# Patient Record
Sex: MALE | Race: WHITE | NOT HISPANIC OR LATINO | Employment: OTHER | URBAN - METROPOLITAN AREA
[De-identification: names, ages, dates, MRNs, and addresses within clinical notes are randomized per-mention and may not be internally consistent; named-entity substitution may affect disease eponyms.]

---

## 2017-01-17 ENCOUNTER — TELEPHONE (OUTPATIENT)
Dept: CARDIOLOGY | Facility: CLINIC | Age: 72
End: 2017-01-17

## 2017-01-17 DIAGNOSIS — E78.2 MIXED HYPERLIPIDEMIA: Primary | ICD-10-CM

## 2017-01-17 DIAGNOSIS — I10 ESSENTIAL HYPERTENSION, BENIGN: Primary | ICD-10-CM

## 2017-01-17 RX ORDER — HYDROCHLOROTHIAZIDE 25 MG/1
25 TABLET ORAL DAILY
Qty: 90 TABLET | Refills: 1 | Status: SHIPPED | OUTPATIENT
Start: 2017-01-17 | End: 2017-01-31

## 2017-01-17 NOTE — TELEPHONE ENCOUNTER
Patient called in to request a change in statins-from atorvastatin 80 MG to Crestor 20 MG due to change insurance which will now be [employer]. He was switched from Crestor to Atorvastatin last May by Dr. Mata due to costs. Patient states that he has been experiencing more myalgias since atorvastatin was increased to 80 MG last September. I told him that I will have Dr. Mata review and then get back to him. [facility]      9/12/16  8:56 AM [id number]        Component Results      Component Value Ref Range & Units Status     Cholesterol 148 <200 mg/dL Final     Triglycerides 146 <150 mg/dL Final     HDL Cholesterol 42 >39 mg/dL Final     LDL Cholesterol Calculated 77 <100 mg/dL Final     Comment:      Desirable:       <100 mg/dl     Non HDL Cholesterol 106 <130 mg/dL Final                 IMPRESSION AND PLAN:  A very delightful 71-year-old gentleman with chronic stable angina with moderate nonobstructive disease involving the RCA on coronary angiogram with other comorbidities of hypertension, with history of intolerance to beta blocker.  Cardiovascular status wise, he is doing well.  His chronic stable angina is CCS class 1-2, stable in nature.  At his request, I am changing Crestor to Lipitor.  We will check a lipid panel in 3 months' time.  He should continue aspirin.  He did not tolerate beta blocker in the past.  If his symptoms get worse, we can try an alternative medication like a calcium channel blocker or Ranexa.  To be noted, he also did not tolerate long-acting nitrates.  At this time, I am planning to see him back in 1 year's time.  We will also do an echocardiogram at that time to follow up on the aortic stenosis.        1.  CAD with chronic stable angina, CCS class 1-2.  Moderate nonobstructive coronary artery disease seen in the right coronary artery system on coronary angiogram.    2.  Hypertension, well controlled.    3.  Mild aortic stenosis.    4.  Suspected restless leg syndrome.        RECOMMENDATIONS:     1.  Change Crestor to Lipitor at patient's request due to cost issues.    2.  Continue aspirin.    3.  Follow up with primary care physician regarding possibility of restless leg syndrome.    4.  Follow up in Cardiology Clinic in 1 year's time with an echocardiogram.

## 2017-01-18 RX ORDER — ROSUVASTATIN CALCIUM 20 MG/1
20 TABLET, COATED ORAL DAILY
Qty: 90 TABLET | Refills: 1 | Status: SHIPPED | OUTPATIENT
Start: 2017-01-18 | End: 2017-03-03

## 2017-01-18 NOTE — TELEPHONE ENCOUNTER
Received refill request for:  Crestor, per phone encounter on 2017  Last OV was: 2016 with Dr. Mata  Labs/EK2016 lipids  F/U scheduled: orders in Epic for 2017, entered order for lipids in 2 months  New script sent to: [facility]

## 2017-01-31 DIAGNOSIS — I10 ESSENTIAL HYPERTENSION, BENIGN: Primary | ICD-10-CM

## 2017-01-31 RX ORDER — HYDROCHLOROTHIAZIDE 25 MG/1
25 TABLET ORAL DAILY
Qty: 90 TABLET | Refills: 0 | Status: SHIPPED | OUTPATIENT
Start: 2017-01-31 | End: 2017-07-31

## 2017-03-02 DIAGNOSIS — E78.2 MIXED HYPERLIPIDEMIA: ICD-10-CM

## 2017-03-02 LAB
CHOLEST SERPL-MCNC: 153 MG/DL
HDLC SERPL-MCNC: 50 MG/DL
LDLC SERPL CALC-MCNC: 76 MG/DL
NONHDLC SERPL-MCNC: 103 MG/DL
TRIGL SERPL-MCNC: 134 MG/DL

## 2017-03-02 PROCEDURE — 36415 COLL VENOUS BLD VENIPUNCTURE: CPT | Performed by: INTERNAL MEDICINE

## 2017-03-02 PROCEDURE — 80061 LIPID PANEL: CPT | Performed by: INTERNAL MEDICINE

## 2017-03-03 ENCOUNTER — DOCUMENTATION ONLY (OUTPATIENT)
Dept: CARDIOLOGY | Facility: CLINIC | Age: 72
End: 2017-03-03

## 2017-03-03 DIAGNOSIS — E78.00 HYPERCHOLESTEREMIA: Primary | ICD-10-CM

## 2017-03-03 DIAGNOSIS — E78.2 MIXED HYPERLIPIDEMIA: ICD-10-CM

## 2017-03-03 RX ORDER — ROSUVASTATIN CALCIUM 20 MG/1
40 TABLET, COATED ORAL DAILY
Qty: 90 TABLET | Refills: 3 | Status: SHIPPED | OUTPATIENT
Start: 2017-03-03 | End: 2017-03-15

## 2017-03-03 NOTE — PROGRESS NOTES
RN called patient with Dr. Mata recommendations below to increase crestor to 40mg daily and have lipid panel with ALT repeated in 2-3 months. RN placed orders for labs and sent new rx to [facility] for 40mg crestor daily. Patient advised to call clinic with any further questions or concerns. Patient acknowledged understanding and agreed with plan.         Patient's last lipid panel was 9/12/16. Per your request, patient had lipid drawn and new results are below. Of note, patient switched from 80mg of atorvastatin to 20mg of Crestor per patient request on 1/17/17(insurance coverage change).         Will send to Dr. Mata to review advise

## 2017-03-03 NOTE — PROGRESS NOTES
Fairly controlled LDL, I recommend  increase crestor to 40 mg qd, this will likely bring LDL below 70. Can repeat lipid panel in 2-3 months with an ALT.    Thanks  Bharat

## 2017-03-15 DIAGNOSIS — E78.2 MIXED HYPERLIPIDEMIA: ICD-10-CM

## 2017-03-15 RX ORDER — ROSUVASTATIN CALCIUM 40 MG/1
40 TABLET, COATED ORAL DAILY
Qty: 90 TABLET | Refills: 3 | Status: SHIPPED | OUTPATIENT
Start: 2017-03-15 | End: 2018-02-06

## 2017-04-11 ENCOUNTER — OFFICE VISIT (OUTPATIENT)
Dept: FAMILY MEDICINE | Facility: CLINIC | Age: 72
End: 2017-04-11

## 2017-04-11 VITALS
HEART RATE: 68 BPM | DIASTOLIC BLOOD PRESSURE: 68 MMHG | RESPIRATION RATE: 20 BRPM | HEIGHT: 71 IN | SYSTOLIC BLOOD PRESSURE: 136 MMHG | BODY MASS INDEX: 27.47 KG/M2 | OXYGEN SATURATION: 98 % | WEIGHT: 196.2 LBS | TEMPERATURE: 97.6 F

## 2017-04-11 DIAGNOSIS — J32.0 CHRONIC MAXILLARY SINUSITIS: ICD-10-CM

## 2017-04-11 DIAGNOSIS — R05.3 CHRONIC COUGH: Primary | ICD-10-CM

## 2017-04-11 PROCEDURE — 99213 OFFICE O/P EST LOW 20 MIN: CPT | Performed by: PHYSICIAN ASSISTANT

## 2017-04-11 RX ORDER — DOXYCYCLINE 100 MG/1
100 CAPSULE ORAL 2 TIMES DAILY
Qty: 20 CAPSULE | Refills: 0 | Status: SHIPPED | OUTPATIENT
Start: 2017-04-11 | End: 2017-05-02

## 2017-04-11 NOTE — MR AVS SNAPSHOT
After Visit Summary   4/11/2017    Nilesh Dos Santos    MRN: [id number]           Patient Information     Date Of Birth          1945        Visit Information        Provider Department      4/11/2017 1:15 PM Ashley Haider PA [facility]        Today's Diagnoses     Chronic cough    -  1    Chronic maxillary sinusitis           Follow-ups after your visit        Your next 10 appointments already scheduled     Apr 28, 2017  9:30 AM CDT   Ech Complete with [id number] ([facility])    [address]  [facility] [id number] [phone]           1.  Please bring or wear a comfortable two-piece outfit. 2.  You may eat, drink and take your normal medicines. 3.  For any questions that cannot be answered, please contact the ordering physician ***Please check-in at the [facility] located in [address] in the [facility] building. When you are finished registering, please go to [address] and have a seat. The technician will call your name for the test.            May 02, 2017  8:45 AM CDT   Return Visit with Bharat Mata MD   [facility] [place] ([facility])    [address]  [facility] [id number] [phone]            May 09, 2017  9:30 AM CDT   Physical-Complete with Nilesh Guzman MD   [facility] ([facility])    [address]  [address]  [facility] [address] [phone]           Instruct patient that they should have nothing(except water) after midnight the evening prior to the appointment.              Who to contact     If you have questions or need follow up information about today's clinic visit or your schedule please contact [facility] directly at [phone].  Normal or non-critical lab and imaging results  "will be communicated to you by iFithart, letter or phone within 4 business days after the clinic has received the results. If you do not hear from us within 7 days, please contact the clinic through Dnevnik or phone. If you have a critical or abnormal lab result, we will notify you by phone as soon as possible.  Submit refill requests through Dnevnik or call your pharmacy and they will forward the refill request to us. Please allow 3 business days for your refill to be completed.          Additional Information About Your Visit        Dnevnik Information     Dnevnik gives you secure access to your electronic health record. If you see a primary care provider, you can also send messages to your care team and make appointments. If you have questions, please call your primary care clinic.  If you do not have a primary care provider, please call [phone] and they will assist you.        Care EveryWhere ID     This is your Care EveryWhere ID. This could be used by other organizations to access your [facility] medical records  [id number]        Your Vitals Were     Pulse Temperature Respirations Height Pulse Oximetry BMI (Body Mass Index)    68 97.6  F (36.4  C) (Oral) 20 1.791 m (5' 10.5\") 98% 27.75 kg/m2       Blood Pressure from Last 3 Encounters:   04/11/17 136/68   05/27/16 124/76   11/24/15 118/64    Weight from Last 3 Encounters:   04/11/17 89 kg (196 lb 3.2 oz)   05/27/16 90.4 kg (199 lb 3.2 oz)   11/24/15 87.1 kg (192 lb)              Today, you had the following     No orders found for display         Today's Medication Changes          These changes are accurate as of: 4/11/17 11:59 PM.  If you have any questions, ask your nurse or doctor.               Start taking these medicines.        Dose/Directions    doxycycline 100 MG capsule   Commonly known as:  VIBRAMYCIN   Used for:  Chronic cough, Chronic maxillary sinusitis   Started by:  Ashley Haider PA        Dose:  100 mg   Take 1 capsule (100 " mg) by mouth 2 times daily   Quantity:  20 capsule   Refills:  0            Where to get your medicines      These medications were sent to [facility] - [place] [id number]  [address] [address]     Phone:  [phone]     doxycycline 100 MG capsule                Primary Care Provider Office Phone # Fax #    Nilesh Guzman [phone]       [facility] [address] [facility] [address]  [facility] [address]        Thank you!     Thank you for choosing [facility]  for your care. Our goal is always to provide you with excellent care. Hearing back from our patients is one way we can continue to improve our services. Please take a few minutes to complete the written survey that you may receive in the mail after your visit with us. Thank you!             Your Updated Medication List - Protect others around you: Learn how to safely use, store and throw away your medicines at www.disposemymeds.org.          This list is accurate as of: 4/11/17 11:59 PM.  Always use your most recent med list.                   Brand Name Dispense Instructions for use    ALEVE 220 MG tablet   Generic drug:  naproxen sodium      Take 220 mg by mouth as needed for moderate pain       aspirin 81 MG tablet      Take by mouth daily       coenzyme Q-10 capsule     90 capsule    Take 1 capsule by mouth daily       COMPRESSION STOCKINGS     2 each    1 each daily       doxycycline 100 MG capsule    VIBRAMYCIN    20 capsule    Take 1 capsule (100 mg) by mouth 2 times daily       hydrochlorothiazide 25 MG tablet    HYDRODIURIL    90 tablet    Take 1 tablet (25 mg) by mouth daily       NITROSTAT 0.4 MG sublingual tablet   Generic drug:  nitroglycerin      Place under the tongue every 5 minutes as needed       rosuvastatin 40 MG tablet    CRESTOR    90 tablet    Take 1 tablet (40 mg) by mouth daily

## 2017-04-11 NOTE — NURSING NOTE
Nilesh Dos Santos is here for a cough for about 2 months. Sates that he get this every year, but this it the longest this has lasted.  Questioned patient about current smoking habits.  Pt. quit smoking some time ago.  Body mass index is 25.89 kg/(m^2).  PULSE regular  My Chart: active  CLASSIFICATION OF OVERWEIGHT AND OBESITY BY BMI                        Obesity Class           BMI(kg/m2)  Underweight                                    < 18.5  Normal                                         18.5-24.9  Overweight                                     25.0-29.9  OBESITY                     I                  30.0-34.9                             II                 35.0-39.9  EXTREME OBESITY             III                >40                            Patient's  BMI Body mass index is 27.75 kg/(m^2).  http://hin.nhlbi.nih.gov/menuplanner/menu.cgi    Pre-visit planning  Immunizations - up to date  Colonoscopy - is up to date  Mammogram -   Asthma -   PHQ9 -    BARTOLO-7 -

## 2017-04-11 NOTE — PROGRESS NOTES
SUBJECTIVE:                                                    Nilesh Dos Santos is a 72 year old male who presents to clinic today for the following health issues:    Nilesh  is here today because of: Cough    Patient states he has a chronic (since college) night-time plugging of maxillary sinuses - When he rolls to one side - fluid shift to the other side. He had a nasal surgery at [age] due to fracture. Sx started after that.    He states about once a year for the past few years he has had a cough in the winter that lasts about a month.  Sx usually in Jan/Feb.    This time his cough has continued, 2 months, occ. Productive.  Has started Benadryl 25 mg tid which helps.  Has occ, SOB, In evening SOB is worse, no fevers.  No hx of asthma.     Patient is not a smoker    Other x:   Pt keeps biting right cheek - seeing dentist  Ears feel plugged - 2 weeks.  Runny nose in the am  No facial pain        BP Readings from Last 3 Encounters:   04/11/17 136/68   05/27/16 124/76   11/24/15 118/64    Wt Readings from Last 3 Encounters:   04/11/17 89 kg (196 lb 3.2 oz)   05/27/16 90.4 kg (199 lb 3.2 oz)   11/24/15 87.1 kg (192 lb)                  Patient Active Problem List   Diagnosis     Calculus of kidney     Allergic rhinitis     Mixed hyperlipidemia     Family history of other cardiovascular diseases     History of colonic polyps     Contact dermatitis and other eczema, due to unspecified cause     Essential hypertension, benign     Health Care Home     Advance care planning     DJD (degenerative joint disease) of knee     Abnormal glucose     Sprain of thoracic region     Cardiovascular disease     Chest pain     SOB (shortness of breath)     HTN (hypertension)     Varicose veins     CAD (coronary artery disease)     Past Surgical History:   Procedure Laterality Date     ANGIOGRAM  11/17/2015    Med Tx, no flow limiting lesions     C URETER ENDOSCOPY THRU URETEROSTOMY REMV FB OR CALCULUS  2001    dr de la garza     EYE EXAM  "ESTABLISHED PT  2009     HC COLONOSCOPY THRU STOMA, DIAGNOSTIC       HC EXCISE VARICOCELE      \"cautery\" through intra venous approach     HC KNEE SCOPE, DIAGNOSTIC      Arthroscopy, Knee/left knee      HC REPAIR ING HERNIA,5+Y/O,REDUCIBL      Inguinal Hernia Repair  Right     HC VASECTOMY UNILAT/BILAT W POSTOP SEMEN      Vasectomy     TEST NOT FOUND      Per cut removal of L kidney stone       Social History   Substance Use Topics     Smoking status: Former Smoker     Quit date: 1979     Smokeless tobacco: Never Used      Comment: quit a while ago     Alcohol use 2.0 oz/week     4 Glasses of wine per week      Comment: 1-2 glass of wine daily     Family History   Problem Relation Age of Onset     Alzheimer Disease No family hx of      CANCER No family hx of      HEART DISEASE Mother      91     HEART DISEASE Father       70s     DIABETES Maternal Aunt      Cancer - colorectal Mother      Prostate Cancer No family hx of          Current Outpatient Prescriptions   Medication Sig Dispense Refill     rosuvastatin (CRESTOR) 40 MG tablet Take 1 tablet (40 mg) by mouth daily 90 tablet 3     hydrochlorothiazide (HYDRODIURIL) 25 MG tablet Take 1 tablet (25 mg) by mouth daily 90 tablet 0     COMPRESSION STOCKINGS 1 each daily 2 each 4     naproxen sodium (ALEVE) 220 MG tablet Take 220 mg by mouth as needed for moderate pain       coenzyme Q-10 capsule Take 1 capsule by mouth daily 90 capsule 3     aspirin 81 MG tablet Take by mouth daily       nitroglycerin (NITROSTAT) 0.4 MG SL tablet Place under the tongue every 5 minutes as needed       Allergies   Allergen Reactions     Amoxicillin      severe rash     Contrast Dye Hives     Patient states this was years ago and he believes he has had dye since that time without problems.       OBJECTIVE:                                                    /68 (BP Location: Left arm, Patient Position: Chair, Cuff Size: Adult Large)  Pulse 68  Temp 97.6  F " "(36.4  C) (Oral)  Resp 20  Ht 1.791 m (5' 10.5\")  Wt 89 kg (196 lb 3.2 oz)  SpO2 98%  BMI 27.75 kg/m2   Body mass index is 27.75 kg/(m^2).     GENERAL: healthy, alert and no distress  EYES:Lids and Conjunctival normal, no discharge present bilaterally  EARS:   Right: CANAL and TM is normal: no effusions, no erythema, and normal landmarks  Left: CANAL and TM is normal: no effusions, no erythema, and normal landmarks  NOSE: normal  OROPHARYNX: normal, no tonsillar hypertrophy and no exudates present  NECK: normal, supple and no adenopathy  HEART: regular rate and rhythm; no murmurs, clicks, or gallops  LUNGS: CTA bilaterally  SKIN:Warm, Dry and No Rash           ASSESSMENT/PLAN:                                                          P:       ICD-10-CM    1. Chronic cough R05 doxycycline (VIBRAMYCIN) 100 MG capsule   2. Chronic maxillary sinusitis J32.0 doxycycline (VIBRAMYCIN) 100 MG capsule       Trial of doxycycline for suspected chronic Sinusitis.  Pt to call next week with update.  Advised CT of sinuses if sinus sx not improving.    Pt to start OTC Allegra D in a few days.    If sx not improving next week also add Singulair      If an antibiotic was prescribed, AE were discussed including GI upset. Advised either yogurt or probiotic at lunchtime daily.     Pt will call next week with update.  Advised Chest xray in 2-3 weeks if not improving, sooner if worsening.       Ashley Haider PA-C  4/11/2017          "

## 2017-04-28 ENCOUNTER — HOSPITAL ENCOUNTER (OUTPATIENT)
Dept: CARDIOLOGY | Facility: CLINIC | Age: 72
Discharge: HOME | End: 2017-04-28
Attending: INTERNAL MEDICINE | Admitting: INTERNAL MEDICINE
Payer: MEDICARE

## 2017-04-28 DIAGNOSIS — I35.9 AORTIC VALVE DISORDER: ICD-10-CM

## 2017-04-28 DIAGNOSIS — I25.10 CORONARY ARTERY DISEASE INVOLVING NATIVE HEART, ANGINA PRESENCE UNSPECIFIED, UNSPECIFIED VESSEL OR LESION TYPE: ICD-10-CM

## 2017-04-28 PROCEDURE — 93306 TTE W/DOPPLER COMPLETE: CPT

## 2017-04-28 PROCEDURE — 93306 TTE W/DOPPLER COMPLETE: CPT | Mod: 26 | Performed by: INTERNAL MEDICINE

## 2017-05-02 ENCOUNTER — OFFICE VISIT (OUTPATIENT)
Dept: CARDIOLOGY | Facility: CLINIC | Age: 72
End: 2017-05-02
Attending: INTERNAL MEDICINE
Payer: COMMERCIAL

## 2017-05-02 VITALS
SYSTOLIC BLOOD PRESSURE: 118 MMHG | HEIGHT: 71 IN | OXYGEN SATURATION: 99 % | WEIGHT: 196 LBS | HEART RATE: 69 BPM | BODY MASS INDEX: 27.44 KG/M2 | DIASTOLIC BLOOD PRESSURE: 78 MMHG

## 2017-05-02 DIAGNOSIS — I25.10 CORONARY ARTERY DISEASE INVOLVING NATIVE HEART, ANGINA PRESENCE UNSPECIFIED, UNSPECIFIED VESSEL OR LESION TYPE: ICD-10-CM

## 2017-05-02 DIAGNOSIS — I35.9 AORTIC VALVE DISORDER: ICD-10-CM

## 2017-05-02 PROCEDURE — 99213 OFFICE O/P EST LOW 20 MIN: CPT | Performed by: INTERNAL MEDICINE

## 2017-05-02 NOTE — PROGRESS NOTES
HPI and Plan:   See dictation(#[id number])    Orders Placed This Encounter   Procedures     Lipid Profile     ALT     Follow-Up with Cardiologist       No orders of the defined types were placed in this encounter.      Medications Discontinued During This Encounter   Medication Reason     doxycycline (VIBRAMYCIN) 100 MG capsule Therapy completed         Encounter Diagnoses   Name Primary?     Coronary artery disease involving native heart, angina presence unspecified, unspecified vessel or lesion type      Aortic valve disorder        CURRENT MEDICATIONS:  Current Outpatient Prescriptions   Medication Sig Dispense Refill     rosuvastatin (CRESTOR) 40 MG tablet Take 1 tablet (40 mg) by mouth daily 90 tablet 3     hydrochlorothiazide (HYDRODIURIL) 25 MG tablet Take 1 tablet (25 mg) by mouth daily 90 tablet 0     COMPRESSION STOCKINGS 1 each daily 2 each 4     naproxen sodium (ALEVE) 220 MG tablet Take 220 mg by mouth as needed for moderate pain       coenzyme Q-10 capsule Take 1 capsule by mouth daily 90 capsule 3     aspirin 81 MG tablet Take by mouth daily       nitroglycerin (NITROSTAT) 0.4 MG SL tablet Place under the tongue every 5 minutes as needed         ALLERGIES     Allergies   Allergen Reactions     Amoxicillin      severe rash     Contrast Dye Hives     Patient states this was years ago and he believes he has had dye since that time without problems.       PAST MEDICAL HISTORY:  Past Medical History:   Diagnosis Date     CAD (coronary artery disease)     diffuse CAD      Chest pain     chronic stable     Chronic stable angina (H)      Contact dermatitis and other eczema, due to unspecified cause      DEPRESSIVE DISORDER NEC      Dyslipidemia      Family history of other cardiovascular diseases     Mother and Father     HTN (hypertension)      IRRITABLE COLON      Mild aortic stenosis      Personal history of urinary calculi      Skin cancer, basal cell 2008     SOB (shortness of breath)      Varicose veins   "      PAST SURGICAL HISTORY:  Past Surgical History:   Procedure Laterality Date     ANGIOGRAM  2015    Med Tx, no flow limiting lesions     C URETER ENDOSCOPY THRU URETEROSTOMY REMV FB OR CALCULUS      dr de la garza     EYE EXAM ESTABLISHED PT       HC COLONOSCOPY THRU STOMA, DIAGNOSTIC       HC EXCISE VARICOCELE      \"cautery\" through intra venous approach     HC KNEE SCOPE, DIAGNOSTIC      Arthroscopy, Knee/left knee      HC REPAIR ING HERNIA,5+Y/O,REDUCIBL      Inguinal Hernia Repair  Right     HC VASECTOMY UNILAT/BILAT W POSTOP SEMEN      Vasectomy     TEST NOT FOUND      Per cut removal of L kidney stone       FAMILY HISTORY:  Family History   Problem Relation Age of Onset     HEART DISEASE Mother      91     Cancer - colorectal Mother      HEART DISEASE Father       70s     DIABETES Maternal Aunt      Alzheimer Disease No family hx of      CANCER No family hx of      Prostate Cancer No family hx of        SOCIAL HISTORY:  Social History     Social History     Marital status:      Spouse name: Velia     Number of children: 2     Years of education: 18     Occupational History     Consultant Self     Social History Main Topics     Smoking status: Former Smoker     Types: Cigars     Quit date: 1975     Smokeless tobacco: Never Used      Comment: 2-3 cigars     Alcohol use 2.0 oz/week     4 Glasses of wine per week      Comment: 1-2 glass of wine daily     Drug use: No     Sexual activity: Yes     Partners: Female     Birth control/ protection: Surgical      Comment: vas     Other Topics Concern      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, , [employer]      Blood Transfusions No     Caffeine Concern Yes     2 cups per day     Occupational Exposure No     Hobby Hazards No     Sleep Concern No     Stress Concern No     Weight Concern No     Special Diet No     Exercise No     Seat Belt Yes     Self-Exams No     Social History Narrative       Review of Systems:  Skin:  " "Negative       Eyes:  Positive for glasses dry & scratchy   ENT:  Positive for sinus trouble;postnasal drainage;nasal congestion    Respiratory:  Positive for cough;wheezing     Cardiovascular:    fatigue;Positive for    Gastroenterology: Negative      Genitourinary:  Negative      Musculoskeletal:  Positive for arthritis    Neurologic:  Positive for numbness or tingling of feet numbness in toes   Psychiatric:  Positive for depression    Heme/Lymph/Imm:  Positive for easy bruising    Endocrine:  Negative        Physical Exam:  Vitals: /78 (BP Location: Right arm, Patient Position: Chair, Cuff Size: Adult Regular)  Pulse 69  Ht 1.791 m (5' 10.5\")  Wt 88.9 kg (196 lb)  SpO2 99%  BMI 27.73 kg/m2    Constitutional:           Skin:           Head:           Eyes:           ENT:           Neck:           Chest:             Cardiac:                    Abdomen:           Vascular:                                          Extremities and Back:                 Neurological:                 ABUNDIO Mata MD   [facility]  [address]  [place] [address]                  "

## 2017-05-02 NOTE — MR AVS SNAPSHOT
After Visit Summary   5/2/2017    Nilesh Dos Santos    MRN: [id number]           Patient Information     Date Of Birth          1945        Visit Information        Provider Department      5/2/2017 8:45 AM Bharat Mata MD [facility]        Today's Diagnoses     Coronary artery disease involving native heart, angina presence unspecified, unspecified vessel or lesion type        Aortic valve disorder           Follow-ups after your visit        Additional Services     Follow-Up with Cardiologist                 Your next 10 appointments already scheduled     May 09, 2017  9:30 AM CDT   Physical-Complete with Nilesh Guzman MD   [facility] ([facility])    [address]  [address]  [facility] [address] [phone]           Instruct patient that they should have nothing(except water) after midnight the evening prior to the appointment.              Future tests that were ordered for you today     Open Future Orders        Priority Expected Expires Ordered    Lipid Profile Routine 5/2/2018 6/6/2018 5/2/2017    ALT Routine 5/2/2018 6/6/2018 5/2/2017    Follow-Up with Cardiologist Routine 5/2/2018 9/14/2018 5/2/2017            Who to contact     If you have questions or need follow up information about today's clinic visit or your schedule please contact [facility] directly at [phone].  Normal or non-critical lab and imaging results will be communicated to you by MyChart, letter or phone within 4 business days after the clinic has received the results. If you do not hear from us within 7 days, please contact the clinic through MyChart or phone. If you have a critical or abnormal lab result, we will notify you by phone as soon as possible.  Submit refill requests through [employer] or call your pharmacy and they will forward the refill request to us.  "Please allow 3 business days for your refill to be completed.          Additional Information About Your Visit        MyChart Information     [employer] gives you secure access to your electronic health record. If you see a primary care provider, you can also send messages to your care team and make appointments. If you have questions, please call your primary care clinic.  If you do not have a primary care provider, please call [phone] and they will assist you.        Care EveryWhere ID     This is your Care EveryWhere ID. This could be used by other organizations to access your [place] medical records  [id number]        Your Vitals Were     Pulse Height Pulse Oximetry BMI (Body Mass Index)          69 1.791 m (5' 10.5\") 99% 27.73 kg/m2         Blood Pressure from Last 3 Encounters:   05/02/17 118/78   04/11/17 136/68   05/27/16 124/76    Weight from Last 3 Encounters:   05/02/17 88.9 kg (196 lb)   04/11/17 89 kg (196 lb 3.2 oz)   05/27/16 90.4 kg (199 lb 3.2 oz)              We Performed the Following     Follow-Up with Cardiologist        Primary Care Provider Office Phone # Fax #    Nilesh Guzman [phone]       [facility] [address]  [facility] [address]        Thank you!     Thank you for choosing [facility] [place]  for your care. Our goal is always to provide you with excellent care. Hearing back from our patients is one way we can continue to improve our services. Please take a few minutes to complete the written survey that you may receive in the mail after your visit with us. Thank you!             Your Updated Medication List - Protect others around you: Learn how to safely use, store and throw away your medicines at www.disposemymeds.org.          This list is accurate as of: 5/2/17  9:21 AM.  Always use your most recent med list.                   Brand Name Dispense Instructions for use    ALEVE 220 " MG tablet   Generic drug:  naproxen sodium      Take 220 mg by mouth as needed for moderate pain       aspirin 81 MG tablet      Take by mouth daily       coenzyme Q-10 capsule     90 capsule    Take 1 capsule by mouth daily       COMPRESSION STOCKINGS     2 each    1 each daily       hydrochlorothiazide 25 MG tablet    HYDRODIURIL    90 tablet    Take 1 tablet (25 mg) by mouth daily       NITROSTAT 0.4 MG sublingual tablet   Generic drug:  nitroglycerin      Place under the tongue every 5 minutes as needed       rosuvastatin 40 MG tablet    CRESTOR    90 tablet    Take 1 tablet (40 mg) by mouth daily

## 2017-05-02 NOTE — PROGRESS NOTES
REASON FOR CLINIC VISIT:  Follow up CAD.      HISTORY OF PRESENT ILLNESS:  Mr. Dos Santos is a very pleasant 72-year-old gentleman with history of chronic stable angina, CAD with coronary CT angiogram in 2013 showing diffuse coronary artery disease, but no significant obstructive disease and due to exertional chest discomfort he eventually underwent coronary angiogram in 2015 where he was found to have moderate stenosis involving the right posterior descending and posterolateral branch with FFR of 0.9 and 0.94, respectively, indicating they were not flow-limiting lesions.  Today, the patient is coming for routine followup.  He has been on Imdur and beta blocker in the past, but they were discontinued due to side effects.  The patient tells me that cardiac health wise he feels quite well.  He did shovel snow this season and has no chest discomfort with physical activity.  He is struggling with fatigue which he blames on poor sleep.  Due to insurance issues, Crestor was changed to Lipitor, but Lipitor caused him side effects and he was switched back to Crestor.  Lipid panel done in March shows LDL of 76.  In the past, his LDL has been as low as 52.  This prompted increasing Crestor from 20 to 40 mg daily and he is due for repeat lipid panel check in a few weeks' time.  He also had an echocardiogram done that shows normal LVEF of 60-65%.  There was mild atherosclerotic plaque seen in the ascending aorta, borderline to mildly dilated ascending aorta measuring 3.8 cm.      PHYSICAL EXAMINATION:   VITAL SIGNS:  Blood pressure 118/78, heart rate 69 and regular.  Weight 196 pounds, BMI 27.78.   GENERAL:  The patient appears pleasant, comfortable.   NECK:  Normal JVP, no bruit.   CARDIOVASCULAR SYSTEM:  There is systolic ejection click heard over the right upper sternal border.  No murmur, rub or gallop.   RESPIRATORY SYSTEM:  Clear to auscultation bilaterally.   ABDOMEN:  Soft, nontender.   EXTREMITIES:  No pitting pedal edema.    NEUROLOGICAL:  Alert, oriented x3.   PSYCHIATRIC:  Normal affect.   SKIN:  No obvious rash.   HEENT:  No pallor or icterus.   VASCULAR:  2+ dorsalis pedis and posterior tibialis bilaterally, lower extremities.      IMPRESSION AND PLAN:  A very delightful 72-year-old gentleman with history of chronic stable angina with coronary angiogram in  showing moderate stenosis involving the RPDA and RPLA with normal LV function.  Overall, cardiovascular status-wise he is doing well.  He no longer is feeling chest discomfort with exertion.  Blood pressure is well controlled.  He is on hydrochlorothiazide.  He is additionally on baby aspirin and Crestor 40 mg daily.  He is due for repeat lipid panel.  Regarding borderline dilated aorta we can repeat echocardiogram in 2 years time or so.   If he continues to feel well cardiac wise, we can see him back in 1 year's time. In the interim, if he notices any exertional increase in symptoms of chest discomfort, he should call our clinic and we will see him sooner.         SHAUNA BLOCK MD             D: 2017 09:30   T: 2017 12:24   MT: SONDRA      Name:     MIRA MACHADO   MRN:      [id number]        Account:      [id number]   :      1945           Service Date: 2017      Document: [id number]

## 2017-05-02 NOTE — LETTER
5/2/2017    Nilesh Guzman MD  [facility]   [address] [facility] [address]  [facility] [address]    RE: Nilesh GABRIELE Dos Santos       Dear Colleague,    I had the pleasure of seeing Nilesh Dos Santos in the [facility].    REASON FOR CLINIC VISIT:  Follow up CAD.      Mr. Dos Santos is a very pleasant 72-year-old gentleman with history of chronic stable angina, CAD with coronary CT angiogram in 2013 showing diffuse coronary artery disease, but no significant obstructive disease and due to exertional chest discomfort he eventually underwent coronary angiogram in 2015 where he was found to have moderate stenosis involving the right posterior descending and posterolateral branch with FFR of 0.9 and 0.94, respectively, indicating they were not flow-limiting lesions.  Today, the patient is coming for routine followup.  He has been on Imdur and beta blocker in the past, but they were discontinued due to side effects.  The patient tells me that cardiac health wise he feels quite well.  He did shovel snow this season and has no chest discomfort with physical activity.  He is struggling with fatigue which he blames on poor sleep.  Due to insurance issues, Crestor was changed to Lipitor, but Lipitor caused him side effects and he was switched back to Crestor.  Lipid panel done in March shows LDL of 76.  In the past, his LDL has been as low as 52.  This prompted increasing Crestor from 20 to 40 mg daily and he is due for repeat lipid panel check in a few weeks' time.  He also had an echocardiogram done that shows normal LVEF of 60-65%.  There was mild atherosclerotic plaque seen in the ascending aorta, borderline to mildly dilated ascending aorta measuring 3.8 cm.      PHYSICAL EXAMINATION:   VITAL SIGNS:  Blood pressure 118/78, heart rate 69 and regular.  Weight 196 pounds, BMI 27.78.   GENERAL:  The patient appears pleasant, comfortable.   NECK:  Normal JVP, no bruit.   CARDIOVASCULAR  SYSTEM:  There is systolic ejection click heard over the right upper sternal border.  No murmur, rub or gallop.   RESPIRATORY SYSTEM:  Clear to auscultation bilaterally.   ABDOMEN:  Soft, nontender.   EXTREMITIES:  No pitting pedal edema.   NEUROLOGICAL:  Alert, oriented x3.   PSYCHIATRIC:  Normal affect.   SKIN:  No obvious rash.   HEENT:  No pallor or icterus.   VASCULAR:  2+ dorsalis pedis and posterior tibialis bilaterally, lower extremities.     Outpatient Encounter Prescriptions as of 5/2/2017   Medication Sig Dispense Refill     rosuvastatin (CRESTOR) 40 MG tablet Take 1 tablet (40 mg) by mouth daily 90 tablet 3     hydrochlorothiazide (HYDRODIURIL) 25 MG tablet Take 1 tablet (25 mg) by mouth daily 90 tablet 0     COMPRESSION STOCKINGS 1 each daily 2 each 4     naproxen sodium (ALEVE) 220 MG tablet Take 220 mg by mouth as needed for moderate pain       coenzyme Q-10 capsule Take 1 capsule by mouth daily 90 capsule 3     aspirin 81 MG tablet Take by mouth daily       nitroglycerin (NITROSTAT) 0.4 MG SL tablet Place under the tongue every 5 minutes as needed       [DISCONTINUED] doxycycline (VIBRAMYCIN) 100 MG capsule Take 1 capsule (100 mg) by mouth 2 times daily 20 capsule 0     No facility-administered encounter medications on file as of 5/2/2017.       IMPRESSION AND PLAN:  A very delightful 72-year-old gentleman with history of chronic stable angina with coronary angiogram in 2015 showing moderate stenosis involving the RPDA and RPLA with normal LV function.  Overall, cardiovascular status-wise he is doing well.  He no longer is feeling chest discomfort with exertion.  Blood pressure is well controlled.  He is on hydrochlorothiazide.  He is additionally on baby aspirin and Crestor 40 mg daily.  He is due for repeat lipid panel.  Regarding borderline dilated aorta we can repeat echocardiogram in 2 years time or so.   If he continues to feel well cardiac wise, we can see him back in 1 year's time. In the  interim, if he notices any exertional increase in symptoms of chest discomfort, he should call our clinic and we will see him sooner.     Again, thank you for allowing me to participate in the care of your patient.      Sincerely,    Bharat Mata MD     [facility]

## 2017-05-09 ENCOUNTER — OFFICE VISIT (OUTPATIENT)
Dept: FAMILY MEDICINE | Facility: CLINIC | Age: 72
End: 2017-05-09

## 2017-05-09 VITALS
SYSTOLIC BLOOD PRESSURE: 128 MMHG | WEIGHT: 193.4 LBS | OXYGEN SATURATION: 98 % | TEMPERATURE: 98.1 F | BODY MASS INDEX: 27.07 KG/M2 | HEART RATE: 65 BPM | HEIGHT: 71 IN | DIASTOLIC BLOOD PRESSURE: 74 MMHG

## 2017-05-09 DIAGNOSIS — R73.09 ABNORMAL GLUCOSE: ICD-10-CM

## 2017-05-09 DIAGNOSIS — I25.10 CORONARY ARTERY DISEASE INVOLVING NATIVE CORONARY ARTERY OF NATIVE HEART WITHOUT ANGINA PECTORIS: ICD-10-CM

## 2017-05-09 DIAGNOSIS — K40.90 LEFT INGUINAL HERNIA: ICD-10-CM

## 2017-05-09 DIAGNOSIS — E78.2 MIXED HYPERLIPIDEMIA: ICD-10-CM

## 2017-05-09 DIAGNOSIS — J31.0 CHRONIC RHINITIS: ICD-10-CM

## 2017-05-09 DIAGNOSIS — Z71.89 ACP (ADVANCE CARE PLANNING): ICD-10-CM

## 2017-05-09 DIAGNOSIS — I10 ESSENTIAL HYPERTENSION: ICD-10-CM

## 2017-05-09 DIAGNOSIS — Z00.00 ROUTINE GENERAL MEDICAL EXAMINATION AT A HEALTH CARE FACILITY: Primary | ICD-10-CM

## 2017-05-09 PROCEDURE — 86803 HEPATITIS C AB TEST: CPT | Mod: 90 | Performed by: FAMILY MEDICINE

## 2017-05-09 PROCEDURE — 80053 COMPREHEN METABOLIC PANEL: CPT | Mod: 90 | Performed by: FAMILY MEDICINE

## 2017-05-09 PROCEDURE — 80061 LIPID PANEL: CPT | Mod: 90 | Performed by: FAMILY MEDICINE

## 2017-05-09 PROCEDURE — G0103 PSA SCREENING: HCPCS | Mod: 90 | Performed by: FAMILY MEDICINE

## 2017-05-09 PROCEDURE — 99397 PER PM REEVAL EST PAT 65+ YR: CPT | Performed by: FAMILY MEDICINE

## 2017-05-09 PROCEDURE — 36415 COLL VENOUS BLD VENIPUNCTURE: CPT | Performed by: FAMILY MEDICINE

## 2017-05-09 NOTE — NURSING NOTE
"Nilesh Dos Santos is here for a CPX. Fasting: Yes.    Pre-visit Planning  Immunizations -up to date  Colonoscopy -is up to date  Mammogram -NA  Asthma test --NA  PHQ2 -is completed today  BARTOLO 7 -NA  Fall Risk Assessment: Completed Today    Vitals:  BP Cuff right  Arm with large Cuff  PULSE regular  193 lbs 6.4 oz and 5' 11\"  CLASSIFICATION OF OVERWEIGHT AND OBESITY BY BMI                        Obesity Class           BMI(kg/m2)  Underweight                                    < 18.5  Normal                                         18.5-24.9  Overweight                                     25.0-29.9  OBESITY                     I                  30.0-34.9                             II                 35.0-39.9  EXTREME OBESITY             III                >40      Patient's  BMI Body mass index is 26.97 kg/(m^2).  Http://hin.nhlbi.nih.gov/menuplanner/menu.cgi  Tobacco Use:  Questioned patient about current smoking habits.  Pt. quit smoking some time ago.  ETOH screening Questions:  1-How often do you have a drink containing alcohol?                             7 times per week(s)  2-How many drinks containing alcohol do you have on a typical day when you are         Drinking?                              1 to 2   3- How often do you have 5 or more drinks on one occasion?                              Never     Have you ever:  @None of the patient's responses to the CAGE screening were positive / Negative CAGE score@    Roomed by: Charisse Henderson CMA      "

## 2017-05-09 NOTE — PROGRESS NOTES
SUBJECTIVE:     CC: Nilesh Dos Santos is an 72 year old male who presents for preventative health visit.     Healthy Habits:    Do you get at least three servings of calcium containing foods daily (dairy, green leafy vegetables, etc.)? yes    Amount of exercise or daily activities, outside of work: 2 day(s) per week    Problems taking medications regularly No    Medication side effects: No    Have you had an eye exam in the past two years? yes    Do you see a dentist twice per year? yes    Do you have sleep apnea, excessive snoring or daytime drowsiness?no        Pt taking CoQ10 which helps leg aching-was switched from lipitor to crestor due to worsened stiffness    Chronic congestion-better after doxy but not gone-has not tried nasal steroid    Today's PHQ-2 Score:   PHQ-2 ( 1999 Pfizer) 5/9/2017   Q1: Little interest or pleasure in doing things 0   Q2: Feeling down, depressed or hopeless 1   PHQ-2 Score 1       Abuse: Current or Past(Physical, Sexual or Emotional)- No  Do you feel safe in your environment - Yes    Social History   Substance Use Topics     Smoking status: Former Smoker     Types: Cigars     Quit date: 1/1/1975     Smokeless tobacco: Never Used      Comment: 2-3 cigars     Alcohol use 2.0 oz/week     4 Glasses of wine per week      Comment: 1-2 glass of wine daily     The patient does not drink >3 drinks per day nor >7 drinks per week.    Last PSA:   [facility] PSA   Date Value Ref Range Status   07/13/2011 0.3 < OR = 4.0 ng/mL Final     Comment:        This test was performed using the Siemens  chemiluminescent method. Values obtained from  different assay methods cannot be used  interchangeably. PSA levels, regardless of  value, should not be interpreted as absolute  evidence of the presence or absence of disease.     NO COLLECTION DATE RECEIVED. WE HAVE USED  THE DATE THE SPECIMEN WAS RECEIVED BY THIS  LABORATORY AS THE COLLECTION DATE. IF THIS  IS INCORRECT, PLEASE CONTACT CLIENT SERVICES.  PHONE  "NUMBER: [phone]  Test Performed at:  [facility]  [address] [place]  [address]  ANGELINA DAVILA MD       Recent Labs   Lab Test  03/02/17   0941  09/12/16   0856  11/11/15   0853  12/01/14   0902   CHOL  153  148  127  146   HDL  50  42  48  48   LDL  76  77  52  66   TRIG  134  146  135  162*   CHOLHDLRATIO   --    --   2.6  3.0   NHDL  103  106   --    --        Reviewed orders with patient. Reviewed health maintenance and updated orders accordingly - Yes    Reviewed and updated as needed this visit by clinical staff  Tobacco  Allergies  Meds         Reviewed and updated as needed this visit by Provider        Past Medical History:   Diagnosis Date     CAD (coronary artery disease)     diffuse CAD      Chest pain     chronic stable     Chronic stable angina (H)      Contact dermatitis and other eczema, due to unspecified cause      DEPRESSIVE DISORDER NEC      Dyslipidemia      Family history of other cardiovascular diseases     Mother and Father     HTN (hypertension)      IRRITABLE COLON      Mild aortic stenosis      Personal history of urinary calculi      Skin cancer, basal cell 2008     SOB (shortness of breath)      Varicose veins       Past Surgical History:   Procedure Laterality Date     ANGIOGRAM  11/17/2015    Med Tx, no flow limiting lesions     C URETER ENDOSCOPY THRU URETEROSTOMY REMV FB OR CALCULUS  2001    dr de la garza     EYE EXAM ESTABLISHED PT  2009     HC COLONOSCOPY THRU STOMA, DIAGNOSTIC  2005     HC EXCISE VARICOCELE      \"cautery\" through intra venous approach     HC KNEE SCOPE, DIAGNOSTIC  2004    Arthroscopy, Knee/left knee      HC REPAIR ING HERNIA,5+Y/O,REDUCIBL  1990's    Inguinal Hernia Repair  Right     HC VASECTOMY UNILAT/BILAT W POSTOP SEMEN      Vasectomy     TEST NOT FOUND  2002    Per cut removal of L kidney stone       ROS:  C: NEGATIVE for fever, chills, change in weight  I: NEGATIVE for worrisome rashes, moles or lesions  E: " "NEGATIVE for vision changes or irritation  ENT: NEGATIVE for ear, mouth and throat problems  R: NEGATIVE for significant cough or SOB  CV: NEGATIVE for chest pain, palpitations or peripheral edema  GI: NEGATIVE for nausea, abdominal pain, heartburn, or change in bowel habits   male: negative for dysuria, hematuria, decreased urinary stream, erectile dysfunction, urethral discharge  M: NEGATIVE for significant arthralgias or myalgia  N: NEGATIVE for weakness, dizziness or paresthesias  E: NEGATIVE for temperature intolerance, skin/hair changes  P: NEGATIVE for changes in mood or affect    Problem list, Medication list, Allergies, and Medical/Social/Surgical histories reviewed in [facility] and updated as appropriate.  Labs reviewed in EPIC  BP Readings from Last 3 Encounters:   05/09/17 128/74   05/02/17 118/78   04/11/17 136/68    Wt Readings from Last 3 Encounters:   05/09/17 87.7 kg (193 lb 6.4 oz)   05/02/17 88.9 kg (196 lb)   04/11/17 89 kg (196 lb 3.2 oz)                  Patient Active Problem List   Diagnosis     Calculus of kidney     Allergic rhinitis     Mixed hyperlipidemia     History of colonic polyps     Essential hypertension, benign     Health Care Home     ACP (advance care planning)     DJD (degenerative joint disease) of knee     Abnormal glucose     Sprain of thoracic region     Chest pain     SOB (shortness of breath)     HTN (hypertension)     Varicose veins     Coronary artery disease involving native coronary artery of native heart without angina pectoris     Past Surgical History:   Procedure Laterality Date     ANGIOGRAM  11/17/2015    Med Tx, no flow limiting lesions     C URETER ENDOSCOPY THRU URETEROSTOMY REMV FB OR CALCULUS  2001    dr de la garza     EYE EXAM ESTABLISHED PT  2009     HC COLONOSCOPY THRU STOMA, DIAGNOSTIC  2005     HC EXCISE VARICOCELE      \"cautery\" through intra venous approach     HC KNEE SCOPE, DIAGNOSTIC  2004    Arthroscopy, Knee/left knee      HC REPAIR ING " "HERNIA,5+Y/O,REDUCIBL      Inguinal Hernia Repair  Right     HC VASECTOMY UNILAT/BILAT W POSTOP SEMEN      Vasectomy     TEST NOT FOUND      Per cut removal of L kidney stone       Social History   Substance Use Topics     Smoking status: Former Smoker     Types: Cigars     Quit date: 1975     Smokeless tobacco: Never Used      Comment: 2-3 cigars     Alcohol use 2.0 oz/week     4 Glasses of wine per week      Comment: 1-2 glass of wine daily     Family History   Problem Relation Age of Onset     HEART DISEASE Mother      91     Cancer - colorectal Mother      HEART DISEASE Father       70s     DIABETES Maternal Aunt      Alzheimer Disease No family hx of      CANCER No family hx of      Prostate Cancer No family hx of          Current Outpatient Prescriptions   Medication Sig Dispense Refill     rosuvastatin (CRESTOR) 40 MG tablet Take 1 tablet (40 mg) by mouth daily 90 tablet 3     hydrochlorothiazide (HYDRODIURIL) 25 MG tablet Take 1 tablet (25 mg) by mouth daily 90 tablet 0     naproxen sodium (ALEVE) 220 MG tablet Take 220 mg by mouth as needed for moderate pain       coenzyme Q-10 capsule Take 1 capsule by mouth daily 90 capsule 3     aspirin 81 MG tablet Take by mouth daily       COMPRESSION STOCKINGS 1 each daily 2 each 4     nitroglycerin (NITROSTAT) 0.4 MG SL tablet Place under the tongue every 5 minutes as needed       Allergies   Allergen Reactions     Amoxicillin      severe rash     Contrast Dye Hives     Patient states this was years ago and he believes he has had dye since that time without problems.     OBJECTIVE:     /74 (BP Location: Right arm, Patient Position: Chair, Cuff Size: Adult Large)  Pulse 65  Temp 98.1  F (36.7  C) (Oral)  Ht 1.803 m (5' 11\")  Wt 87.7 kg (193 lb 6.4 oz)  SpO2 98%  BMI 26.97 kg/m2  EXAM:  GENERAL: healthy, alert and no distress  EYES: Eyes grossly normal to inspection, PERRL and conjunctivae and sclerae normal  HENT: ear canals and TM's " normal, nose and mouth without ulcers or lesions  NECK: no adenopathy, no asymmetry, masses, or scars and thyroid normal to palpation  RESP: lungs clear to auscultation - no rales, rhonchi or wheezes  CV: regular rate and rhythm, normal S1 S2, no S3 or S4, no murmur, click or rub, no peripheral edema and peripheral pulses strong  ABDOMEN: soft, nontender, no hepatosplenomegaly, no masses and bowel sounds normal   (male): normal male genitalia without lesions or urethral discharge, likely left inguinal hernia  RECTAL (male): deferred  MS: no gross musculoskeletal defects noted, no edema  SKIN: no suspicious lesions or rashes  NEURO: Normal strength and tone, mentation intact and speech normal  PSYCH: mentation appears normal, affect normal/bright  LYMPH: no cervical, supraclavicular, axillary, or inguinal adenopathy    ASSESSMENT/PLAN:     (Z00.00) Routine general medical examination at a health care facility  (primary encounter diagnosis)  Comment: discussed preventitive healthcare   Plan: HCL PSA, SCREENING ([facility]), Hepatits C antibody        ([facility]), VENOUS COLLECTION        Continue to work on healthy diet and exercise, discussed healthy habits     (I25.10) Coronary artery disease involving native coronary artery of native heart without angina pectoris  Comment: stable symptomatically   Plan: COMPREHENSIVE METABOLIC PANEL ([facility]) XCMP,         VENOUS COLLECTION        continue current medications at current doses     (E78.2) Mixed hyperlipidemia  Comment: control uncertain-was switched from lipitor to crestor 2 mo ago due to side effects   Plan: Lipid Profile ([facility]), COMPREHENSIVE METABOLIC         PANEL ([facility]) XCMP, VENOUS COLLECTION        Recheck today as     (I10) Essential hypertension  Comment: well controlled  Plan: COMPREHENSIVE METABOLIC PANEL ([facility]) XCMP,         VENOUS COLLECTION        continue current medications at current doses     (R73.09) Abnormal glucose  Comment:   Plan: COMPREHENSIVE  "METABOLIC PANEL ([facility]) XCMP,         VENOUS COLLECTION        Continue to work on healthy diet and exercise, discussed healthy habits     (J31.0) Chronic rhinitis  Comment: Patient is having persistent symptoms despite previous therapies.   Plan: recommend trial flonase OTC    (Z71.89) ACP (advance care planning)  Comment:   Plan:     COUNSELING:  Reviewed preventive health counseling, as reflected in patient instructions       Regular exercise       Healthy diet/nutrition       Vision screening       Hearing screening       Consider Hep C screening for patients born between 1945 and 1965       Colon cancer screening       Prostate cancer screening         reports that he quit smoking about 42 years ago. His smoking use included Cigars. He has never used smokeless tobacco.    Estimated body mass index is 26.97 kg/(m^2) as calculated from the following:    Height as of this encounter: 1.803 m (5' 11\").    Weight as of this encounter: 87.7 kg (193 lb 6.4 oz).   Weight management plan: Discussed healthy diet and exercise guidelines and patient will follow up in 6 months in clinic to re-evaluate.    Counseling Resources:  ATP IV Guidelines  Pooled Cohorts Equation Calculator  FRAX Risk Assessment  ICSI Preventive Guidelines  Dietary Guidelines for Americans, 2010  USDA's MyPlate  ASA Prophylaxis  Lung CA Screening    Nilesh Guzman MD  [facility]  "

## 2017-05-09 NOTE — MR AVS SNAPSHOT
After Visit Summary   5/9/2017    Nilesh Dos Santos    MRN: [id number]           Patient Information     Date Of Birth          1945        Visit Information        Provider Department      5/9/2017 9:30 AM Nilesh Guzman MD [facility]        Today's Diagnoses     Routine general medical examination at a health care facility    -  1    Coronary artery disease involving native coronary artery of native heart without angina pectoris        Mixed hyperlipidemia        Essential hypertension        Abnormal glucose        Chronic rhinitis        ACP (advance care planning)        Left inguinal hernia           Follow-ups after your visit        Follow-up notes from your care team     Return in about 6 months (around 11/9/2017).      Who to contact     If you have questions or need follow up information about today's clinic visit or your schedule please contact [facility] directly at [phone].  Normal or non-critical lab and imaging results will be communicated to you by TapInfluencehart, letter or phone within 4 business days after the clinic has received the results. If you do not hear from us within 7 days, please contact the clinic through TapInfluencehart or phone. If you have a critical or abnormal lab result, we will notify you by phone as soon as possible.  Submit refill requests through [employer] or call your pharmacy and they will forward the refill request to us. Please allow 3 business days for your refill to be completed.          Additional Information About Your Visit        MyChart Information     [employer] gives you secure access to your electronic health record. If you see a primary care provider, you can also send messages to your care team and make appointments. If you have questions, please call your primary care clinic.  If you do not have a primary care provider, please call [phone] and they will assist you.        Care EveryWhere ID      "This is your Care EveryWhere ID. This could be used by other organizations to access your [place] medical records  [id number]        Your Vitals Were     Pulse Temperature Height Pulse Oximetry BMI (Body Mass Index)       65 98.1  F (36.7  C) (Oral) 1.803 m (5' 11\") 98% 26.97 kg/m2        Blood Pressure from Last 3 Encounters:   05/09/17 128/74   05/02/17 118/78   04/11/17 136/68    Weight from Last 3 Encounters:   05/09/17 87.7 kg (193 lb 6.4 oz)   05/02/17 88.9 kg (196 lb)   04/11/17 89 kg (196 lb 3.2 oz)              We Performed the Following     COMPREHENSIVE METABOLIC PANEL ([facility]) XCMP     HCL PSA, SCREENING ([facility])     Hepatits C antibody ([facility])     Lipid Profile ([facility])     VENOUS COLLECTION        Primary Care Provider Office Phone # Fax #    Nilesh Justin Guzman [phone]       [facility] [address]  [facility] [address]        Thank you!     Thank you for choosing [facility]  for your care. Our goal is always to provide you with excellent care. Hearing back from our patients is one way we can continue to improve our services. Please take a few minutes to complete the written survey that you may receive in the mail after your visit with us. Thank you!             Your Updated Medication List - Protect others around you: Learn how to safely use, store and throw away your medicines at www.disposemymeds.org.          This list is accurate as of: 5/9/17 10:02 AM.  Always use your most recent med list.                   Brand Name Dispense Instructions for use    ALEVE 220 MG tablet   Generic drug:  naproxen sodium      Take 220 mg by mouth as needed for moderate pain       aspirin 81 MG tablet      Take by mouth daily       coenzyme Q-10 capsule     90 capsule    Take 1 capsule by mouth daily       COMPRESSION STOCKINGS     2 each    1 each daily       hydrochlorothiazide 25 MG tablet    HYDRODIURIL    90 tablet    Take 1 " tablet (25 mg) by mouth daily       NITROSTAT 0.4 MG sublingual tablet   Generic drug:  nitroglycerin      Place under the tongue every 5 minutes as needed       rosuvastatin 40 MG tablet    CRESTOR    90 tablet    Take 1 tablet (40 mg) by mouth daily

## 2017-05-10 ENCOUNTER — TELEPHONE (OUTPATIENT)
Dept: FAMILY MEDICINE | Facility: CLINIC | Age: 72
End: 2017-05-10

## 2017-05-10 DIAGNOSIS — E87.6 HYPOKALEMIA: Primary | ICD-10-CM

## 2017-05-10 LAB
ABBOTT PSA - QUEST: 0.3 NG/ML
ALBUMIN SERPL-MCNC: 4.3 G/DL (ref 3.6–5.1)
ALBUMIN/GLOB SERPL: 1.5 (CALC) (ref 1–2.5)
ALP SERPL-CCNC: 47 U/L (ref 40–115)
ALT SERPL-CCNC: 17 U/L (ref 9–46)
AST SERPL-CCNC: 20 U/L (ref 10–35)
BILIRUB SERPL-MCNC: 0.9 MG/DL (ref 0.2–1.2)
BUN SERPL-MCNC: 14 MG/DL (ref 7–25)
BUN/CREATININE RATIO: ABNORMAL (CALC) (ref 6–22)
CALCIUM SERPL-MCNC: 9.7 MG/DL (ref 8.6–10.3)
CHLORIDE SERPLBLD-SCNC: 99 MMOL/L (ref 98–110)
CHOLEST SERPL-MCNC: 144 MG/DL (ref 125–200)
CHOLEST/HDLC SERPL: 2.9 (CALC)
CO2 SERPL-SCNC: 29 MMOL/L (ref 20–31)
CREAT SERPL-MCNC: 1.15 MG/DL (ref 0.7–1.18)
EGFR AFRICAN AMERICAN - QUEST: 73 ML/MIN/1.73M2
GFR SERPL CREATININE-BSD FRML MDRD: 63 ML/MIN/1.73M2
GLOBULIN, CALCULATED - QUEST: 2.9 G/DL (CALC) (ref 1.9–3.7)
GLUCOSE - QUEST: 122 MG/DL (ref 65–99)
HCV AB - QUEST: NORMAL
HDLC SERPL-MCNC: 49 MG/DL
LDLC SERPL CALC-MCNC: 67 MG/DL (CALC)
NONHDLC SERPL-MCNC: 95 MG/DL (CALC)
POTASSIUM SERPL-SCNC: 2.9 MMOL/L (ref 3.5–5.3)
PROT SERPL-MCNC: 7.2 G/DL (ref 6.1–8.1)
SIGNAL TO CUT OFF - QUEST: 0.03
SODIUM SERPL-SCNC: 139 MMOL/L (ref 135–146)
TRIGL SERPL-MCNC: 138 MG/DL

## 2017-05-10 RX ORDER — POTASSIUM CHLORIDE 750 MG/1
20 TABLET, EXTENDED RELEASE ORAL DAILY
Qty: 90 TABLET | Refills: 0 | Status: SHIPPED | OUTPATIENT
Start: 2017-05-10 | End: 2018-02-14

## 2017-05-10 ASSESSMENT — PATIENT HEALTH QUESTIONNAIRE - PHQ9: SUM OF ALL RESPONSES TO PHQ QUESTIONS 1-9: 7

## 2017-07-31 DIAGNOSIS — I10 ESSENTIAL HYPERTENSION, BENIGN: ICD-10-CM

## 2017-07-31 RX ORDER — HYDROCHLOROTHIAZIDE 25 MG/1
25 TABLET ORAL DAILY
Qty: 90 TABLET | Refills: 3 | Status: SHIPPED | OUTPATIENT
Start: 2017-07-31 | End: 2018-06-07

## 2018-02-06 DIAGNOSIS — E78.2 MIXED HYPERLIPIDEMIA: ICD-10-CM

## 2018-02-06 RX ORDER — ROSUVASTATIN CALCIUM 40 MG/1
40 TABLET, COATED ORAL DAILY
Qty: 90 TABLET | Refills: 0 | Status: SHIPPED | OUTPATIENT
Start: 2018-02-06 | End: 2018-06-19

## 2018-02-14 ENCOUNTER — OFFICE VISIT (OUTPATIENT)
Dept: FAMILY MEDICINE | Facility: CLINIC | Age: 73
End: 2018-02-14

## 2018-02-14 VITALS
RESPIRATION RATE: 20 BRPM | DIASTOLIC BLOOD PRESSURE: 70 MMHG | HEART RATE: 84 BPM | BODY MASS INDEX: 27.44 KG/M2 | TEMPERATURE: 98.7 F | SYSTOLIC BLOOD PRESSURE: 142 MMHG | WEIGHT: 196 LBS | HEIGHT: 71 IN

## 2018-02-14 DIAGNOSIS — J30.0 CHRONIC VASOMOTOR RHINITIS: Primary | ICD-10-CM

## 2018-02-14 DIAGNOSIS — R05.9 COUGH: ICD-10-CM

## 2018-02-14 PROCEDURE — 99213 OFFICE O/P EST LOW 20 MIN: CPT | Performed by: FAMILY MEDICINE

## 2018-02-14 RX ORDER — MONTELUKAST SODIUM 10 MG/1
10 TABLET ORAL AT BEDTIME
Qty: 30 TABLET | Refills: 0 | Status: SHIPPED | OUTPATIENT
Start: 2018-02-14 | End: 2018-04-11

## 2018-02-14 RX ORDER — BENZONATATE 200 MG/1
200 CAPSULE ORAL 3 TIMES DAILY PRN
Qty: 21 CAPSULE | Refills: 0 | Status: SHIPPED | OUTPATIENT
Start: 2018-02-14 | End: 2018-04-11

## 2018-02-14 RX ORDER — FLUTICASONE PROPIONATE 50 MCG
1-2 SPRAY, SUSPENSION (ML) NASAL DAILY
Qty: 3 BOTTLE | Refills: 1 | Status: SHIPPED | OUTPATIENT
Start: 2018-02-14 | End: 2018-06-29

## 2018-02-14 NOTE — MR AVS SNAPSHOT
After Visit Summary   2/14/2018    Nilesh Dos Santos    MRN: [id number]           Patient Information     Date Of Birth          1945        Visit Information        Provider Department      2/14/2018 12:30 PM Mis Rothman MD [facility]        Today's Diagnoses     Chronic vasomotor rhinitis    -  1    Cough          Care Instructions     Chronic vasomotor rhinitis  (primary encounter diagnosis)  Comment: recurrent winter cough  Plan: fluticasone (FLONASE) 50 MCG/ACT spray        Potential medication side effects were discussed with the patient; let me know if any occur.  Monitor    Consider ENT consultation    (R05) Cough  Plan: montelukast (SINGULAIR) 10 MG tablet,         benzonatate (TESSALON) 200 MG capsule        How medications work reviewed/ Symptomatic care with decongestants, fluids, tylenol/advil prn. Use GUAIFENESIN  MG OR TBCR, 1 tab po BID (Twice per day), D: 20, R: 0 for congestion and cough.    In addition, I have suggested that the patient   monitor for symptoms of bacterial infection expecting slow gradual resolution of viral URI as the natural course.              Follow-ups after your visit        Follow-up notes from your care team     Return in about 1 week (around 2/21/2018).      Who to contact     If you have questions or need follow up information about today's clinic visit or your schedule please contact [facility] directly at [phone].  Normal or non-critical lab and imaging results will be communicated to you by MyChart, letter or phone within 4 business days after the clinic has received the results. If you do not hear from us within 7 days, please contact the clinic through MyChart or phone. If you have a critical or abnormal lab result, we will notify you by phone as soon as possible.  Submit refill requests through [employer] or call your pharmacy and they will forward the refill request to us. Please allow 3  "business days for your refill to be completed.          Additional Information About Your Visit        Tableau Softwarehart Information     [employer] gives you secure access to your electronic health record. If you see a primary care provider, you can also send messages to your care team and make appointments. If you have questions, please call your primary care clinic.  If you do not have a primary care provider, please call [phone] and they will assist you.        Care EveryWhere ID     This is your Care EveryWhere ID. This could be used by other organizations to access your [place] medical records  [id number]        Your Vitals Were     Pulse Temperature Respirations Height BMI (Body Mass Index)       84 98.7  F (37.1  C) (Oral) 20 1.803 m (5' 11\") 27.34 kg/m2        Blood Pressure from Last 3 Encounters:   02/14/18 142/70   05/09/17 128/74   05/02/17 118/78    Weight from Last 3 Encounters:   02/14/18 88.9 kg (196 lb)   05/09/17 87.7 kg (193 lb 6.4 oz)   05/02/17 88.9 kg (196 lb)              Today, you had the following     No orders found for display         Today's Medication Changes          These changes are accurate as of 2/14/18 11:59 PM.  If you have any questions, ask your nurse or doctor.               Start taking these medicines.        Dose/Directions    benzonatate 200 MG capsule   Commonly known as:  TESSALON   Used for:  Cough   Started by:  Mis Rothman MD        Dose:  200 mg   Take 1 capsule (200 mg) by mouth 3 times daily as needed for cough   Quantity:  21 capsule   Refills:  0       fluticasone 50 MCG/ACT spray   Commonly known as:  FLONASE   Used for:  Chronic vasomotor rhinitis   Started by:  Mis Rothman MD        Dose:  1-2 spray   Spray 1-2 sprays into both nostrils daily   Quantity:  3 Bottle   Refills:  1       montelukast 10 MG tablet   Commonly known as:  SINGULAIR   Used for:  Cough   Started by:  Mis Rothman MD        Dose:  10 mg   Take 1 tablet (10 mg) by mouth At Bedtime "   Quantity:  30 tablet   Refills:  0            Where to get your medicines      These medications were sent to [id number] [address]  [address] [facility] [address]     Phone:  [phone]     benzonatate 200 MG capsule    fluticasone 50 MCG/ACT spray    montelukast 10 MG tablet                Primary Care Provider Office Phone # Fax #    Nilesh Guzman [phone] [address] [facility] [address]  [facility] [address]        Equal Access to Services     [facility]: Hadii aad ku hadasho Soomaali, waaxda luqadaha, qaybta kaalmada adeegyada, waxay koreyin hayaan adedavid ross . So [facility] [phone].    ATENCIÓN: Si habla español, tiene a abbasi disposición servicios gratuitos de asistencia lingüística. [facility] [phone].    We comply with applicable federal civil rights laws and [place] laws. We do not discriminate on the basis of race, color, national origin, age, disability, sex, sexual orientation, or gender identity.            Thank you!     Thank you for choosing [facility]  for your care. Our goal is always to provide you with excellent care. Hearing back from our patients is one way we can continue to improve our services. Please take a few minutes to complete the written survey that you may receive in the mail after your visit with us. Thank you!             Your Updated Medication List - Protect others around you: Learn how to safely use, store and throw away your medicines at www.disposemymeds.org.          This list is accurate as of 2/14/18 11:59 PM.  Always use your most recent med list.                   Brand Name Dispense Instructions for use Diagnosis    ALEVE 220 MG tablet   Generic drug:  naproxen sodium      Take 220 mg by mouth as needed for moderate pain        aspirin 81 MG tablet      Take by mouth daily        benzonatate 200 MG capsule    TESSALON    21 capsule    Take 1  capsule (200 mg) by mouth 3 times daily as needed for cough    Cough       coenzyme Q-10 capsule     90 capsule    Take 1 capsule by mouth daily    Mixed hyperlipidemia       COMPRESSION STOCKINGS     2 each    1 each daily    Varicose veins of lower extremities with other complications       fluticasone 50 MCG/ACT spray    FLONASE    3 Bottle    Spray 1-2 sprays into both nostrils daily    Chronic vasomotor rhinitis       hydrochlorothiazide 25 MG tablet    HYDRODIURIL    90 tablet    Take 1 tablet (25 mg) by mouth daily    Essential hypertension, benign       montelukast 10 MG tablet    SINGULAIR    30 tablet    Take 1 tablet (10 mg) by mouth At Bedtime    Cough       NITROSTAT 0.4 MG sublingual tablet   Generic drug:  nitroGLYcerin      Place under the tongue every 5 minutes as needed        rosuvastatin 40 MG tablet    CRESTOR    90 tablet    Take 1 tablet (40 mg) by mouth daily    Mixed hyperlipidemia

## 2018-02-14 NOTE — PATIENT INSTRUCTIONS
Chronic vasomotor rhinitis  (primary encounter diagnosis)  Comment: recurrent winter cough  Plan: fluticasone (FLONASE) 50 MCG/ACT spray        Potential medication side effects were discussed with the patient; let me know if any occur.  Monitor    Consider ENT consultation    (R05) Cough  Plan: montelukast (SINGULAIR) 10 MG tablet,         benzonatate (TESSALON) 200 MG capsule        How medications work reviewed/ Symptomatic care with decongestants, fluids, tylenol/advil prn. Use GUAIFENESIN  MG OR TBCR, 1 tab po BID (Twice per day), D: 20, R: 0 for congestion and cough.    In addition, I have suggested that the patient   monitor for symptoms of bacterial infection expecting slow gradual resolution of viral URI as the natural course.

## 2018-02-14 NOTE — PROGRESS NOTES
SUBJECTIVE: 72 year old male complaining of every winter getting a dry cough sometimes with nasal clear drainage for 10 year(s). Has been treated with antibiotics, Singulair and tessalon perls with not much relief but symptoms finally darius  The patient describes his cough the last 2 weeks has been in spasms and is difficult to control without a cough drop all the time.   The patient denies a history of SOB between coughing, fever, GI symptoms or fatigue.   Smoking history: No.   Relevant past medical history: positive for elevated BP and cholesterol well controlled. Had a nasal surgery when post nasal drainage became an issue     ROS: 10 point ROS neg other than the symptoms noted above in the HPI.      OBJECTIVE: The patient appears healthy, alert, no distress, cooperative and smiling.   EARS: negative  NOSE/SINUS: positive findings: mucosa erythematous and swollen, clear rhinorrhea   THROAT: normal, no tonsillar hypertrophy, no exudates present and post nasal drainage   NECK:Neck supple. No adenopathy. Thyroid symmetric, normal size,, Carotids without bruits.   CHEST: Clear with dry barking cough. No rales, rhonchi or wheezing    ASSESSMENT: (J30.0) Chronic vasomotor rhinitis  (primary encounter diagnosis)  Comment: recurrent winter cough  Plan: fluticasone (FLONASE) 50 MCG/ACT spray        Potential medication side effects were discussed with the patient; let me know if any occur.  Monitor/ recheck 1 week    Consider ENT consultation    (R05) Cough  Plan: montelukast (SINGULAIR) 10 MG tablet,         benzonatate (TESSALON) 200 MG capsule        How medications work reviewed/ Symptomatic care with decongestants, fluids, tylenol/advil prn. Use GUAIFENESIN  MG OR TBCR, 1 tab po BID (Twice per day), D: 20, R: 0 for congestion and cough.    In addition, I have suggested that the patient   monitor for symptoms of bacterial infection expecting slow gradual resolution of viral URI as the natural course.    Called  on 2/21/2018 for update. Markedly improved. Happy with results.

## 2018-02-14 NOTE — NURSING NOTE
Nilesh Dos Santos is here for a cough for the past few months. Has been getting worse the past few days.    Questioned patient about current smoking habits.  Pt. quit smoking some time ago.  PULSE regular  My Chart: active  CLASSIFICATION OF OVERWEIGHT AND OBESITY BY BMI                        Obesity Class           BMI(kg/m2)  Underweight                                    < 18.5  Normal                                         18.5-24.9  Overweight                                     25.0-29.9  OBESITY                     I                  30.0-34.9                             II                 35.0-39.9  EXTREME OBESITY             III                >40                            Patient's  BMI Body mass index is 27.34 kg/(m^2).  http://hin.nhlbi.nih.gov/menuplanner/menu.cgi  Pre-visit planning  Immunizations - up to date  Colonoscopy - is up to date  Mammogram -   Asthma -   PHQ9 -    BARTOLO-7 -

## 2018-04-11 ENCOUNTER — OFFICE VISIT (OUTPATIENT)
Dept: FAMILY MEDICINE | Facility: CLINIC | Age: 73
End: 2018-04-11

## 2018-04-11 VITALS
WEIGHT: 197.6 LBS | BODY MASS INDEX: 27.66 KG/M2 | HEIGHT: 71 IN | RESPIRATION RATE: 16 BRPM | DIASTOLIC BLOOD PRESSURE: 72 MMHG | HEART RATE: 74 BPM | OXYGEN SATURATION: 98 % | SYSTOLIC BLOOD PRESSURE: 112 MMHG | TEMPERATURE: 98.2 F

## 2018-04-11 DIAGNOSIS — J06.9 VIRAL UPPER RESPIRATORY TRACT INFECTION: ICD-10-CM

## 2018-04-11 DIAGNOSIS — R05.9 COUGH: ICD-10-CM

## 2018-04-11 DIAGNOSIS — R07.0 THROAT PAIN: Primary | ICD-10-CM

## 2018-04-11 LAB — S PYO AG THROAT QL IA.RAPID: NORMAL

## 2018-04-11 PROCEDURE — 87880 STREP A ASSAY W/OPTIC: CPT | Performed by: FAMILY MEDICINE

## 2018-04-11 PROCEDURE — 99213 OFFICE O/P EST LOW 20 MIN: CPT | Performed by: FAMILY MEDICINE

## 2018-04-11 PROCEDURE — 87070 CULTURE OTHR SPECIMN AEROBIC: CPT | Performed by: FAMILY MEDICINE

## 2018-04-11 RX ORDER — BENZONATATE 200 MG/1
200 CAPSULE ORAL 3 TIMES DAILY PRN
Qty: 21 CAPSULE | Refills: 0 | Status: SHIPPED | OUTPATIENT
Start: 2018-04-11 | End: 2018-07-31

## 2018-04-11 RX ORDER — MONTELUKAST SODIUM 10 MG/1
10 TABLET ORAL AT BEDTIME
Qty: 30 TABLET | Refills: 0 | Status: SHIPPED | OUTPATIENT
Start: 2018-04-11 | End: 2018-06-29

## 2018-04-11 RX ORDER — GUAIFENESIN 600 MG/1
1200 TABLET, EXTENDED RELEASE ORAL 2 TIMES DAILY PRN
Qty: 40 TABLET | Refills: 0 | Status: SHIPPED | OUTPATIENT
Start: 2018-04-11 | End: 2018-05-04

## 2018-04-11 NOTE — MR AVS SNAPSHOT
After Visit Summary   4/11/2018    Nilesh Dos Santos    MRN: [id number]           Patient Information     Date Of Birth          1945        Visit Information        Provider Department      4/11/2018 12:15 PM Mis Rothman MD [facility]        Today's Diagnoses     Throat pain    -  1    Cough        Viral upper respiratory tract infection          Care Instructions    Throat pain  (primary encounter diagnosis)  Plan: RAPID STREP (BFP), THROAT CULTURE (BFP)        Await culture    (R05) Cough  Plan: montelukast (SINGULAIR) 10 MG tablet,         benzonatate (TESSALON) 200 MG capsule        Continue. Potential medication side effects were discussed with the patient; let me know if any occur.      (J06.9,  B97.89) Viral upper respiratory tract infection  Plan: guaiFENesin (MUCINEX) 600 MG 12 hr tablet        Symptomatic care with decongestants, fluids, tylenol/advil prn. Use GUAIFENESIN  MG OR TBCR, 1 tab po BID (Twice per day), D: 20, R: 0 for congestion and cough.    In addition, I have suggested that the patient   monitor for symptoms of bacterial infection expecting slow gradual resolution of viral URI as the natural course.            Follow-ups after your visit        Follow-up notes from your care team     Return if symptoms worsen or fail to improve.      Your next 10 appointments already scheduled     Apr 19, 2018  9:10 AM CDT   LAB with [facility]   [facility] ([facility])    [address] [place] [id number] [phone]           Please do not eat 10-12 hours before your appointment if you are coming in fasting for labs on lipids, cholesterol, or glucose (sugar). This does not apply to pregnant women. Water, hot tea and black coffee (with nothing added) are okay. Do not drink other fluids, diet soda or chew gum.            Apr 19, 2018 10:15 AM CDT   Return Visit with Bharat Mata MD  "  [facility] ([facility])    [address]  [facility] [address] [phone] OPT 2              Who to contact     If you have questions or need follow up information about today's clinic visit or your schedule please contact [facility] directly at [phone].  Normal or non-critical lab and imaging results will be communicated to you by MyChart, letter or phone within 4 business days after the clinic has received the results. If you do not hear from us within 7 days, please contact the clinic through [employer] or phone. If you have a critical or abnormal lab result, we will notify you by phone as soon as possible.  Submit refill requests through [employer] or call your pharmacy and they will forward the refill request to us. Please allow 3 business days for your refill to be completed.          Additional Information About Your Visit        [employer] Information     [employer] gives you secure access to your electronic health record. If you see a primary care provider, you can also send messages to your care team and make appointments. If you have questions, please call your primary care clinic.  If you do not have a primary care provider, please call [phone] and they will assist you.        Care EveryWhere ID     This is your Care EveryWhere ID. This could be used by other organizations to access your [place] medical records  [id number]        Your Vitals Were     Pulse Temperature Respirations Height Pulse Oximetry BMI (Body Mass Index)    74 98.2  F (36.8  C) (Oral) 16 1.803 m (5' 11\") 98% 27.56 kg/m2       Blood Pressure from Last 3 Encounters:   04/11/18 112/72   02/14/18 142/70   05/09/17 128/74    Weight from Last 3 Encounters:   04/11/18 89.6 kg (197 lb 9.6 oz)   02/14/18 88.9 kg (196 lb)   05/09/17 87.7 kg (193 lb 6.4 oz)              We Performed the Following     RAPID STREP (BFP)     THROAT CULTURE (BFP)        "   Today's Medication Changes          These changes are accurate as of 4/11/18  1:30 PM.  If you have any questions, ask your nurse or doctor.               Start taking these medicines.        Dose/Directions    guaiFENesin 600 MG 12 hr tablet   Commonly known as:  MUCINEX   Used for:  Viral upper respiratory tract infection   Started by:  Mis Rothman MD        Dose:  1200 mg   Take 2 tablets (1,200 mg) by mouth 2 times daily as needed for congestion   Quantity:  40 tablet   Refills:  0            Where to get your medicines      These medications were sent to [facility] [place] [id number] [place] [address]  [address] [place] [address]     Phone:  [phone]     benzonatate 200 MG capsule    guaiFENesin 600 MG 12 hr tablet    montelukast 10 MG tablet                Primary Care Provider Office Phone # Fax #    Nilesh Guzman [phone] [address] [facility] [address]  [facility] [address]        Equal Access to Services     [facility]: Hadii aad ku hadasho Soomaali, waaxda luqadaha, qaybta kaalmada adeegyada, waxay idiin hayaan adeeg kharash lapineda . So [facility] [phone].    ATENCIÓN: Si habla español, tiene a abbasi disposición servicios gratuitos de asistencia lingüística. [facility] [phone].    We comply with applicable federal civil rights laws and [place] laws. We do not discriminate on the basis of race, color, national origin, age, disability, sex, sexual orientation, or gender identity.            Thank you!     Thank you for choosing [facility]  for your care. Our goal is always to provide you with excellent care. Hearing back from our patients is one way we can continue to improve our services. Please take a few minutes to complete the written survey that you may receive in the mail after your visit with us. Thank you!             Your Updated Medication List - Protect others around you: Learn how to safely use,  store and throw away your medicines at www.disposemymeds.org.          This list is accurate as of 4/11/18  1:30 PM.  Always use your most recent med list.                   Brand Name Dispense Instructions for use Diagnosis    ALEVE 220 MG tablet   Generic drug:  naproxen sodium      Take 220 mg by mouth as needed for moderate pain        aspirin 81 MG tablet      Take by mouth daily        benzonatate 200 MG capsule    TESSALON    21 capsule    Take 1 capsule (200 mg) by mouth 3 times daily as needed for cough    Cough       coenzyme Q-10 capsule     90 capsule    Take 1 capsule by mouth daily    Mixed hyperlipidemia       COMPRESSION STOCKINGS     2 each    1 each daily    Varicose veins of lower extremities with other complications       fluticasone 50 MCG/ACT spray    FLONASE    3 Bottle    Spray 1-2 sprays into both nostrils daily    Chronic vasomotor rhinitis       guaiFENesin 600 MG 12 hr tablet    MUCINEX    40 tablet    Take 2 tablets (1,200 mg) by mouth 2 times daily as needed for congestion    Viral upper respiratory tract infection       hydrochlorothiazide 25 MG tablet    HYDRODIURIL    90 tablet    Take 1 tablet (25 mg) by mouth daily    Essential hypertension, benign       montelukast 10 MG tablet    SINGULAIR    30 tablet    Take 1 tablet (10 mg) by mouth At Bedtime    Cough       NITROSTAT 0.4 MG sublingual tablet   Generic drug:  nitroGLYcerin      Place under the tongue every 5 minutes as needed        rosuvastatin 40 MG tablet    CRESTOR    90 tablet    Take 1 tablet (40 mg) by mouth daily    Mixed hyperlipidemia

## 2018-04-11 NOTE — NURSING NOTE
Willie is here for cough, sore throat, congestion, fatigue, has felt feverish    Pre-Visit Screening :  Immunizations : up to date  Colon Screening : is up to date  Asthma Action Test/Plan : ramon  PHQ9/GAD7 :  Na    Pulse - regular  My Chart - accepts    CLASSIFICATION OF OVERWEIGHT AND OBESITY BY BMI                         Obesity Class           BMI(kg/m2)  Underweight                                    < 18.5  Normal                                         18.5-24.9  Overweight                                     25.0-29.9  OBESITY                     I                  30.0-34.9                              II                 35.0-39.9  EXTREME OBESITY             III                >40                             Patient's  BMI Body mass index is 22.15 kg/(m^2).  http://hin.nhlbi.nih.gov/menuplanner/menu.cgi  Questioned patient about current smoking habits.  Pt. has never smoked.  The patient has verbalized that it is ok to leave a detailed voice message on the patient's home voicemail with results/recommendations from this visit.       Verified [phone]  phone number:

## 2018-04-11 NOTE — PROGRESS NOTES
SUBJECTIVE: 73 year old male complaining of cough followed by a sore throat for 2 day(s).   The patient describes using Flonase since our last visit and doing well with his chronic winter cough and possible vasomotor rhinitis diagnosis/ see last visit 2/2018. He tried tessalon Perles last night and restarted the Singulair last night. Better this morning but throat is still sore  The patient denies a history of GI complaints, SOB or rash.   Smoking history: No.   Relevant past medical history: positive for allergic rhinitis, hypertension, elevated cholesterol, ASCVD, and kidney stones    OBJECTIVE: The patient appears healthy, alert, no distress, cooperative, smiling and over weight.   EARS: negative  NOSE/SINUS: positive findings: mucosa erythematous and swollen, clear rhinorrhea   THROAT: moderate erythema, no tonsillar hypertrophy, no exudates present, throat culture taken, rapid strep done and post nasal drainage   NECK:Neck supple. No adenopathy. Thyroid symmetric, normal size,, Carotids without bruits.   CHEST: Clear with dry cough    Skin: seborrheic keratosis on the thorax and left nipple      ASSESSMENT: (R07.0) Throat pain  (primary encounter diagnosis)  Plan: RAPID STREP (BFP), THROAT CULTURE (BFP)        Await culture    (R05) Cough  Plan: montelukast (SINGULAIR) 10 MG tablet,         benzonatate (TESSALON) 200 MG capsule        Continue. Potential medication side effects were discussed with the patient; let me know if any occur.      (J06.9,  B97.89) Viral upper respiratory tract infection  Plan: guaiFENesin (MUCINEX) 600 MG 12 hr tablet        Symptomatic care with decongestants, fluids, tylenol/advil prn. Use GUAIFENESIN  MG OR TBCR, 1 tab po BID (Twice per day), D: 20, R: 0 for congestion and cough.    In addition, I have suggested that the patient   monitor for symptoms of bacterial infection expecting slow gradual resolution of viral URI as the natural course.

## 2018-04-11 NOTE — PATIENT INSTRUCTIONS
Throat pain  (primary encounter diagnosis)  Plan: RAPID STREP (BFP), THROAT CULTURE (BFP)        Await culture    (R05) Cough  Plan: montelukast (SINGULAIR) 10 MG tablet,         benzonatate (TESSALON) 200 MG capsule        Continue. Potential medication side effects were discussed with the patient; let me know if any occur.      (J06.9,  B97.89) Viral upper respiratory tract infection  Plan: guaiFENesin (MUCINEX) 600 MG 12 hr tablet        Symptomatic care with decongestants, fluids, tylenol/advil prn. Use GUAIFENESIN  MG OR TBCR, 1 tab po BID (Twice per day), D: 20, R: 0 for congestion and cough.    In addition, I have suggested that the patient   monitor for symptoms of bacterial infection expecting slow gradual resolution of viral URI as the natural course.

## 2018-04-13 LAB — THROAT CULTURE: NORMAL

## 2018-04-19 ENCOUNTER — TELEPHONE (OUTPATIENT)
Dept: CARDIOLOGY | Facility: CLINIC | Age: 73
End: 2018-04-19

## 2018-04-19 ENCOUNTER — OFFICE VISIT (OUTPATIENT)
Dept: CARDIOLOGY | Facility: CLINIC | Age: 73
End: 2018-04-19
Attending: INTERNAL MEDICINE
Payer: COMMERCIAL

## 2018-04-19 VITALS
HEART RATE: 65 BPM | WEIGHT: 195 LBS | DIASTOLIC BLOOD PRESSURE: 82 MMHG | SYSTOLIC BLOOD PRESSURE: 138 MMHG | HEIGHT: 71 IN | BODY MASS INDEX: 27.3 KG/M2

## 2018-04-19 DIAGNOSIS — I25.10 CORONARY ARTERY DISEASE INVOLVING NATIVE HEART, ANGINA PRESENCE UNSPECIFIED, UNSPECIFIED VESSEL OR LESION TYPE: ICD-10-CM

## 2018-04-19 DIAGNOSIS — I25.10 CAD (CORONARY ARTERY DISEASE): Primary | ICD-10-CM

## 2018-04-19 DIAGNOSIS — I25.10 CAD (CORONARY ARTERY DISEASE): ICD-10-CM

## 2018-04-19 LAB
ALT SERPL W P-5'-P-CCNC: 7 U/L (ref 5–30)
ANION GAP SERPL CALCULATED.3IONS-SCNC: ABNORMAL MMOL/L (ref 6–17)
BUN SERPL-MCNC: 18 MG/DL (ref 7–30)
CALCIUM SERPL-MCNC: 9.4 MG/DL (ref 8.5–10.5)
CHLORIDE SERPL-SCNC: 99 MMOL/L (ref 98–107)
CHOLEST SERPL-MCNC: 113 MG/DL
CO2 SERPL-SCNC: 33 MMOL/L (ref 23–29)
CREAT SERPL-MCNC: 1.28 MG/DL (ref 0.7–1.3)
GFR SERPL CREATININE-BSD FRML MDRD: 55 ML/MIN/1.7M2
GLUCOSE SERPL-MCNC: 116 MG/DL (ref 70–105)
HDLC SERPL-MCNC: 29 MG/DL
LDLC SERPL CALC-MCNC: 58 MG/DL
MAGNESIUM SERPL-MCNC: 2.4 MG/DL (ref 1.6–2.3)
NONHDLC SERPL-MCNC: 84 MG/DL
POTASSIUM SERPL-SCNC: 2.9 MMOL/L (ref 3.5–5.1)
SODIUM SERPL-SCNC: 140 MMOL/L (ref 136–145)
TRIGL SERPL-MCNC: 128 MG/DL

## 2018-04-19 PROCEDURE — 99213 OFFICE O/P EST LOW 20 MIN: CPT | Performed by: INTERNAL MEDICINE

## 2018-04-19 PROCEDURE — 83735 ASSAY OF MAGNESIUM: CPT | Performed by: INTERNAL MEDICINE

## 2018-04-19 PROCEDURE — 36415 COLL VENOUS BLD VENIPUNCTURE: CPT | Performed by: INTERNAL MEDICINE

## 2018-04-19 PROCEDURE — 84460 ALANINE AMINO (ALT) (SGPT): CPT | Performed by: INTERNAL MEDICINE

## 2018-04-19 PROCEDURE — 80061 LIPID PANEL: CPT | Performed by: INTERNAL MEDICINE

## 2018-04-19 PROCEDURE — 80048 BASIC METABOLIC PNL TOTAL CA: CPT | Performed by: INTERNAL MEDICINE

## 2018-04-19 RX ORDER — POTASSIUM CHLORIDE 1500 MG/1
20 TABLET, EXTENDED RELEASE ORAL DAILY
Qty: 30 TABLET | Refills: 11 | Status: SHIPPED | OUTPATIENT
Start: 2018-04-19 | End: 2018-07-31

## 2018-04-19 NOTE — LETTER
4/19/2018      [facility]  [address]  [facility] [address]      RE: Nilesh Hulle       Dear Colleague,    I had the pleasure of seeing Nilesh Dos Santos in the [facility].    Service Date: 04/19/2018      REASON FOR CLINIC VISIT:  Followup CAD.      HISTORY OF PRESENT ILLNESS:  Mr. Dos Santos is a very pleasant 73-year-old gentleman with history of chronic stable angina in past, CAD with coronary CT angiogram in 2013 showing diffuse coronary artery calcification/disease but no significant obstructive disease.  However, due to exertional chest discomfort, the patient underwent coronary angiogram in 2015 when he was found to have moderate stenosis involving the right posterior descending and posterolateral branch with FFR of 0.9 and 0.94 respectively, indicating they were not flow limiting.  Today, patient is coming for routine followup accompanied by his wife.  Overall, cardiac status-wise, the patient is doing quite well.  He is no longer experiencing any chest discomfort or shortness of breath with physical activity.  He did shovel snow intermittently this season.  He is on Crestor, baby aspirin, hydrochlorothiazide.  This season, he has struggled with colds and is currently recovering from a cold for the last 2 weeks and is taking antitussive medications.  Lipid panel today shows LDL well controlled at 58, triglycerides 128.  Blood pressure is reasonably controlled.  Remarkably, the patient had BMP checked through his primary care physician's office last summer that showed potassium of 2.9.  I do not see followup labs.  He is not on any potassium supplementation at present.  I have asked the lab to add on BMP today from lipid panel labs drawn earlier today.  Echocardiogram last year showed normal LV function with borderline ascending aorta dilatation.  He does not use any tobacco.      PHYSICAL EXAMINATION:   VITAL SIGNS:  Blood pressure 138/82,  heart rate 65 and regular, weight 195 pounds, BMI 27.25.   GENERAL:  The patient appears pleasant, comfortable.   NECK:  Normal JVP, no bruit.   CARDIOVASCULAR SYSTEM:  S1, S2 normal, no murmur, rub or gallop.   RESPIRATORY SYSTEM:  Clear to auscultation bilaterally.    GASTROINTESTINAL SYSTEM:  Abdomen soft, nontender.   EXTREMITIES:  No pitting pedal edema.    NEUROLOGICAL:  Alert, oriented x3.   PSYCHIATRIC:  Normal affect.   SKIN:  No obvious rash.   HEENT:  No pallor or icterus.      IMPRESSION AND PLAN:  A very pleasant 73-year-old gentleman with history of CAD.  In the past, he had some exertional chest discomfort consistent with stable angina.  Fortunately, he is no longer having any exertional chest discomfort.  As noted above, coronary CT angiogram and subsequently coronary angiogram have shown moderate coronary artery disease.  LDL is well controlled.  Blood pressure is reasonably controlled.  In the past, he was on beta blocker but that had to be discontinued because of fatigue and poor sleep.  He was also on Imdur in the past but that had to be also discontinued because of side effect.  At present, he appears stable from cardiac standpoint of view and if he continues to feel well, we can see him back in 1 year, sooner if he notices any exertional symptoms.  As noted above, I recommended adding BMP to the labs drawn today and my office is going to update him with the results of the BMP.         SHAUNA BLOCK MD             D: 2018   T: 2018   MT: RANDEE      Name:     MIRA MACHADO   MRN:      [id number]        Account:      [id number]   :      1945           Service Date: 2018      Document: [id number]           Outpatient Encounter Prescriptions as of 2018   Medication Sig Dispense Refill     aspirin 81 MG tablet Take by mouth daily       benzonatate (TESSALON) 200 MG capsule Take 1 capsule (200 mg) by mouth 3 times daily as needed for cough 21 capsule 0     coenzyme Q-10  capsule Take 1 capsule by mouth daily 90 capsule 3     COMPRESSION STOCKINGS 1 each daily 2 each 4     fluticasone (FLONASE) 50 MCG/ACT spray Spray 1-2 sprays into both nostrils daily 3 Bottle 1     guaiFENesin (MUCINEX) 600 MG 12 hr tablet Take 2 tablets (1,200 mg) by mouth 2 times daily as needed for congestion 40 tablet 0     hydrochlorothiazide (HYDRODIURIL) 25 MG tablet Take 1 tablet (25 mg) by mouth daily 90 tablet 3     montelukast (SINGULAIR) 10 MG tablet Take 1 tablet (10 mg) by mouth At Bedtime 30 tablet 0     naproxen sodium (ALEVE) 220 MG tablet Take 220 mg by mouth as needed for moderate pain       nitroglycerin (NITROSTAT) 0.4 MG SL tablet Place under the tongue every 5 minutes as needed       rosuvastatin (CRESTOR) 40 MG tablet Take 1 tablet (40 mg) by mouth daily 90 tablet 0     [DISCONTINUED] benzonatate (TESSALON) 200 MG capsule Take 1 capsule (200 mg) by mouth 3 times daily as needed for cough 21 capsule 0     [DISCONTINUED] montelukast (SINGULAIR) 10 MG tablet Take 1 tablet (10 mg) by mouth At Bedtime 30 tablet 0     No facility-administered encounter medications on file as of 4/19/2018.        Again, thank you for allowing me to participate in the care of your patient.      Sincerely,    Bharat Mata MD     [facility]

## 2018-04-19 NOTE — LETTER
4/19/2018    [facility]  [address]  [facility] [address]    RE: Nilesh Dos Santos       Dear Colleague,    I had the pleasure of seeing Nilesh Dos Santos in the [facility].    HPI and Plan:   See dictation(#[id number])    Orders Placed This Encounter   Procedures     Basic metabolic panel     Lipid Profile     ALT     Basic metabolic panel     Follow-Up with Cardiologist       No orders of the defined types were placed in this encounter.      There are no discontinued medications.      Encounter Diagnosis   Name Primary?     Coronary artery disease involving native heart, angina presence unspecified, unspecified vessel or lesion type        CURRENT MEDICATIONS:  Current Outpatient Prescriptions   Medication Sig Dispense Refill     aspirin 81 MG tablet Take by mouth daily       benzonatate (TESSALON) 200 MG capsule Take 1 capsule (200 mg) by mouth 3 times daily as needed for cough 21 capsule 0     coenzyme Q-10 capsule Take 1 capsule by mouth daily 90 capsule 3     COMPRESSION STOCKINGS 1 each daily 2 each 4     fluticasone (FLONASE) 50 MCG/ACT spray Spray 1-2 sprays into both nostrils daily 3 Bottle 1     guaiFENesin (MUCINEX) 600 MG 12 hr tablet Take 2 tablets (1,200 mg) by mouth 2 times daily as needed for congestion 40 tablet 0     hydrochlorothiazide (HYDRODIURIL) 25 MG tablet Take 1 tablet (25 mg) by mouth daily 90 tablet 3     montelukast (SINGULAIR) 10 MG tablet Take 1 tablet (10 mg) by mouth At Bedtime 30 tablet 0     naproxen sodium (ALEVE) 220 MG tablet Take 220 mg by mouth as needed for moderate pain       nitroglycerin (NITROSTAT) 0.4 MG SL tablet Place under the tongue every 5 minutes as needed       rosuvastatin (CRESTOR) 40 MG tablet Take 1 tablet (40 mg) by mouth daily 90 tablet 0       ALLERGIES     Allergies   Allergen Reactions     Amoxicillin      severe rash     Contrast Dye Hives     Patient states this was years ago and he believes  "he has had dye since that time without problems.       PAST MEDICAL HISTORY:  Past Medical History:   Diagnosis Date     CAD (coronary artery disease)     diffuse CAD      Chest pain     chronic stable     Chronic stable angina (H)      Contact dermatitis and other eczema, due to unspecified cause      DEPRESSIVE DISORDER NEC      Dyslipidemia      Fam hx-cardiovas dis NEC     Mother and Father     HTN (hypertension)      IRRITABLE COLON      Mild aortic stenosis      Personal history of urinary calculi      Skin cancer, basal cell 2008     SOB (shortness of breath)      Varicose veins        PAST SURGICAL HISTORY:  Past Surgical History:   Procedure Laterality Date     ANGIOGRAM  2015    Med Tx, no flow limiting lesions     C URETER ENDOSCOPY THRU URETEROSTOMY REMV FB OR CALCULUS      dr de la garza     EYE EXAM ESTABLISHED PT  2009     HC COLONOSCOPY THRU STOMA, DIAGNOSTIC       HC EXCISE VARICOCELE      \"cautery\" through intra venous approach     HC KNEE SCOPE, DIAGNOSTIC      Arthroscopy, Knee/left knee      HC REPAIR ING HERNIA,5+Y/O,REDUCIBL      Inguinal Hernia Repair  Right     HC VASECTOMY UNILAT/BILAT W POSTOP SEMEN      Vasectomy     TEST NOT FOUND      Per cut removal of L kidney stone       FAMILY HISTORY:  Family History   Problem Relation Age of Onset     HEART DISEASE Mother      91     Cancer - colorectal Mother      HEART DISEASE Father       70s     DIABETES Maternal Aunt      Alzheimer Disease No family hx of      CANCER No family hx of      Prostate Cancer No family hx of        SOCIAL HISTORY:  Social History     Social History     Marital status:      Spouse name: Velia     Number of children: 2     Years of education: 18     Occupational History     Consultant Self     Social History Main Topics     Smoking status: Former Smoker     Types: Cigars     Quit date: 1975     Smokeless tobacco: Never Used      Comment: 2-3 cigars     Alcohol use 2.0 oz/week     " "4 Glasses of wine per week      Comment: 1-2 glass of wine daily     Drug use: No     Sexual activity: Yes     Partners: Female     Birth control/ protection: Surgical      Comment: vas     Other Topics Concern      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, , [employer]      Blood Transfusions No     Caffeine Concern Yes     2 cups per day     Occupational Exposure No     Hobby Hazards No     Sleep Concern No     Stress Concern No     Weight Concern No     Special Diet No     Exercise No     Seat Belt Yes     Self-Exams No     Social History Narrative       Review of Systems:  Skin:  Negative       Eyes:  Positive for glasses dry & scratchy   ENT:  Positive for sinus trouble;postnasal drainage;nasal congestion    Respiratory:  Positive for cough;wheezing     Cardiovascular:    fatigue;Positive for    Gastroenterology: Negative      Genitourinary:  Negative      Musculoskeletal:  Positive for arthritis    Neurologic:  Positive for numbness or tingling of feet numbness in toes   Psychiatric:  Positive for depression    Heme/Lymph/Imm:  Positive for easy bruising    Endocrine:  Negative        Physical Exam:  Vitals: /82  Pulse 65  Ht 1.803 m (5' 11\")  Wt 88.5 kg (195 lb)  BMI 27.2 kg/m2    Constitutional:           Skin:             Head:           Eyes:           Lymph:      ENT:           Neck:           Respiratory:            Cardiac:                                                           GI:           Extremities and Muscular Skeletal:                 Neurological:           Psych:             CC  Bharat Mata MD  [address]  [place] [address]                    Thank you for allowing me to participate in the care of your patient.      Sincerely,     Bharat Mata MD     [facility]    cc:   Bharat Mata MD  [address]  [place] [address]        "

## 2018-04-19 NOTE — LETTER
4/19/2018      [facility]  [address]  [facility] [address]      RE: Nilesh Hulle       Dear Colleague,    I had the pleasure of seeing Nilesh Dos Santos in the [facility].    Service Date: 04/19/2018      REASON FOR CLINIC VISIT:  Followup CAD.      HISTORY OF PRESENT ILLNESS:  Mr. Dos Santos is a very pleasant 73-year-old gentleman with history of chronic stable angina in past, CAD with coronary CT angiogram in 2013 showing diffuse coronary artery calcification/disease but no significant obstructive disease.  However, due to exertional chest discomfort, the patient underwent coronary angiogram in 2015 when he was found to have moderate stenosis involving the right posterior descending and posterolateral branch with FFR of 0.9 and 0.94 respectively, indicating they were not flow limiting.  Today, patient is coming for routine followup accompanied by his wife.  Overall, cardiac status-wise, the patient is doing quite well.  He is no longer experiencing any chest discomfort or shortness of breath with physical activity.  He did shovel snow intermittently this season.  He is on Crestor, baby aspirin, hydrochlorothiazide.  This season, he has struggled with colds and is currently recovering from a cold for the last 2 weeks and is taking antitussive medications.  Lipid panel today shows LDL well controlled at 58, triglycerides 128.  Blood pressure is reasonably controlled.  Remarkably, the patient had BMP checked through his primary care physician's office last summer that showed potassium of 2.9.  I do not see followup labs.  He is not on any potassium supplementation at present.  I have asked the lab to add on BMP today from lipid panel labs drawn earlier today.  Echocardiogram last year showed normal LV function with borderline ascending aorta dilatation.  He does not use any tobacco.      PHYSICAL EXAMINATION:   VITAL SIGNS:  Blood pressure 138/82,  heart rate 65 and regular, weight 195 pounds, BMI 27.25.   GENERAL:  The patient appears pleasant, comfortable.   NECK:  Normal JVP, no bruit.   CARDIOVASCULAR SYSTEM:  S1, S2 normal, no murmur, rub or gallop.   RESPIRATORY SYSTEM:  Clear to auscultation bilaterally.    GASTROINTESTINAL SYSTEM:  Abdomen soft, nontender.   EXTREMITIES:  No pitting pedal edema.    NEUROLOGICAL:  Alert, oriented x3.   PSYCHIATRIC:  Normal affect.   SKIN:  No obvious rash.   HEENT:  No pallor or icterus.      IMPRESSION AND PLAN:  A very pleasant 73-year-old gentleman with history of CAD.  In the past, he had some exertional chest discomfort consistent with stable angina.  Fortunately, he is no longer having any exertional chest discomfort.  As noted above, coronary CT and *** subsequently have shown moderate coronary artery disease.  LDL is well controlled.  Blood pressure is reasonably controlled.  In the past, he was on beta blocker but that had to be discontinued because of fatigue and poor sleep.  He was also on Imdur in the past but that had to be also discontinued because of side effect.  At present, he appears stable from cardiac standpoint of view and if he continues to feel well, we can see him back in 1 year, sooner if he notices any exertional symptoms.  As noted above, I recommended adding BMP to the labs drawn today and my office is going to update him with the results of the BMP.         SHAUNA BLOCK MD             D: 2018   T: 2018   MT: RANDEE      Name:     MIRA MACHADO   MRN:      [id number]        Account:      [id number]   :      1945           Service Date: 2018      Document: [id number]         Outpatient Encounter Prescriptions as of 2018   Medication Sig Dispense Refill     aspirin 81 MG tablet Take by mouth daily       benzonatate (TESSALON) 200 MG capsule Take 1 capsule (200 mg) by mouth 3 times daily as needed for cough 21 capsule 0     coenzyme Q-10 capsule Take 1 capsule by  mouth daily 90 capsule 3     COMPRESSION STOCKINGS 1 each daily 2 each 4     fluticasone (FLONASE) 50 MCG/ACT spray Spray 1-2 sprays into both nostrils daily 3 Bottle 1     guaiFENesin (MUCINEX) 600 MG 12 hr tablet Take 2 tablets (1,200 mg) by mouth 2 times daily as needed for congestion 40 tablet 0     hydrochlorothiazide (HYDRODIURIL) 25 MG tablet Take 1 tablet (25 mg) by mouth daily 90 tablet 3     montelukast (SINGULAIR) 10 MG tablet Take 1 tablet (10 mg) by mouth At Bedtime 30 tablet 0     naproxen sodium (ALEVE) 220 MG tablet Take 220 mg by mouth as needed for moderate pain       nitroglycerin (NITROSTAT) 0.4 MG SL tablet Place under the tongue every 5 minutes as needed       rosuvastatin (CRESTOR) 40 MG tablet Take 1 tablet (40 mg) by mouth daily 90 tablet 0     [DISCONTINUED] benzonatate (TESSALON) 200 MG capsule Take 1 capsule (200 mg) by mouth 3 times daily as needed for cough 21 capsule 0     [DISCONTINUED] montelukast (SINGULAIR) 10 MG tablet Take 1 tablet (10 mg) by mouth At Bedtime 30 tablet 0     No facility-administered encounter medications on file as of 4/19/2018.        Again, thank you for allowing me to participate in the care of your patient.      Sincerely,    Bharat Mata MD     [facility]

## 2018-04-19 NOTE — LETTER
4/19/2018      [facility]  [address]  [facility] [address]      RE: Nilesh Hulle       Dear Colleague,    I had the pleasure of seeing Nilesh Dos Santos in the [facility].    Service Date: 04/19/2018      REASON FOR CLINIC VISIT:  Followup CAD.      HISTORY OF PRESENT ILLNESS:  Mr. Dos Santos is a very pleasant 73-year-old gentleman with history of chronic stable angina in past, CAD with coronary CT angiogram in 2013 showing diffuse coronary artery calcification/disease but no significant obstructive disease.  However, due to exertional chest discomfort, the patient underwent coronary angiogram in 2015 when he was found to have moderate stenosis involving the right posterior descending and posterolateral branch with FFR of 0.9 and 0.94 respectively, indicating they were not flow limiting.  Today, patient is coming for routine followup accompanied by his wife.  Overall, cardiac status-wise, the patient is doing quite well.  He is no longer experiencing any chest discomfort or shortness of breath with physical activity.  He did shovel snow intermittently this season.  He is on Crestor, baby aspirin, hydrochlorothiazide.  This season, he has struggled with colds and is currently recovering from a cold for the last 2 weeks and is taking antitussive medications.  Lipid panel today shows LDL well controlled at 58, triglycerides 128.  Blood pressure is reasonably controlled.  Remarkably, the patient had BMP checked through his primary care physician's office last summer that showed potassium of 2.9.  I do not see followup labs.  He is not on any potassium supplementation at present.  I have asked the lab to add on BMP today from lipid panel labs drawn earlier today.  Echocardiogram last year showed normal LV function with borderline ascending aorta dilatation.  He does not use any tobacco.      PHYSICAL EXAMINATION:   VITAL SIGNS:  Blood pressure 138/82,  heart rate 65 and regular, weight 195 pounds, BMI 27.25.   GENERAL:  The patient appears pleasant, comfortable.   NECK:  Normal JVP, no bruit.   CARDIOVASCULAR SYSTEM:  S1, S2 normal, no murmur, rub or gallop.   RESPIRATORY SYSTEM:  Clear to auscultation bilaterally.    GASTROINTESTINAL SYSTEM:  Abdomen soft, nontender.   EXTREMITIES:  No pitting pedal edema.    NEUROLOGICAL:  Alert, oriented x3.   PSYCHIATRIC:  Normal affect.   SKIN:  No obvious rash.   HEENT:  No pallor or icterus.      IMPRESSION AND PLAN:  A very pleasant 73-year-old gentleman with history of CAD.  In the past, he had some exertional chest discomfort consistent with stable angina.  Fortunately, he is no longer having any exertional chest discomfort.  As noted above, coronary CT and *** subsequently have shown moderate coronary artery disease.  LDL is well controlled.  Blood pressure is reasonably controlled.  In the past, he was on beta blocker but that had to be discontinued because of fatigue and poor sleep.  He was also on Imdur in the past but that had to be also discontinued because of side effect.  At present, he appears stable from cardiac standpoint of view and if he continues to feel well, we can see him back in 1 year, sooner if he notices any exertional symptoms.  As noted above, I recommended adding BMP to the labs drawn today and my office is going to update him with the results of the BMP.         SHAUNA BLOCK MD             D: 2018   T: 2018   MT: RANDEE      Name:     MIRA MACHADO   MRN:      [id number]        Account:      [id number]   :      1945           Service Date: 2018      Document: [id number]         Outpatient Encounter Prescriptions as of 2018   Medication Sig Dispense Refill     aspirin 81 MG tablet Take by mouth daily       benzonatate (TESSALON) 200 MG capsule Take 1 capsule (200 mg) by mouth 3 times daily as needed for cough 21 capsule 0     coenzyme Q-10 capsule Take 1 capsule by  mouth daily 90 capsule 3     COMPRESSION STOCKINGS 1 each daily 2 each 4     fluticasone (FLONASE) 50 MCG/ACT spray Spray 1-2 sprays into both nostrils daily 3 Bottle 1     guaiFENesin (MUCINEX) 600 MG 12 hr tablet Take 2 tablets (1,200 mg) by mouth 2 times daily as needed for congestion 40 tablet 0     hydrochlorothiazide (HYDRODIURIL) 25 MG tablet Take 1 tablet (25 mg) by mouth daily 90 tablet 3     montelukast (SINGULAIR) 10 MG tablet Take 1 tablet (10 mg) by mouth At Bedtime 30 tablet 0     naproxen sodium (ALEVE) 220 MG tablet Take 220 mg by mouth as needed for moderate pain       nitroglycerin (NITROSTAT) 0.4 MG SL tablet Place under the tongue every 5 minutes as needed       rosuvastatin (CRESTOR) 40 MG tablet Take 1 tablet (40 mg) by mouth daily 90 tablet 0     [DISCONTINUED] benzonatate (TESSALON) 200 MG capsule Take 1 capsule (200 mg) by mouth 3 times daily as needed for cough 21 capsule 0     [DISCONTINUED] montelukast (SINGULAIR) 10 MG tablet Take 1 tablet (10 mg) by mouth At Bedtime 30 tablet 0     No facility-administered encounter medications on file as of 4/19/2018.        Again, thank you for allowing me to participate in the care of your patient.      Sincerely,    Bharat Mata MD     [facility]

## 2018-04-19 NOTE — MR AVS SNAPSHOT
After Visit Summary   4/19/2018    Nilesh Dos Santos    MRN: [id number]           Patient Information     Date Of Birth          1945        Visit Information        Provider Department      4/19/2018 10:15 AM Bharat Mata MD [facility]        Today's Diagnoses     Coronary artery disease involving native heart, angina presence unspecified, unspecified vessel or lesion type           Follow-ups after your visit        Additional Services     Follow-Up with Cardiologist                 Future tests that were ordered for you today     Open Future Orders        Priority Expected Expires Ordered    Lipid Profile Routine 4/19/2019 4/19/2019 4/19/2018    ALT Routine 4/19/2019 4/19/2019 4/19/2018    Basic metabolic panel Routine 4/19/2019 4/20/2019 4/19/2018    Follow-Up with Cardiologist Routine 4/19/2019 4/20/2019 4/19/2018            Who to contact     If you have questions or need follow up information about today's clinic visit or your schedule please contact [facility] directly at [phone].  Normal or non-critical lab and imaging results will be communicated to you by [employer], letter or phone within 4 business days after the clinic has received the results. If you do not hear from us within 7 days, please contact the clinic through [employer] or phone. If you have a critical or abnormal lab result, we will notify you by phone as soon as possible.  Submit refill requests through [employer] or call your pharmacy and they will forward the refill request to us. Please allow 3 business days for your refill to be completed.          Additional Information About Your Visit        MyChart Information     [employer] gives you secure access to your electronic health record. If you see a primary care provider, you can also send messages to your care team and make appointments. If you have questions, please call your primary care clinic.   "If you do not have a primary care provider, please call [phone] and they will assist you.        Care EveryWhere ID     This is your Care EveryWhere ID. This could be used by other organizations to access your [place] medical records  [id number]        Your Vitals Were     Pulse Height BMI (Body Mass Index)             65 1.803 m (5' 11\") 27.2 kg/m2          Blood Pressure from Last 3 Encounters:   04/19/18 138/82   04/11/18 112/72   02/14/18 142/70    Weight from Last 3 Encounters:   04/19/18 88.5 kg (195 lb)   04/11/18 89.6 kg (197 lb 9.6 oz)   02/14/18 88.9 kg (196 lb)              We Performed the Following     Follow-Up with Cardiologist        Primary Care Provider Fax #    [facility] [phone] [address] [facility] [address]  [facility] [address]        Equal Access to Services     MAURIZIO PENA : Hadii damián ku hadasho Soomaali, waaxda luqadaha, qaybta kaalmada adeegyada, wali ross . So [facility] [phone].    ATENCIÓN: Si habla español, tiene a abbasi disposición servicios gratuitos de asistencia lingüística. Llame al [phone].    We comply with applicable federal civil rights laws and [place] laws. We do not discriminate on the basis of race, color, national origin, age, disability, sex, sexual orientation, or gender identity.            Thank you!     Thank you for choosing [facility]  for your care. Our goal is always to provide you with excellent care. Hearing back from our patients is one way we can continue to improve our services. Please take a few minutes to complete the written survey that you may receive in the mail after your visit with us. Thank you!             Your Updated Medication List - Protect others around you: Learn how to safely use, store and throw away your medicines at www.disposemymeds.org.          This list is accurate as of 4/19/18 10:42 AM.  Always use your most recent med " list.                   Brand Name Dispense Instructions for use Diagnosis    ALEVE 220 MG tablet   Generic drug:  naproxen sodium      Take 220 mg by mouth as needed for moderate pain        aspirin 81 MG tablet      Take by mouth daily        benzonatate 200 MG capsule    TESSALON    21 capsule    Take 1 capsule (200 mg) by mouth 3 times daily as needed for cough    Cough       coenzyme Q-10 capsule     90 capsule    Take 1 capsule by mouth daily    Mixed hyperlipidemia       COMPRESSION STOCKINGS     2 each    1 each daily    Varicose veins of lower extremities with other complications       fluticasone 50 MCG/ACT spray    FLONASE    3 Bottle    Spray 1-2 sprays into both nostrils daily    Chronic vasomotor rhinitis       guaiFENesin 600 MG 12 hr tablet    MUCINEX    40 tablet    Take 2 tablets (1,200 mg) by mouth 2 times daily as needed for congestion    Viral upper respiratory tract infection       hydrochlorothiazide 25 MG tablet    HYDRODIURIL    90 tablet    Take 1 tablet (25 mg) by mouth daily    Essential hypertension, benign       montelukast 10 MG tablet    SINGULAIR    30 tablet    Take 1 tablet (10 mg) by mouth At Bedtime    Cough       NITROSTAT 0.4 MG sublingual tablet   Generic drug:  nitroGLYcerin      Place under the tongue every 5 minutes as needed        rosuvastatin 40 MG tablet    CRESTOR    90 tablet    Take 1 tablet (40 mg) by mouth daily    Mixed hyperlipidemia

## 2018-04-19 NOTE — PROGRESS NOTES
HPI and Plan:   See dictation(#[id number])    Orders Placed This Encounter   Procedures     Basic metabolic panel     Lipid Profile     ALT     Basic metabolic panel     Follow-Up with Cardiologist       No orders of the defined types were placed in this encounter.      There are no discontinued medications.      Encounter Diagnosis   Name Primary?     Coronary artery disease involving native heart, angina presence unspecified, unspecified vessel or lesion type        CURRENT MEDICATIONS:  Current Outpatient Prescriptions   Medication Sig Dispense Refill     aspirin 81 MG tablet Take by mouth daily       benzonatate (TESSALON) 200 MG capsule Take 1 capsule (200 mg) by mouth 3 times daily as needed for cough 21 capsule 0     coenzyme Q-10 capsule Take 1 capsule by mouth daily 90 capsule 3     COMPRESSION STOCKINGS 1 each daily 2 each 4     fluticasone (FLONASE) 50 MCG/ACT spray Spray 1-2 sprays into both nostrils daily 3 Bottle 1     guaiFENesin (MUCINEX) 600 MG 12 hr tablet Take 2 tablets (1,200 mg) by mouth 2 times daily as needed for congestion 40 tablet 0     hydrochlorothiazide (HYDRODIURIL) 25 MG tablet Take 1 tablet (25 mg) by mouth daily 90 tablet 3     montelukast (SINGULAIR) 10 MG tablet Take 1 tablet (10 mg) by mouth At Bedtime 30 tablet 0     naproxen sodium (ALEVE) 220 MG tablet Take 220 mg by mouth as needed for moderate pain       nitroglycerin (NITROSTAT) 0.4 MG SL tablet Place under the tongue every 5 minutes as needed       rosuvastatin (CRESTOR) 40 MG tablet Take 1 tablet (40 mg) by mouth daily 90 tablet 0       ALLERGIES     Allergies   Allergen Reactions     Amoxicillin      severe rash     Contrast Dye Hives     Patient states this was years ago and he believes he has had dye since that time without problems.       PAST MEDICAL HISTORY:  Past Medical History:   Diagnosis Date     CAD (coronary artery disease)     diffuse CAD      Chest pain     chronic stable     Chronic stable angina (H)       "Contact dermatitis and other eczema, due to unspecified cause      DEPRESSIVE DISORDER NEC      Dyslipidemia      Fam hx-cardiovas dis NEC     Mother and Father     HTN (hypertension)      IRRITABLE COLON      Mild aortic stenosis      Personal history of urinary calculi      Skin cancer, basal cell 2008     SOB (shortness of breath)      Varicose veins        PAST SURGICAL HISTORY:  Past Surgical History:   Procedure Laterality Date     ANGIOGRAM  2015    Med Tx, no flow limiting lesions     C URETER ENDOSCOPY THRU URETEROSTOMY REMV FB OR CALCULUS      dr de la garza     EYE EXAM ESTABLISHED PT  2009     HC COLONOSCOPY THRU STOMA, DIAGNOSTIC       HC EXCISE VARICOCELE      \"cautery\" through intra venous approach     HC KNEE SCOPE, DIAGNOSTIC      Arthroscopy, Knee/left knee      HC REPAIR ING HERNIA,5+Y/O,REDUCIBL      Inguinal Hernia Repair  Right     HC VASECTOMY UNILAT/BILAT W POSTOP SEMEN      Vasectomy     TEST NOT FOUND      Per cut removal of L kidney stone       FAMILY HISTORY:  Family History   Problem Relation Age of Onset     HEART DISEASE Mother      91     Cancer - colorectal Mother      HEART DISEASE Father       70s     DIABETES Maternal Aunt      Alzheimer Disease No family hx of      CANCER No family hx of      Prostate Cancer No family hx of        SOCIAL HISTORY:  Social History     Social History     Marital status:      Spouse name: Velia     Number of children: 2     Years of education: 18     Occupational History     Consultant Self     Social History Main Topics     Smoking status: Former Smoker     Types: Cigars     Quit date: 1975     Smokeless tobacco: Never Used      Comment: 2-3 cigars     Alcohol use 2.0 oz/week     4 Glasses of wine per week      Comment: 1-2 glass of wine daily     Drug use: No     Sexual activity: Yes     Partners: Female     Birth control/ protection: Surgical      Comment: vas     Other Topics Concern      Service Yes " "     air[employer], , [employer]      Blood Transfusions No     Caffeine Concern Yes     2 cups per day     Occupational Exposure No     Hobby Hazards No     Sleep Concern No     Stress Concern No     Weight Concern No     Special Diet No     Exercise No     Seat Belt Yes     Self-Exams No     Social History Narrative       Review of Systems:  Skin:  Negative       Eyes:  Positive for glasses dry & scratchy   ENT:  Positive for sinus trouble;postnasal drainage;nasal congestion    Respiratory:  Positive for cough;wheezing     Cardiovascular:    fatigue;Positive for    Gastroenterology: Negative      Genitourinary:  Negative      Musculoskeletal:  Positive for arthritis    Neurologic:  Positive for numbness or tingling of feet numbness in toes   Psychiatric:  Positive for depression    Heme/Lymph/Imm:  Positive for easy bruising    Endocrine:  Negative        Physical Exam:  Vitals: /82  Pulse 65  Ht 1.803 m (5' 11\")  Wt 88.5 kg (195 lb)  BMI 27.2 kg/m2    Constitutional:           Skin:             Head:           Eyes:           Lymph:      ENT:           Neck:           Respiratory:            Cardiac:                                                           GI:           Extremities and Muscular Skeletal:                 Neurological:           Psych:             CC  Bharat Mata MD  [id number]  [place] [id number]                  "

## 2018-04-19 NOTE — PROGRESS NOTES
Service Date: 04/19/2018      REASON FOR CLINIC VISIT:  Followup CAD.      HISTORY OF PRESENT ILLNESS:  Mr. Dos Santos is a very pleasant 73-year-old gentleman with history of chronic stable angina in past, CAD with coronary CT angiogram in 2013 showing diffuse coronary artery calcification/disease but no significant obstructive disease.  However, due to exertional chest discomfort, the patient underwent coronary angiogram in 2015 when he was found to have moderate stenosis involving the right posterior descending and posterolateral branch with FFR of 0.9 and 0.94 respectively, indicating they were not flow limiting.  Today, patient is coming for routine followup accompanied by his wife.  Overall, cardiac status-wise, the patient is doing quite well.  He is no longer experiencing any chest discomfort or shortness of breath with physical activity.  He did shovel snow intermittently this season.  He is on Crestor, baby aspirin, hydrochlorothiazide.  This season, he has struggled with colds and is currently recovering from a cold for the last 2 weeks and is taking antitussive medications.  Lipid panel today shows LDL well controlled at 58, triglycerides 128.  Blood pressure is reasonably controlled.  Remarkably, the patient had BMP checked through his primary care physician's office last summer that showed potassium of 2.9.  I do not see followup labs.  He is not on any potassium supplementation at present.  I have asked the lab to add on BMP today from lipid panel labs drawn earlier today.  Echocardiogram last year showed normal LV function with borderline ascending aorta dilatation.  He does not use any tobacco.      PHYSICAL EXAMINATION:   VITAL SIGNS:  Blood pressure 138/82, heart rate 65 and regular, weight 195 pounds, BMI 27.25.   GENERAL:  The patient appears pleasant, comfortable.   NECK:  Normal JVP, no bruit.   CARDIOVASCULAR SYSTEM:  S1, S2 normal, no murmur, rub or gallop.   RESPIRATORY SYSTEM:  Clear to  auscultation bilaterally.    GASTROINTESTINAL SYSTEM:  Abdomen soft, nontender.   EXTREMITIES:  No pitting pedal edema.    NEUROLOGICAL:  Alert, oriented x3.   PSYCHIATRIC:  Normal affect.   SKIN:  No obvious rash.   HEENT:  No pallor or icterus.      IMPRESSION AND PLAN:  A very pleasant 73-year-old gentleman with history of CAD.  In the past, he had some exertional chest discomfort consistent with stable angina.  Fortunately, he is no longer having any exertional chest discomfort.  As noted above, coronary CT angiogram and subsequently coronary angiogram have shown moderate coronary artery disease.  LDL is well controlled.  Blood pressure is reasonably controlled.  In the past, he was on beta blocker but that had to be discontinued because of fatigue and poor sleep.  He was also on Imdur in the past but that had to be also discontinued because of side effect.  At present, he appears stable from cardiac standpoint of view and if he continues to feel well, we can see him back in 1 year, sooner if he notices any exertional symptoms.  As noted above, I recommended adding BMP to the labs drawn today and my office is going to update him with the results of the BMP.         SHAUNA BLOCK MD             D: 2018   T: 2018   MT: RANDEE      Name:     MIRA MCAHADO   MRN:      [id number]        Account:      [id number]   :      1945           Service Date: 2018      Document: [id number]

## 2018-04-19 NOTE — LETTER
4/19/2018      [facility]  [address]  [facility] [address]      RE: Nilesh Hulle       Dear Colleague,    I had the pleasure of seeing Nilesh Dos Santos in the [facility].    Service Date: 04/19/2018      REASON FOR CLINIC VISIT:  Followup CAD.      HISTORY OF PRESENT ILLNESS:  Mr. Dos Santos is a very pleasant 73-year-old gentleman with history of chronic stable angina in past, CAD with coronary CT angiogram in 2013 showing diffuse coronary artery calcification/disease but no significant obstructive disease.  However, due to exertional chest discomfort, the patient underwent coronary angiogram in 2015 when he was found to have moderate stenosis involving the right posterior descending and posterolateral branch with FFR of 0.9 and 0.94 respectively, indicating they were not flow limiting.  Today, patient is coming for routine followup accompanied by his wife.  Overall, cardiac status-wise, the patient is doing quite well.  He is no longer experiencing any chest discomfort or shortness of breath with physical activity.  He did shovel snow intermittently this season.  He is on Crestor, baby aspirin, hydrochlorothiazide.  This season, he has struggled with colds and is currently recovering from a cold for the last 2 weeks and is taking antitussive medications.  Lipid panel today shows LDL well controlled at 58, triglycerides 128.  Blood pressure is reasonably controlled.  Remarkably, the patient had BMP checked through his primary care physician's office last summer that showed potassium of 2.9.  I do not see followup labs.  He is not on any potassium supplementation at present.  I have asked the lab to add on BMP today from lipid panel labs drawn earlier today.  Echocardiogram last year showed normal LV function with borderline ascending aorta dilatation.  He does not use any tobacco.      PHYSICAL EXAMINATION:   VITAL SIGNS:  Blood pressure 138/82,  heart rate 65 and regular, weight 195 pounds, BMI 27.25.   GENERAL:  The patient appears pleasant, comfortable.   NECK:  Normal JVP, no bruit.   CARDIOVASCULAR SYSTEM:  S1, S2 normal, no murmur, rub or gallop.   RESPIRATORY SYSTEM:  Clear to auscultation bilaterally.    GASTROINTESTINAL SYSTEM:  Abdomen soft, nontender.   EXTREMITIES:  No pitting pedal edema.    NEUROLOGICAL:  Alert, oriented x3.   PSYCHIATRIC:  Normal affect.   SKIN:  No obvious rash.   HEENT:  No pallor or icterus.      IMPRESSION AND PLAN:  A very pleasant 73-year-old gentleman with history of CAD.  In the past, he had some exertional chest discomfort consistent with stable angina.  Fortunately, he is no longer having any exertional chest discomfort.  As noted above, coronary CT and *** subsequently have shown moderate coronary artery disease.  LDL is well controlled.  Blood pressure is reasonably controlled.  In the past, he was on beta blocker but that had to be discontinued because of fatigue and poor sleep.  He was also on Imdur in the past but that had to be also discontinued because of side effect.  At present, he appears stable from cardiac standpoint of view and if he continues to feel well, we can see him back in 1 year, sooner if he notices any exertional symptoms.  As noted above, I recommended adding BMP to the labs drawn today and my office is going to update him with the results of the BMP.         SHAUNA BLOCK MD             D: 2018   T: 2018   MT: RANDEE      Name:     MIRA MACHADO   MRN:      [id number]        Account:      [id number]   :      1945           Service Date: 2018      Document: [id number]         Outpatient Encounter Prescriptions as of 2018   Medication Sig Dispense Refill     aspirin 81 MG tablet Take by mouth daily       benzonatate (TESSALON) 200 MG capsule Take 1 capsule (200 mg) by mouth 3 times daily as needed for cough 21 capsule 0     coenzyme Q-10 capsule Take 1 capsule by  mouth daily 90 capsule 3     COMPRESSION STOCKINGS 1 each daily 2 each 4     fluticasone (FLONASE) 50 MCG/ACT spray Spray 1-2 sprays into both nostrils daily 3 Bottle 1     guaiFENesin (MUCINEX) 600 MG 12 hr tablet Take 2 tablets (1,200 mg) by mouth 2 times daily as needed for congestion 40 tablet 0     hydrochlorothiazide (HYDRODIURIL) 25 MG tablet Take 1 tablet (25 mg) by mouth daily 90 tablet 3     montelukast (SINGULAIR) 10 MG tablet Take 1 tablet (10 mg) by mouth At Bedtime 30 tablet 0     naproxen sodium (ALEVE) 220 MG tablet Take 220 mg by mouth as needed for moderate pain       nitroglycerin (NITROSTAT) 0.4 MG SL tablet Place under the tongue every 5 minutes as needed       rosuvastatin (CRESTOR) 40 MG tablet Take 1 tablet (40 mg) by mouth daily 90 tablet 0     [DISCONTINUED] benzonatate (TESSALON) 200 MG capsule Take 1 capsule (200 mg) by mouth 3 times daily as needed for cough 21 capsule 0     [DISCONTINUED] montelukast (SINGULAIR) 10 MG tablet Take 1 tablet (10 mg) by mouth At Bedtime 30 tablet 0     No facility-administered encounter medications on file as of 4/19/2018.        Again, thank you for allowing me to participate in the care of your patient.      Sincerely,    Bharat Mata MD     [facility]

## 2018-04-19 NOTE — TELEPHONE ENCOUNTER
Patient had BMP today and critical value 2.9 Dr. Mata would like 20meq K- RX sent to [facility] daily with a re check BMP and magnesium in 1 week.  Patient will have labs in [facility] next week.  Verbalizes understanding

## 2018-04-19 NOTE — LETTER
4/19/2018      [facility]  [address]  [facility] [address]      RE: Nilesh Hulle       Dear Colleague,    I had the pleasure of seeing Nilesh Dos Santos in the [facility].    Service Date: 04/19/2018      REASON FOR CLINIC VISIT:  Followup CAD.      HISTORY OF PRESENT ILLNESS:  Mr. Dos Santos is a very pleasant 73-year-old gentleman with history of chronic stable angina in past, CAD with coronary CT angiogram in 2013 showing diffuse coronary artery calcification/disease but no significant obstructive disease.  However, due to exertional chest discomfort, the patient underwent coronary angiogram in 2015 when he was found to have moderate stenosis involving the right posterior descending and posterolateral branch with FFR of 0.9 and 0.94 respectively, indicating they were not flow limiting.  Today, patient is coming for routine followup accompanied by his wife.  Overall, cardiac status-wise, the patient is doing quite well.  He is no longer experiencing any chest discomfort or shortness of breath with physical activity.  He did shovel snow intermittently this season.  He is on Crestor, baby aspirin, hydrochlorothiazide.  This season, he has struggled with colds and is currently recovering from a cold for the last 2 weeks and is taking antitussive medications.  Lipid panel today shows LDL well controlled at 58, triglycerides 128.  Blood pressure is reasonably controlled.  Remarkably, the patient had BMP checked through his primary care physician's office last summer that showed potassium of 2.9.  I do not see followup labs.  He is not on any potassium supplementation at present.  I have asked the lab to add on BMP today from lipid panel labs drawn earlier today.  Echocardiogram last year showed normal LV function with borderline ascending aorta dilatation.  He does not use any tobacco.      PHYSICAL EXAMINATION:   VITAL SIGNS:  Blood pressure 138/82,  heart rate 65 and regular, weight 195 pounds, BMI 27.25.   GENERAL:  The patient appears pleasant, comfortable.   NECK:  Normal JVP, no bruit.   CARDIOVASCULAR SYSTEM:  S1, S2 normal, no murmur, rub or gallop.   RESPIRATORY SYSTEM:  Clear to auscultation bilaterally.    GASTROINTESTINAL SYSTEM:  Abdomen soft, nontender.   EXTREMITIES:  No pitting pedal edema.    NEUROLOGICAL:  Alert, oriented x3.   PSYCHIATRIC:  Normal affect.   SKIN:  No obvious rash.   HEENT:  No pallor or icterus.      IMPRESSION AND PLAN:  A very pleasant 73-year-old gentleman with history of CAD.  In the past, he had some exertional chest discomfort consistent with stable angina.  Fortunately, he is no longer having any exertional chest discomfort.  As noted above, coronary CT and *** subsequently have shown moderate coronary artery disease.  LDL is well controlled.  Blood pressure is reasonably controlled.  In the past, he was on beta blocker but that had to be discontinued because of fatigue and poor sleep.  He was also on Imdur in the past but that had to be also discontinued because of side effect.  At present, he appears stable from cardiac standpoint of view and if he continues to feel well, we can see him back in 1 year, sooner if he notices any exertional symptoms.  As noted above, I recommended adding BMP to the labs drawn today and my office is going to update him with the results of the BMP.         SHAUNA BLOCK MD             D: 2018   T: 2018   MT: RANDEE      Name:     MIRA MACHADO   MRN:      [id number]        Account:      [id number]   :      1945           Service Date: 2018      Document: [id number]         Outpatient Encounter Prescriptions as of 2018   Medication Sig Dispense Refill     aspirin 81 MG tablet Take by mouth daily       benzonatate (TESSALON) 200 MG capsule Take 1 capsule (200 mg) by mouth 3 times daily as needed for cough 21 capsule 0     coenzyme Q-10 capsule Take 1 capsule by  mouth daily 90 capsule 3     COMPRESSION STOCKINGS 1 each daily 2 each 4     fluticasone (FLONASE) 50 MCG/ACT spray Spray 1-2 sprays into both nostrils daily 3 Bottle 1     guaiFENesin (MUCINEX) 600 MG 12 hr tablet Take 2 tablets (1,200 mg) by mouth 2 times daily as needed for congestion 40 tablet 0     hydrochlorothiazide (HYDRODIURIL) 25 MG tablet Take 1 tablet (25 mg) by mouth daily 90 tablet 3     montelukast (SINGULAIR) 10 MG tablet Take 1 tablet (10 mg) by mouth At Bedtime 30 tablet 0     naproxen sodium (ALEVE) 220 MG tablet Take 220 mg by mouth as needed for moderate pain       nitroglycerin (NITROSTAT) 0.4 MG SL tablet Place under the tongue every 5 minutes as needed       rosuvastatin (CRESTOR) 40 MG tablet Take 1 tablet (40 mg) by mouth daily 90 tablet 0     [DISCONTINUED] benzonatate (TESSALON) 200 MG capsule Take 1 capsule (200 mg) by mouth 3 times daily as needed for cough 21 capsule 0     [DISCONTINUED] montelukast (SINGULAIR) 10 MG tablet Take 1 tablet (10 mg) by mouth At Bedtime 30 tablet 0     No facility-administered encounter medications on file as of 4/19/2018.        Again, thank you for allowing me to participate in the care of your patient.      Sincerely,    Bharat Mata MD     [facility]

## 2018-04-20 ENCOUNTER — TELEPHONE (OUTPATIENT)
Dept: CARDIOLOGY | Facility: CLINIC | Age: 73
End: 2018-04-20

## 2018-04-20 NOTE — TELEPHONE ENCOUNTER
FYI magnesium results   Re ck BMP and magnesium in 1 week after adding K+ 20meq daily  Component      Latest Ref Rng & Units 4/19/2018   Magnesium      1.6 - 2.3 mg/dL 2.4 (H)

## 2018-04-30 DIAGNOSIS — I25.10 CAD (CORONARY ARTERY DISEASE): ICD-10-CM

## 2018-04-30 LAB
ANION GAP SERPL CALCULATED.3IONS-SCNC: 3 MMOL/L (ref 3–14)
BUN SERPL-MCNC: 19 MG/DL (ref 7–30)
CALCIUM SERPL-MCNC: 8.8 MG/DL (ref 8.5–10.1)
CHLORIDE SERPL-SCNC: 105 MMOL/L (ref 94–109)
CO2 SERPL-SCNC: 32 MMOL/L (ref 20–32)
CREAT SERPL-MCNC: 1.25 MG/DL (ref 0.66–1.25)
GFR SERPL CREATININE-BSD FRML MDRD: 57 ML/MIN/1.7M2
GLUCOSE SERPL-MCNC: 111 MG/DL (ref 70–99)
POTASSIUM SERPL-SCNC: 3.4 MMOL/L (ref 3.4–5.3)
SODIUM SERPL-SCNC: 140 MMOL/L (ref 133–144)

## 2018-04-30 PROCEDURE — 36415 COLL VENOUS BLD VENIPUNCTURE: CPT | Performed by: INTERNAL MEDICINE

## 2018-04-30 PROCEDURE — 80048 BASIC METABOLIC PNL TOTAL CA: CPT | Performed by: INTERNAL MEDICINE

## 2018-04-30 NOTE — TELEPHONE ENCOUNTER
Called patient to update him on lab results showing improvement in potassium. BMP ordered as a 1 week recheck after adding potassium 20 meq daily.  Will review with Dr. Mata and call if he has further recommendations, otherwise advised that Willie continue his current medications including the potassium supplement with follow up in 1 year as planned. He stated understanding. DHRUV Husain RN - 04/30/18, 11:31 AM    Component      Latest Ref Rng & Units 4/19/2018 4/30/2018   Sodium      133 - 144 mmol/L 140 140   Potassium      3.4 - 5.3 mmol/L 2.9 (LL) 3.4   Chloride      94 - 109 mmol/L 99 105   Carbon Dioxide      20 - 32 mmol/L 33 (H) 32   Anion Gap      3 - 14 mmol/L Not Calculated 3   Glucose      70 - 99 mg/dL 116 (H) 111 (H)   Urea Nitrogen      7 - 30 mg/dL 18 19   Creatinine      0.66 - 1.25 mg/dL 1.28 1.25   GFR Estimate      >60 mL/min/1.7m2 55 (L) 57 (L)   GFR Estimate If Black      >60 mL/min/1.7m2 67 68   Calcium      8.5 - 10.1 mg/dL 9.4 8.8

## 2018-05-04 ENCOUNTER — OFFICE VISIT (OUTPATIENT)
Dept: INTERNAL MEDICINE | Facility: CLINIC | Age: 73
End: 2018-05-04
Payer: COMMERCIAL

## 2018-05-04 VITALS
DIASTOLIC BLOOD PRESSURE: 80 MMHG | RESPIRATION RATE: 14 BRPM | HEART RATE: 82 BPM | WEIGHT: 192.6 LBS | TEMPERATURE: 98.2 F | SYSTOLIC BLOOD PRESSURE: 140 MMHG | BODY MASS INDEX: 26.96 KG/M2 | OXYGEN SATURATION: 97 % | HEIGHT: 71 IN

## 2018-05-04 DIAGNOSIS — R35.0 URINARY FREQUENCY: ICD-10-CM

## 2018-05-04 DIAGNOSIS — R73.9 BLOOD SUGAR INCREASED: ICD-10-CM

## 2018-05-04 DIAGNOSIS — Z12.11 SPECIAL SCREENING FOR MALIGNANT NEOPLASMS, COLON: ICD-10-CM

## 2018-05-04 DIAGNOSIS — R53.83 OTHER FATIGUE: ICD-10-CM

## 2018-05-04 DIAGNOSIS — I77.810 ASCENDING AORTA DILATATION (H): ICD-10-CM

## 2018-05-04 DIAGNOSIS — R05.3 CHRONIC COUGH: ICD-10-CM

## 2018-05-04 DIAGNOSIS — M79.10 MUSCULAR ACHES: ICD-10-CM

## 2018-05-04 DIAGNOSIS — G62.9 PERIPHERAL POLYNEUROPATHY: ICD-10-CM

## 2018-05-04 DIAGNOSIS — H91.93 DECREASED HEARING OF BOTH EARS: ICD-10-CM

## 2018-05-04 DIAGNOSIS — H93.13 TINNITUS, BILATERAL: ICD-10-CM

## 2018-05-04 DIAGNOSIS — D12.6 ADENOMATOUS POLYP OF COLON, UNSPECIFIED PART OF COLON: ICD-10-CM

## 2018-05-04 DIAGNOSIS — I25.10 CORONARY ARTERY DISEASE INVOLVING NATIVE HEART WITHOUT ANGINA PECTORIS, UNSPECIFIED VESSEL OR LESION TYPE: Primary | ICD-10-CM

## 2018-05-04 DIAGNOSIS — E78.5 HYPERLIPIDEMIA LDL GOAL <100: ICD-10-CM

## 2018-05-04 DIAGNOSIS — R09.82 POSTNASAL DRIP: ICD-10-CM

## 2018-05-04 LAB
HBA1C MFR BLD: 5.6 % (ref 0–5.6)
VIT B12 SERPL-MCNC: 661 PG/ML (ref 193–986)

## 2018-05-04 PROCEDURE — 83090 ASSAY OF HOMOCYSTEINE: CPT | Performed by: INTERNAL MEDICINE

## 2018-05-04 PROCEDURE — 86039 ANTINUCLEAR ANTIBODIES (ANA): CPT | Performed by: INTERNAL MEDICINE

## 2018-05-04 PROCEDURE — 84165 PROTEIN E-PHORESIS SERUM: CPT | Performed by: INTERNAL MEDICINE

## 2018-05-04 PROCEDURE — 99204 OFFICE O/P NEW MOD 45 MIN: CPT | Performed by: INTERNAL MEDICINE

## 2018-05-04 PROCEDURE — 80048 BASIC METABOLIC PNL TOTAL CA: CPT | Performed by: INTERNAL MEDICINE

## 2018-05-04 PROCEDURE — 00000402 ZZHCL STATISTIC TOTAL PROTEIN: Performed by: INTERNAL MEDICINE

## 2018-05-04 PROCEDURE — 83036 HEMOGLOBIN GLYCOSYLATED A1C: CPT | Performed by: INTERNAL MEDICINE

## 2018-05-04 PROCEDURE — 86235 NUCLEAR ANTIGEN ANTIBODY: CPT | Performed by: INTERNAL MEDICINE

## 2018-05-04 PROCEDURE — 36415 COLL VENOUS BLD VENIPUNCTURE: CPT | Performed by: INTERNAL MEDICINE

## 2018-05-04 PROCEDURE — 82607 VITAMIN B-12: CPT | Performed by: INTERNAL MEDICINE

## 2018-05-04 PROCEDURE — 84443 ASSAY THYROID STIM HORMONE: CPT | Performed by: INTERNAL MEDICINE

## 2018-05-04 PROCEDURE — 86038 ANTINUCLEAR ANTIBODIES: CPT | Performed by: INTERNAL MEDICINE

## 2018-05-04 RX ORDER — OMEGA-3 FATTY ACIDS/FISH OIL 300-1000MG
400 CAPSULE ORAL EVERY 4 HOURS PRN
Qty: 120 CAPSULE | COMMUNITY
Start: 2018-05-04 | End: 2021-05-13

## 2018-05-04 RX ORDER — UBIDECARENONE 100 MG
100 CAPSULE ORAL DAILY
COMMUNITY
Start: 2018-05-04

## 2018-05-04 RX ORDER — ALBUTEROL SULFATE 90 UG/1
2 AEROSOL, METERED RESPIRATORY (INHALATION) EVERY 6 HOURS PRN
Qty: 1 INHALER | Refills: 0 | Status: SHIPPED | OUTPATIENT
Start: 2018-05-04 | End: 2020-08-20

## 2018-05-04 RX ORDER — FLUTICASONE PROPIONATE 50 MCG
1-2 SPRAY, SUSPENSION (ML) NASAL DAILY PRN
Qty: 3 BOTTLE | Refills: 1 | COMMUNITY
Start: 2018-05-04 | End: 2018-07-31

## 2018-05-04 ASSESSMENT — ANXIETY QUESTIONNAIRES
IF YOU CHECKED OFF ANY PROBLEMS ON THIS QUESTIONNAIRE, HOW DIFFICULT HAVE THESE PROBLEMS MADE IT FOR YOU TO DO YOUR WORK, TAKE CARE OF THINGS AT HOME, OR GET ALONG WITH OTHER PEOPLE: NOT DIFFICULT AT ALL
6. BECOMING EASILY ANNOYED OR IRRITABLE: SEVERAL DAYS
1. FEELING NERVOUS, ANXIOUS, OR ON EDGE: SEVERAL DAYS
2. NOT BEING ABLE TO STOP OR CONTROL WORRYING: NOT AT ALL
5. BEING SO RESTLESS THAT IT IS HARD TO SIT STILL: NOT AT ALL
3. WORRYING TOO MUCH ABOUT DIFFERENT THINGS: NOT AT ALL
GAD7 TOTAL SCORE: 2
7. FEELING AFRAID AS IF SOMETHING AWFUL MIGHT HAPPEN: NOT AT ALL

## 2018-05-04 ASSESSMENT — PATIENT HEALTH QUESTIONNAIRE - PHQ9: 5. POOR APPETITE OR OVEREATING: NOT AT ALL

## 2018-05-04 ASSESSMENT — ACTIVITIES OF DAILY LIVING (ADL)
CURRENT_FUNCTION: NO ASSISTANCE NEEDED
I_NEED_ASSISTANCE_FOR_THE_FOLLOWING_DAILY_ACTIVITIES:: NO ASSISTANCE IS NEEDED

## 2018-05-04 NOTE — PATIENT INSTRUCTIONS
Plan:  1. Labs today   2. ENT referral   3. Albuterol 2 puffs 4 times a day as needed for the cough   4. Chest CT - To schedule this test you may call Scheduling center at [phone]    5. Urology referral Your provider has referred you to:   [facility]: [facility] [place] [phone]      6. Colonoscopy -   Appt. Line [phone] with GI   7. Try Benadryl at bed time    8.  Follow up in about a month

## 2018-05-04 NOTE — PROGRESS NOTES
Patient's instructions / PLAN:                                                        Plan:  1. Labs today   2. ENT referral   3. Albuterol 2 puffs 4 times a day as needed for the cough   4. Chest CT - To schedule this test you may call Scheduling center at [phone]    5. Urology referral Your provider has referred you to:   [facility]: [facility] [place] [phone]      6. Colonoscopy -   Appt. Line [phone] with GI   7. Try Benadryl at bed time    8.  Follow up in about a month         ASSESSMENT & PLAN:                                                          (I25.10) CAD on coronary angio   (primary encounter diagnosis)    (N07.330) Ascending aorta dilatation (H)  Comment: stable   Plan: f/u with cardio       (E78.5) Hyperlipidemia LDL goal <100  Comment: Controlled   LDL Cholesterol Calculated   Date Value Ref Range Status   04/19/2018 58 <100 mg/dL Final     Comment:     Desirable:       <100 mg/dl   ]   Plan: Continue same meds, same doses for now     (M79.1) Muscular aches  Comment: Possible related with statin  Plan: Continue statin.  See discussion below     (R05) Chronic cough  Comment:most likely secondary  Postnasal drip but I suspect the postnasal drip triggered asthma, .  I think he needs ENT evaluation to make sure there is no lesions.  The chest x-ray have been negative.  We will give a trial for albuterol.  If the cough persists we can consider chest CT  Plan: CT Chest w/o Contrast, albuterol (PROAIR         HFA/PROVENTIL HFA/VENTOLIN HFA) 108 (90 Base)         MCG/ACT Inhaler, OTOLARYNGOLOGY REFERRAL      (R09.82) Postnasal drip  Comment:   Plan: OTOLARYNGOLOGY REFERRAL            (R73.09) Blood sugar increased  Comment:   Plan: Hemoglobin A1c, TSH with free T4 reflex            (R35.0) Urinary frequency  Comment:   Plan: Urology referral    (G62.9) Peripheral polyneuropathy  Comment:   Plan: Anti Nuclear Oksana IgG by IFA with Reflex,         Protein electrophoresis, SSA Ro  GEORGE Antibody         IgG, Scleroderma Antibody Scl70 GEOGRE IgG,         Homocysteine, Vitamin B12, SSB La GEORGE Antibody         IgG            (R53.83) Other fatigue  Comment: non specific   Plan: Basic metabolic panel            (H93.13) Tinnitus, bilateral  Comment:   Plan: OTOLARYNGOLOGY REFERRAL            (H91.93) Decreased hearing of both ears  Comment:   Plan: OTOLARYNGOLOGY REFERRAL            (D12.6) Adenomatous polyp of colon,   Comment:   Plan: GASTROENTEROLOGY ADULT REF PROCEDURE ONLY            (Z12.11) Special screening for malignant neoplasms, colon  Comment:   Plan: GASTROENTEROLOGY ADULT REF PROCEDURE ONLY               Chief Complaint:                                                      estab care    List of things       SUBJECTIVE:                                                    History of present illness     Patient states that she was referred to our clinic by cardiologist, Dr. MATA.  He used to go to [facility], but the doctors retired and then he did not feel comfortable there because of the high doctors turnover.    He states he feels well and he does not have acute complaints.  He brought though a list of issues that he wants to talk to me.  I explained him that time will not permit to address everything on the list and we should focus one, possible two things that are more important for him.     He talks w a lot of details   We addressed the chronic cough  And a low potassium.  He insisted to address his feet numbness numbness.  I summarized our appointment 3 times and( I explained him that I am late for the other patients) but I had to reopen the charts because he insisted to address the back pain, urologic issues, hepatitis C, ears.  It was his first appointment at the clinic and I try to accommodate as much as I could.       CAD - dr Mata note reviewed  --The patient has been doing really well, with no chest pain, with the present medical treatment  --He had angiogram 2013 and  2015 which showed moderate disease, but no significant obstruction.  He is LVEF was in normal range with borderline ascending aorta dilatation    Chr cough  -- for years, he has seen different providers in the past with no help  --He feels that the most helpful appointment was with dr Cox a month ago, the meds helped for short term ( 2 weeks) : flonase helped the most, then Montelukast. The tessalon didn't help.   -- coughed for all winter   --He feels psotnasal drip, he is constantly clearing the throat.  But he feels that his cough is coming from deep in his chest  --The cough bothers him a lot, it affects his sleep and his quality of life  -- uses cough drops all the time, he 1 falls asleep with a drop in his mouth  -- no ENT eval   -- neg CXR 2015   -- other alergies meds in the past haven't helped   -- hasn't tried Albuterol   -- use to smoke few cigars for 3-4 years. Quit 40 y ago     Legs ache   -- at night, mostly when he falls asleep   -- also gen muscle aches   --I explained him that a lot of muscle aches can be related with Crestor treatment.  From what I noticed in Dr. BLOCK notes is that the angina resolved with aggressive medical treatment.  We talked about pros and cons of Crestor treatment  --Since the  Legs pain  are not affecting him very much I think he should continue Crestor  --He already takes CoQ10 enzyme    Urology  --For few months he has been feeling lower back pain, not radiating down to the legs but sometimes associated with groin discomfort and tender testicles.  --Many years ago he had an episode of low back pain that radiated to the testicles.  It turned out that he had a varicocele and the pain resolved with the  Varicocele treatment.  He has not noticed any scrotal or testicle swelling at this time  --For the last months he noticed frequency on urination, more in the evening and 1- 3 times at night.  No blood in urine, no burning with urination    Ringing in years  --He had a  "recent audiology test which shows some loss of very high pitches sound.  The left ear seems better than the right  -- No ear pain    Adenom colon polyp  -- dx in 2015   -- needs f/u in 3 years  -- referral done today     he wants screening test for hepatitis C, diabetes and he needs follow-up on low  Potassium.   I reviewed the chart: He tested negative for hepatitis C last year.  His blood sugars have been little elevated and of course we will check the A1c        ROS:                                                      ROS: negative for fever, chills, wheezes, chest pain, shortness of breath, vomiting, abdominal pain, leg swelling positive for chronic cough, as above    A 10-point review of systems was obtained.  Those pertinent are above and in the in the Subjective section.  The rest of the systems are negative.        OBJECTIVE:                                                    Physical Exam :    Blood pressure 140/80, pulse 82, temperature 98.2  F (36.8  C), temperature source Oral, resp. rate 14, height 5' 11\" (1.803 m), weight 192 lb 9.6 oz (87.4 kg), SpO2 97 %.   NAD, appears comfortable  Skin: no rashes   HEENT: PERRLA, EOMI, pink conjunctiva, anicteric sclerae, bilateral tympanic membranes are clinically normal, oropharynx is normal color  Neck: supple, no JVD, . No thyroidmegaly. Lymph nodes nonpalpable cervical and supraclavicular.  Chest: clear to auscultation bilaterally, good respiratory effort  Heart: S1 S2, RRR, no mgr appreciated  Abdomen: soft, not tender, no hepatosplenomegaly or masses appreciated, no abdominal bruit, present bowel sounds  Extremities: no edema, clubbing, cyanosis. Palpable pedal pulses bilaterally,  Neurologic: A, Ox3, no focal signs appreciated    PMHx: reviewed  Past Medical History:   Diagnosis Date     CAD (coronary artery disease)     diffuse CAD      Chest pain     chronic stable     Chronic stable angina (H)      Contact dermatitis and other eczema, due to unspecified " "cause      DEPRESSIVE DISORDER NEC      Dyslipidemia      Fam hx-cardiovas dis NEC     Mother and Father     HTN (hypertension)      IRRITABLE COLON      Mild aortic stenosis      Personal history of urinary calculi      Skin cancer, basal cell 2008     SOB (shortness of breath)      Varicose veins       PSHx: reviewed  Past Surgical History:   Procedure Laterality Date     ANGIOGRAM  11/17/2015    Med Tx, no flow limiting lesions     C URETER ENDOSCOPY THRU URETEROSTOMY REMV FB OR CALCULUS  2001    dr de la garza     EYE EXAM ESTABLISHED PT  2009     HC COLONOSCOPY THRU STOMA, DIAGNOSTIC  2005     HC EXCISE VARICOCELE      \"cautery\" through intra venous approach     HC KNEE SCOPE, DIAGNOSTIC  2004    Arthroscopy, Knee/left knee      HC REPAIR ING HERNIA,5+Y/O,REDUCIBL  1990's    Inguinal Hernia Repair  Right     HC VASECTOMY UNILAT/BILAT W POSTOP SEMEN      Vasectomy     TEST NOT FOUND  2002    Per cut removal of L kidney stone        Meds: reviewed  Current Outpatient Prescriptions   Medication Sig Dispense Refill     aspirin 81 MG tablet Take by mouth daily       benzonatate (TESSALON) 200 MG capsule Take 1 capsule (200 mg) by mouth 3 times daily as needed for cough 21 capsule 0     coenzyme Q-10 200 MG CAPS Take 200 mg by mouth daily       COMPRESSION STOCKINGS 1 each daily 2 each 4     DiphenhydrAMINE HCl, Sleep, 50 MG TABS Take 50 mg by mouth nightly as needed 14 tablet      fluticasone (FLONASE) 50 MCG/ACT spray Spray 1-2 sprays into both nostrils daily 3 Bottle 1     fluticasone (FLONASE) 50 MCG/ACT spray Spray 1-2 sprays into both nostrils daily as needed for rhinitis or allergies 3 Bottle 1     hydrochlorothiazide (HYDRODIURIL) 25 MG tablet Take 1 tablet (25 mg) by mouth daily 90 tablet 3     ibuprofen 200 MG capsule Take 400 mg by mouth every 4 hours as needed for fever 120 capsule      montelukast (SINGULAIR) 10 MG tablet Take 1 tablet (10 mg) by mouth At Bedtime 30 tablet 0     naproxen sodium (ALEVE) 220 MG " tablet Take 220 mg by mouth as needed for moderate pain       nitroglycerin (NITROSTAT) 0.4 MG SL tablet Place under the tongue every 5 minutes as needed       potassium chloride SA (K-DUR/KLOR-CON M) 20 MEQ CR tablet Take 1 tablet (20 mEq) by mouth daily 30 tablet 11     rosuvastatin (CRESTOR) 40 MG tablet Take 1 tablet (40 mg) by mouth daily 90 tablet 0       Soc Hx: reviewed  Fam Hx: reviewed      Time spent with the patient  50 min, more than 50% in counseling and coordinating care, Re above medical problems CAD, cholesterol, statin, muscle aches, chronic cough, postnasal drip, no numbness, tinnitus, decreased hearing, reviewing the chart and the medications and answering his multiple questions    Natasha Arias MD  Internal Medicine

## 2018-05-04 NOTE — MR AVS SNAPSHOT
After Visit Summary   5/4/2018    Nilesh Dos Santos    MRN: [id number]           Patient Information     Date Of Birth          1945        Visit Information        Provider Department      5/4/2018 12:20 PM Natasha Syed MD [facility]        Today's Diagnoses     Special screening for malignant neoplasms, colon    -  1    Chronic cough        Postnasal drip        Blood sugar increased        Urinary frequency        Peripheral polyneuropathy        Other fatigue          Care Instructions    Plan:  1. Labs today   2. ENT referral   3. Albuterol 2 puffs 4 times a day as needed for the cough   4. Chest CT - To schedule this test you may call Scheduling center at [phone]    5. Urology referral Your provider has referred you to:   [facility]: [facility] [place] [phone]      6. Colonoscopy -   Appt. Line [phone] with GI   7. Try Benadryl at bed time    8.  Follow up in about a month           Follow-ups after your visit        Additional Services     GASTROENTEROLOGY ADULT REF PROCEDURE ONLY       Last Lab Result: Creatinine (mg/dL)       Date                     Value                 04/30/2018               1.25             ----------  Body mass index is 26.86 kg/(m^2).      Patient will be contacted to schedule procedure.     Please be aware that coverage of these services is subject to the terms and limitations of your health insurance plan.  Call member services at your health plan with any benefit or coverage questions.  Any procedures must be performed at a [place] facility OR coordinated by your clinic's referral office.    Please bring the following with you to your appointment:    (1) Any X-Rays, CTs or MRIs which have been performed.  Contact the facility where they were done to arrange for  prior to your scheduled appointment.    (2) List of current medications   (3) This referral request   (4) Any documents/labs given to  you for this referral            OTOLARYNGOLOGY REFERRAL       Your provider has referred you to:     N: [facility] [place] [phone]   [email or web address]    FHN: [facility] [phone]   [email or web address]  N: [facility] [phone]   [email or web address]  N: [facility] [place] [facility] [place] [phone]   [email or web address]  N: [facility] [place] [phone]   [email or web address]  N: [place] [facility] [phone]   [email or web address]  N: [facility] [phone]   [email or web address]    Please be aware that coverage of these services is subject to the terms and limitations of your health insurance plan.  Call member services at your health plan with any benefit or coverage questions.      Please bring the following to your appointment:  >>   Any x-rays, CTs or MRIs which have been performed.  Contact the facility where they were done to arrange for  prior to your scheduled appointment.  Any new CT, MRI or other procedures ordered by your specialist must be performed at a [facility] or coordinated by your clinic's referral office.    >>   List of current medications   >>   This referral request   >>   Any documents/labs given to you for this referral                  Future tests that were ordered for you today     Open Future Orders        Priority Expected Expires Ordered    CT Chest w/o Contrast Routine  5/4/2019 5/4/2018            Who to contact     If you have questions or need follow up information about today's clinic visit or your schedule please contact [facility] directly at [phone].  Normal or non-critical lab and imaging  "results will be communicated to you by MyChart, letter or phone within 4 business days after the clinic has received the results. If you do not hear from us within 7 days, please contact the clinic through [employer] or phone. If you have a critical or abnormal lab result, we will notify you by phone as soon as possible.  Submit refill requests through [employer] or call your pharmacy and they will forward the refill request to us. Please allow 3 business days for your refill to be completed.          Additional Information About Your Visit        [employer] Information     [employer] gives you secure access to your electronic health record. If you see a primary care provider, you can also send messages to your care team and make appointments. If you have questions, please call your primary care clinic.  If you do not have a primary care provider, please call [phone] and they will assist you.        Care EveryWhere ID     This is your Care EveryWhere ID. This could be used by other organizations to access your [place] medical records  [id number]        Your Vitals Were     Pulse Temperature Respirations Height Pulse Oximetry BMI (Body Mass Index)    82 98.2  F (36.8  C) (Oral) 14 5' 11\" (1.803 m) 97% 26.86 kg/m2       Blood Pressure from Last 3 Encounters:   05/04/18 140/80   04/19/18 138/82   04/11/18 112/72    Weight from Last 3 Encounters:   05/04/18 192 lb 9.6 oz (87.4 kg)   04/19/18 195 lb (88.5 kg)   04/11/18 197 lb 9.6 oz (89.6 kg)              We Performed the Following     Anti Nuclear Oksana IgG by IFA with Reflex     GASTROENTEROLOGY ADULT REF PROCEDURE ONLY     Hemoglobin A1c     Homocysteine     OTOLARYNGOLOGY REFERRAL     Protein electrophoresis     Scleroderma Antibody Scl70 GEORGE IgG     SSA Ro GEORGE Antibody IgG     SSB La GEORGE Antibody IgG     TSH with free T4 reflex     Vitamin B12          Today's Medication Changes          These changes are accurate as of 5/4/18  1:14 PM.  If you have any questions, ask " your nurse or doctor.               Start taking these medicines.        Dose/Directions    albuterol 108 (90 Base) MCG/ACT Inhaler   Commonly known as:  PROAIR HFA/PROVENTIL HFA/VENTOLIN HFA   Used for:  Chronic cough   Started by:  Natasha Syed MD        Dose:  2 puff   Inhale 2 puffs into the lungs every 6 hours as needed for shortness of breath / dyspnea or wheezing   Quantity:  1 Inhaler   Refills:  0         These medicines have changed or have updated prescriptions.        Dose/Directions    coenzyme Q-10 200 MG Caps   This may have changed:  Another medication with the same name was removed. Continue taking this medication, and follow the directions you see here.   Changed by:  Natasha Syed MD        Dose:  200 mg   Take 200 mg by mouth daily   Refills:  0            Where to get your medicines      These medications were sent to [facility] [address] [place] [address]     Phone:  [phone]     albuterol 108 (90 Base) MCG/ACT Inhaler                Primary Care Provider Fax #    [facility] [phone] [address]  [facility] [address]        Equal Access to Services     MAURIZIO PENA : Tim godfreyo Sotony, waaxda luqadaha, qaybta kaalmada adeegyada, wali muñoz. So [facility] [phone].    ATENCIÓN: Si habla español, tiene a abbasi disposición servicios gratuitos de asistencia lingüística. Llame al [phone].    We comply with applicable federal civil rights laws and [place] laws. We do not discriminate on the basis of race, color, national origin, age, disability, sex, sexual orientation, or gender identity.            Thank you!     Thank you for choosing [facility]  for your care. Our goal is always to provide you with excellent care. Hearing back from our patients is one way we can continue to  improve our services. Please take a few minutes to complete the written survey that you may receive in the mail after your visit with us. Thank you!             Your Updated Medication List - Protect others around you: Learn how to safely use, store and throw away your medicines at www.disposemymeds.org.          This list is accurate as of 5/4/18  1:14 PM.  Always use your most recent med list.                   Brand Name Dispense Instructions for use Diagnosis    albuterol 108 (90 Base) MCG/ACT Inhaler    PROAIR HFA/PROVENTIL HFA/VENTOLIN HFA    1 Inhaler    Inhale 2 puffs into the lungs every 6 hours as needed for shortness of breath / dyspnea or wheezing    Chronic cough       ALEVE 220 MG tablet   Generic drug:  naproxen sodium      Take 220 mg by mouth as needed for moderate pain        aspirin 81 MG tablet      Take by mouth daily        benzonatate 200 MG capsule    TESSALON    21 capsule    Take 1 capsule (200 mg) by mouth 3 times daily as needed for cough    Cough       coenzyme Q-10 200 MG Caps      Take 200 mg by mouth daily        COMPRESSION STOCKINGS     2 each    1 each daily    Varicose veins of lower extremities with other complications       DiphenhydrAMINE HCl (Sleep) 50 MG Tabs     14 tablet    Take 50 mg by mouth nightly as needed        * fluticasone 50 MCG/ACT spray    FLONASE    3 Bottle    Spray 1-2 sprays into both nostrils daily    Chronic vasomotor rhinitis       * fluticasone 50 MCG/ACT spray    FLONASE    3 Bottle    Spray 1-2 sprays into both nostrils daily as needed for rhinitis or allergies        hydrochlorothiazide 25 MG tablet    HYDRODIURIL    90 tablet    Take 1 tablet (25 mg) by mouth daily    Essential hypertension, benign       ibuprofen 200 MG capsule     120 capsule    Take 400 mg by mouth every 4 hours as needed for fever        montelukast 10 MG tablet    SINGULAIR    30 tablet    Take 1 tablet (10 mg) by mouth At Bedtime    Cough       NITROSTAT 0.4 MG sublingual  tablet   Generic drug:  nitroGLYcerin      Place under the tongue every 5 minutes as needed        potassium chloride SA 20 MEQ CR tablet    K-DUR/KLOR-CON M    30 tablet    Take 1 tablet (20 mEq) by mouth daily    CAD (coronary artery disease)       rosuvastatin 40 MG tablet    CRESTOR    90 tablet    Take 1 tablet (40 mg) by mouth daily    Mixed hyperlipidemia       * Notice:  This list has 2 medication(s) that are the same as other medications prescribed for you. Read the directions carefully, and ask your doctor or other care provider to review them with you.

## 2018-05-04 NOTE — NURSING NOTE
"Pt here today to establish care. Vitals today are as follows:   Vital Signs 5/4/2018   Systolic 140   Diastolic 80   Pulse 82   Temperature 98.2   Respirations 14   Weight (LB) 192 lb 9.6 oz   Height 5' 11\"   BMI (Calculated) 26.92   O2 97   Medication reconciliation: Completed.   Kamilla Hollins CMA    "

## 2018-05-05 LAB
ANION GAP SERPL CALCULATED.3IONS-SCNC: 9 MMOL/L (ref 3–14)
BUN SERPL-MCNC: 19 MG/DL (ref 7–30)
CALCIUM SERPL-MCNC: 9.2 MG/DL (ref 8.5–10.1)
CHLORIDE SERPL-SCNC: 108 MMOL/L (ref 94–109)
CO2 SERPL-SCNC: 26 MMOL/L (ref 20–32)
CREAT SERPL-MCNC: 1.22 MG/DL (ref 0.66–1.25)
GFR SERPL CREATININE-BSD FRML MDRD: 58 ML/MIN/1.7M2
GLUCOSE SERPL-MCNC: 92 MG/DL (ref 70–99)
POTASSIUM SERPL-SCNC: 3.3 MMOL/L (ref 3.4–5.3)
SODIUM SERPL-SCNC: 143 MMOL/L (ref 133–144)
TSH SERPL DL<=0.005 MIU/L-ACNC: 0.71 MU/L (ref 0.4–4)

## 2018-05-05 ASSESSMENT — PATIENT HEALTH QUESTIONNAIRE - PHQ9: SUM OF ALL RESPONSES TO PHQ QUESTIONS 1-9: 6

## 2018-05-05 ASSESSMENT — ANXIETY QUESTIONNAIRES: GAD7 TOTAL SCORE: 2

## 2018-05-06 PROBLEM — I77.810 ASCENDING AORTA DILATATION (H): Status: ACTIVE | Noted: 2018-05-06

## 2018-05-07 LAB
ALBUMIN SERPL ELPH-MCNC: 4.3 G/DL (ref 3.7–5.1)
ALPHA1 GLOB SERPL ELPH-MCNC: 0.3 G/DL (ref 0.2–0.4)
ALPHA2 GLOB SERPL ELPH-MCNC: 0.8 G/DL (ref 0.5–0.9)
ANA PAT SER IF-IMP: ABNORMAL
ANA SER QL IF: POSITIVE
ANA TITR SER IF: ABNORMAL {TITER}
B-GLOBULIN SERPL ELPH-MCNC: 0.8 G/DL (ref 0.6–1)
ENA SCL70 IGG SER IA-ACNC: <0.2 AI (ref 0–0.9)
ENA SS-A IGG SER IA-ACNC: <0.2 AI (ref 0–0.9)
ENA SS-B IGG SER IA-ACNC: <0.2 AI (ref 0–0.9)
GAMMA GLOB SERPL ELPH-MCNC: 1 G/DL (ref 0.7–1.6)
M PROTEIN SERPL ELPH-MCNC: 0 G/DL
PROT PATTERN SERPL ELPH-IMP: NORMAL

## 2018-05-09 LAB — HCYS SERPL-SCNC: 10.6 UMOL/L (ref 4–12)

## 2018-05-29 ENCOUNTER — HOSPITAL ENCOUNTER (OUTPATIENT)
Facility: CLINIC | Age: 73
Discharge: HOME | End: 2018-05-29
Attending: INTERNAL MEDICINE | Admitting: INTERNAL MEDICINE
Payer: MEDICARE

## 2018-05-29 VITALS
DIASTOLIC BLOOD PRESSURE: 79 MMHG | RESPIRATION RATE: 17 BRPM | SYSTOLIC BLOOD PRESSURE: 126 MMHG | OXYGEN SATURATION: 96 %

## 2018-05-29 LAB — COLONOSCOPY: NORMAL

## 2018-05-29 PROCEDURE — 45378 DIAGNOSTIC COLONOSCOPY: CPT | Performed by: INTERNAL MEDICINE

## 2018-05-29 PROCEDURE — 25000128 H RX IP 250 OP 636: Performed by: INTERNAL MEDICINE

## 2018-05-29 PROCEDURE — G0500 MOD SEDAT ENDO SERVICE >5YRS: HCPCS | Performed by: INTERNAL MEDICINE

## 2018-05-29 PROCEDURE — G0105 COLORECTAL SCRN; HI RISK IND: HCPCS | Performed by: INTERNAL MEDICINE

## 2018-05-29 RX ORDER — LIDOCAINE 40 MG/G
CREAM TOPICAL
Status: DISCONTINUED | OUTPATIENT
Start: 2018-05-29 | End: 2018-05-29 | Stop reason: HOSPADM

## 2018-05-29 RX ORDER — FENTANYL CITRATE 50 UG/ML
INJECTION, SOLUTION INTRAMUSCULAR; INTRAVENOUS PRN
Status: DISCONTINUED | OUTPATIENT
Start: 2018-05-29 | End: 2018-05-29 | Stop reason: HOSPADM

## 2018-05-29 RX ORDER — ONDANSETRON 2 MG/ML
4 INJECTION INTRAMUSCULAR; INTRAVENOUS EVERY 6 HOURS PRN
Status: DISCONTINUED | OUTPATIENT
Start: 2018-05-29 | End: 2018-05-29 | Stop reason: HOSPADM

## 2018-05-29 RX ORDER — FLUMAZENIL 0.1 MG/ML
0.2 INJECTION, SOLUTION INTRAVENOUS
Status: DISCONTINUED | OUTPATIENT
Start: 2018-05-29 | End: 2018-05-29 | Stop reason: HOSPADM

## 2018-05-29 RX ORDER — ONDANSETRON 4 MG/1
4 TABLET, ORALLY DISINTEGRATING ORAL EVERY 6 HOURS PRN
Status: DISCONTINUED | OUTPATIENT
Start: 2018-05-29 | End: 2018-05-29 | Stop reason: HOSPADM

## 2018-05-29 RX ORDER — ONDANSETRON 2 MG/ML
4 INJECTION INTRAMUSCULAR; INTRAVENOUS
Status: DISCONTINUED | OUTPATIENT
Start: 2018-05-29 | End: 2018-05-29 | Stop reason: HOSPADM

## 2018-05-29 RX ORDER — NALOXONE HYDROCHLORIDE 0.4 MG/ML
.1-.4 INJECTION, SOLUTION INTRAMUSCULAR; INTRAVENOUS; SUBCUTANEOUS
Status: DISCONTINUED | OUTPATIENT
Start: 2018-05-29 | End: 2018-05-29 | Stop reason: HOSPADM

## 2018-05-29 NOTE — LETTER
May 9, 2018      Nilesh Dos Santos  [address]   [place] [address]        Dear Nilesh,     Thank you for choosing [facility]. You are scheduled for the following service.     Date:  Tuesday, May 29, 2018             Procedure:  COLONOSCOPY  Doctor:        Dr. Nilesh Cervantes   Arrival Time:  0930am  *check in at Emergency/Endoscopy desk*  Procedure Time:  1000am    Location:   [facility]        Endoscopy Department, First Floor (Enter through ER Doors) *        [address] [place], [place] [address]      [phone] or [phone] () to reschedule      MIRALAX -GATORADE  PREP  Colonoscopy is the most accurate test to detect colon polyps and colon cancer; and the only test where polyps can be removed. During this procedure, a doctor examines the lining of your large intestine and rectum through a flexible tube.           Transportation  Arrange for a ride for the day of your procedure with a responsible adult.  A taxi ride is not an option unless you are accompanied by a responsible adult. If you fail to arrange transportation with a responsible adult, your procedure will be cancelled and rescheduled.    Purchase the  following supplies at your local pharmacy:  - 2 (two) bisacodyl tablets: each tablet contains 5 mg.  (Dulcolax  laxative NOT Dulcolax  stool softener)   - 1 (one) 8.3 oz bottle of Polyethylene Glycol (PEG) 3350 Powder   (MiraLAX , Smooth LAX , ClearLAX  or equivalent)  - 64 oz Gatorade    Regular Gatorade, Gatorade G2 , Powerade , Powerade Zero  or Pedialyte  is acceptable. Red colored flavors are not allowed; all other colors (yellow, green, orange, purple and blue) are okay. It is also okay to buy two 2.12 oz packets of powdered Gatorade that can be mixed with water to a total volume of 64 oz of liquid.  - 1 (one) 10 oz bottle of Magnesium Citrate (Red colored flavors are not allowed)  It is also okay for you to use a 0.5 oz  package of powdered magnesium citrate (17 g) mixed with 10 oz of water.    PREPARATION FOR COLONOSCOPY    7 days before:    Discontinue fiber supplements and medications containing iron. This includes Metamucil  and Fibercon ; and multivitamins with iron.  3 days before:    Begin a low-fiber diet. A low-fiber diet helps making the cleanout more effective.     Examples of a low-fiber diet include (but are not limited to): white bread, white rice, pasta, crackers, fish, chicken, eggs, ground beef, creamy peanut butter, cooked/steamed/boiled vegetables, canned fruit, bananas, melons, milk, plain yogurt cheese, salad dressing and other condiments.     The following are not allowed on a low-fiber diet: seeds, nuts, popcorn, bran, whole wheat, corn, quinoa, raw fruits and vegetables, berries and dried fruit, beans and lentils.    For additional details on low-fiber diet, please refer to the table on the last page.  2 days before:    Continue the low-fiber diet.     Drink at least 8 glasses of water throughout the day.     Stop eating solid foods at 11:45 pm.  1 day before:    In the morning: begin a clear liquid diet (liquids you can see through).     Examples of a clear liquid diet include: water, clear broth or bouillon, Gatorade, Pedialyte or Powerade, carbonated and non-carbonated soft drinks (Sprite , 7-Up , ginger ale), strained fruit juices without pulp (apple, white grape, white cranberry), Jell-O  and popsicles.     The following are not allowed on a clear liquid diet: red liquids, alcoholic beverages, coffee, dairy products (milk, creamer, and yogurt), protein shakes, creamy broths, juice with pulp and chewing tobacco.    At noon: take 2 (two) bisacodyl tablets     At 4 (and no later than 6pm): start drinking the Miralax-Gatorade preparation (8.3 oz of Miralax mixed with 64 oz of Gatorade in a large pitcher). Drink 1(one) 8 oz glass every 15 minutes thereafter, until the mixture is gone.    COLON CLEANSING TIPS:  drink adequate amounts of fluids before and after your colon cleansing to prevent dehydration. Stay near a toilet because you will have diarrhea. Even if you are sitting on the toilet, continue to drink the cleansing solution every 15 minutes. If you feel nauseous or vomit, rinse your mouth with water, take a 15 to 30-minute-break and then continue drinking the solution. You will be uncomfortable until the stool has flushed from your colon (in about 2 to 4 hours). You may feel chilled.              Day of your procedure  You may take all of your morning medications including blood pressure medications, blood thinners (if you have not been instructed to stop these by our office), methadone, anti-seizure medications with sips of water 3 hours prior to your procedure or earlier. Do not take insulin or vitamins prior to your procedure. Continue the clear liquid diet.   4 hours prior: drink 10 oz of magnesium citrate. It may be easier to drink it with a straw.    STOP consuming all liquids after that.     Do not take anything by mouth during this time.     Allow extra time to travel to your procedure as you may need to stop and use a restroom along the way.  You are ready for the procedure, if you followed all instructions and your stool is no longer formed, but clear or yellow liquid. If you are unsure whether your colon is clean, please call our office at [phone] before you leave for your appointment.  Bring the following to your procedure:  - Insurance Card/Photo ID.   - List of current medications including over-the-counter medications and supplements.   - Your rescue inhaler if you currently use one to control asthma.      Canceling or rescheduling your appointment:   If you must cancel or reschedule your appointment, please call [phone] as soon as possible.      COLONOSCOPY PRE-PROCEDURE CHECKLIST  If you have diabetes, ask your regular doctor for diet and medication restrictions.  If you take an  anticoagulant or anti-platelet medication (such as Coumadin , Lovenox , Pradaxa , Xarelto , Eliquis , etc.), please call your primary doctor for advice on holding this medication.  If you take aspirin you may continue to do so.  If you are or may be pregnant, please discuss the risks and benefits of this procedure with your doctor.          What happens during a colonoscopy?    Plan to spend up to two hours, starting at registration time, at the endoscopy center the day of your procedure. The colonoscopy takes an average of 15 to 30 minutes. Recovery time is about 30 minutes.    Before the exam:    You will change into a gown.    Your medical history and medication list will be reviewed with you, unless that has been done over the phone prior to the procedure.     A nurse will insert an intravenous (IV) line into your hand or arm.    The doctor will meet with you and will give you a consent form to sign.    During the exam:     Medicine will be given through the IV line to help you relax.     Your heart rate and oxygen levels will be monitored. If your blood pressure is low, you may be given fluids through the IV line.     The doctor will insert a flexible hollow tube, called a colonoscope, into your rectum. The scope will be advanced slowly through the large intestine (colon).    You may have a feeling of fullness or pressure.     If an abnormal tissue or a polyp is found, the doctor may remove it through the endoscope for closer examination, or biopsy. Tissue removal is painless    After the exam:           Any tissue samples removed during the exam will be sent to a lab for evaluation. It may take 5-7 working days for you to be notified of the results.     A nurse will provide you with complete discharge instructions before you leave the endoscopy center. Be sure to ask the nurse for specific instructions if you take blood thinners such as Aspirin, Coumadin or Plavix.     The doctor will prepare a full report for  you and for the physician who referred you for the procedure.     Your doctor will talk with you about the initial results of your exam.      Medication given during the exam will prohibit you from driving for the rest of the day.     Following the exam, you may resume your normal diet. Your first meal should be light, no greasy foods. Avoid alcohol until the next day.     You may resume your regular activities the day after the procedure.     LOW-FIBER DIET    Foods RECOMMENDED Foods to AVOID   Breads, Cereal, Rice and Pasta:   White bread, rolls, biscuits, croissant and milton toast.   Waffles, Nepali toast and pancakes.   White rice, noodles, pasta, macaroni and peeled cooked potatoes.   Plain crackers and saltines.   Cooked cereals: farina, cream of rice.   Cold cereals: Puffed Rice , Rice Krispies , Corn Flakes  and Special K    Breads, Cereal, Rice and Pasta:   Breads or rolls with nuts, seeds or fruit.   Whole wheat, pumpernickel, rye breads and cornbread.   Potatoes with skin, brown or wild rice, and kasha (buckwheat).     Vegetables:   Tender cooked and canned vegetables without seeds: carrots, asparagus tips, green or wax beans, pumpkin, spinach, lima beans. Vegetables:   Raw or steamed vegetables.   Vegetables with seeds.   Sauerkraut.   Winter squash, peas, broccoli, Brussel sprouts, cabbage, onions, cauliflower, baked beans, peas and corn.   Fruits:   Strained fruit juice.   Canned fruit, except pineapple.   Ripe bananas and melon. Fruits:   Prunes and prune juice.   Raw fruits.   Dried fruits: figs, dates and raisins.   Milk/Dairy:   Milk: plain or flavored.   Yogurt, custard and ice cream.   Cheese and cottage cheese Milk/Dairy:     Meat and other proteins:   ground, well-cooked tender beef, lamb, ham, veal, pork, fish, poultry and organ meats.   Eggs.   Peanut butter without nuts. Meat and other proteins:   Tough, fibrous meats with gristle.   Dry beans, peas and lentils.   Peanut butter with  nuts.   Tofu.   Fats, Snack, Sweets, Condiments and Beverages:   Margarine, butter, oils, mayonnaise, sour cream and salad dressing, plain gravy.   Sugar, hard candy, clear jelly, honey and syrup.   Spices, cooked herbs, bouillon, broth and soups made with allowed vegetable, ketchup and mustard.   Coffee, tea and carbonated drinks.   Plain cakes, cookies and pretzels.   Gelatin, plain puddings, custard, ice cream, sherbet and popsicles. Fats, Snack, Sweets, Condiments and Beverages:   Nuts, seeds and coconut.   Jam, marmalade and preserves.   Pickles, olives, relish and horseradish.   All desserts containing nuts, seeds, dried fruit and coconut; or made from whole grains or bran.   Candy made with nuts or seeds.   Popcorn.                     DIRECTIONS TO THE ENDOSCOPY DEPARTMENT     From the north ([facility])  Take [address], exit on [address] [address]. Get into the left hand linda, turn left (east), go one-half mile to [address] and turn left. Go north to the first stoplight, take a right on [address] and follow it to the Emergency entrance.    From the south ([facility])  Take [address] to the [address] split and exit on [address] [address]. On [address] [address], turn left (west) to [address]. Turn right (north) on [address]. Go north to the first stoplight, take a right on [address] and follow it to the Emergency entrance.    From the east via [address] ([facility])  Take [address] south to [address] [address] exit. Turn right on [address] [address]. Go west to [address]. Turn right (north) on [address]. Go to the first stoplight, take a right and follow on [address] to the Emergency entrance.    From the east via [address] ([facility])  Take [address] to [address]. Turn left (south) on [address] to [address]. Turn left (east) on [address] and follow it to the Emergency entrance.    From the west via [address] ([place])  Take  [address] east to [address]. Turn right (south) on [address] to [address]. Turn left (east) on [address] and follow it to the Emergency entrance.        Sincerely,        [facility] Endoscopy Department

## 2018-05-29 NOTE — IP AVS SNAPSHOT
MRN:[id number]                      After Visit Summary   5/29/2018    Nilesh Dos Santos    MRN: [id number]           Thank you!     Thank you for choosing [facility] for your care. Our goal is always to provide you with excellent care. Hearing back from our patients is one way we can continue to improve our services. Please take a few minutes to complete the written survey that you may receive in the mail after you visit. If you would like to speak to someone directly about your visit please contact Patient Relations at [phone]. Thank you!          Patient Information     Date Of Birth          1945        About your hospital stay     You were admitted on:  May 29, 2018 You last received care in the:  [facility]    You were discharged on:  May 29, 2018       Who to Call     For medical emergencies, please call 911.  For non-urgent questions about your medical care, please call your primary care provider or clinic, None  For questions related to your surgery, please call your surgery clinic        Attending Provider     Provider Specialty    Nilesh Cervantes MD Gastroenterology       Primary Care Provider Fax #    [facility] [phone]      Further instructions from your care team         Understanding Diverticulosis and Diverticulitis     Pouches or diverticula usually occur in the lower part of the colon called the sigmoid.      Diverticulitis occurs when the pouches become inflamed.     The colon (large intestine) is the last part of the digestive tract. It absorbs water from stool and changes it from a liquid to a solid. In certain cases, small pouches called diverticula can form in the colon wall. This condition is called diverticulosis. The pouches can become infected. If this happens, it becomes a more serious problem called diverticulitis. These problems can be painful. But they can be managed.   Managing Your Condition  Diet changes  or taking medications are often tried first. These may be enough to bring relief. If the case is bad, surgery may be done. You and your doctor can discuss the plan that is best for you.  If You Have Diverticulosis  Diet changes are often enough to control symptoms. The main changes are adding fiber (roughage) and drinking more water. Fiber absorbs water as it travels through your colon. This helps your stool stay soft and move smoothly. Water helps this process. If needed, you may be told to take over-the-counter stool softeners. To help relieve pain, antispasmodic medications may be prescribed.  If You Have Diverticulitis  Treatment depends on how bad your symptoms are.  For mild symptoms: You may be put on a liquid diet for a short time. You may also be prescribed antibiotics. If these two steps relieve your symptoms, you may then be prescribed a high-fiber diet. If you still have symptoms, your doctor will discuss further treatment options with you.  For severe symptoms: You may need to be admitted to the hospital. There, you can be given IV antibiotics and fluids. Once symptoms are under control, the above treatments may be tried. If these don t control your condition, your doctor may discuss the option of having surgery with you.  [facility] to Colon Health  Help keep your colon healthy with a diet that includes plenty of high-fiber fruits, vegetables, and whole grains. Drink plenty of liquids like water and juice. Your doctor may also recommend avoiding seeds and nuts.          0310-0134 [address], [place] [address]. All rights reserved. This information is not intended as a substitute for professional medical care. Always follow your healthcare professional's instructions.    HIGH FIBER DIET  Fiber is present in all fruits, vegetables, cereals and grains. Fiber passes through the body undigested. A high fiber diet helps food move through the intestinal tract. The added bulk is helpful  in preventing constipation. In people with diverticulosis it serves to clean out the pouches along the colon wall while preventing new ones from forming. A high fiber diet also reduces the risk of colon cancer, decreases blood cholesterol and prevents high blood sugar in people with diabetes.    The foods listed below are high in fiber and should be included in your diet. If you are not used to high fiber foods, start with 1 or 2 foods from this list. Every 3-4 days add a new one to your diet until you are eating 4 high fiber foods per day. This should give you 20-35 Gm of fiber/day. It is also important to drink a lot of water when you are on this diet (6-8 glasses a day). Water causes the fiber to swell and increases the benefit.    FOODS HIGH IN DIETARY FIBER:  BREADS: Made with 100% whole wheat flour; salima, wheat or rye crackers; tortillas, bran muffins  CEREALS: Whole grain cereal with bran (Chex, Raisin Bran, Corn Bran), oatmeal, rolled oats, granola, wheat flakes, brown rice  NUTS: Any nuts  FRUITS: All fresh fruits along with edible skins, (bananas, citrus fruit, mangoes, pears, prunes, raisins, apples, pineapple, apricot, melon, jams and marmalades), fruit juices (especially prune juice)  VEGETABLES: All types, preferably raw or lightly cooked: especially, celery, eggplant, potatoes, spinach, broccoli, brussel sprouts, winter squash, carrots, cauliflower, soybeans, lentils, fresh and dried beans of all kinds  OTHER: Popcorn, any spices      2628-8320 [place] [address]. All rights reserved. This information is not intended as a substitute for professional medical care. Always follow your healthcare professional's instructions.    Pending Results     No orders found from 5/27/2018 to 5/30/2018.            Admission Information     Date & Time Provider Department Dept. Phone    5/29/2018 Nilesh Cervantes MD [facility] [phone]      Your Vitals  Were     Blood Pressure Respirations Pulse Oximetry             118/87 15 96%         [facility] Information     [employer] gives you secure access to your electronic health record. If you see a primary care provider, you can also send messages to your care team and make appointments. If you have questions, please call your primary care clinic.  If you do not have a primary care provider, please call [phone] and they will assist you.        Care EveryWhere ID     This is your Care EveryWhere ID. This could be used by other organizations to access your [facility] medical records  [id number]        Equal Access to Services     [facility]: Hadii damián prasad hademmanuelle Sotony, waaxda luqadaha, qaybparesh kaalmaevelyn acosta, wali ross . So [facility] [phone].    ATENCIÓN: Si habla español, tiene a abbasi disposición servicios gratuitos de asistencia lingüística. Llame al [phone].    We comply with applicable federal civil rights laws and [place] laws. We do not discriminate on the basis of race, color, national origin, age, disability, sex, sexual orientation, or gender identity.               Review of your medicines      CONTINUE these medicines which have NOT CHANGED        Dose / Directions    albuterol 108 (90 Base) MCG/ACT Inhaler   Commonly known as:  PROAIR HFA/PROVENTIL HFA/VENTOLIN HFA   Used for:  Chronic cough        Dose:  2 puff   Inhale 2 puffs into the lungs every 6 hours as needed for shortness of breath / dyspnea or wheezing   Quantity:  1 Inhaler   Refills:  0       ALEVE 220 MG tablet   Generic drug:  naproxen sodium        Dose:  220 mg   Take 220 mg by mouth as needed for moderate pain   Refills:  0       aspirin 81 MG tablet        Take by mouth daily   Refills:  0       benzonatate 200 MG capsule   Commonly known as:  TESSALON   Used for:  Cough        Dose:  200 mg   Take 1 capsule (200 mg) by mouth 3 times daily as needed for cough   Quantity:  21 capsule   Refills:  0        coenzyme Q-10 200 MG Caps        Dose:  200 mg   Take 200 mg by mouth daily   Refills:  0       COMPRESSION STOCKINGS   Used for:  Varicose veins of lower extremities with other complications        Dose:  1 each   1 each daily   Quantity:  2 each   Refills:  4       DiphenhydrAMINE HCl (Sleep) 50 MG Tabs        Dose:  50 mg   Take 50 mg by mouth nightly as needed   Quantity:  14 tablet   Refills:  0       * fluticasone 50 MCG/ACT spray   Commonly known as:  FLONASE   Used for:  Chronic vasomotor rhinitis        Dose:  1-2 spray   Spray 1-2 sprays into both nostrils daily   Quantity:  3 Bottle   Refills:  1       * fluticasone 50 MCG/ACT spray   Commonly known as:  FLONASE        Dose:  1-2 spray   Spray 1-2 sprays into both nostrils daily as needed for rhinitis or allergies   Quantity:  3 Bottle   Refills:  1       hydrochlorothiazide 25 MG tablet   Commonly known as:  HYDRODIURIL   Used for:  Essential hypertension, benign        Dose:  25 mg   Take 1 tablet (25 mg) by mouth daily   Quantity:  90 tablet   Refills:  3       ibuprofen 200 MG capsule        Dose:  400 mg   Take 400 mg by mouth every 4 hours as needed for fever   Quantity:  120 capsule   Refills:  0       montelukast 10 MG tablet   Commonly known as:  SINGULAIR   Used for:  Cough        Dose:  10 mg   Take 1 tablet (10 mg) by mouth At Bedtime   Quantity:  30 tablet   Refills:  0       NITROSTAT 0.4 MG sublingual tablet   Generic drug:  nitroGLYcerin        Place under the tongue every 5 minutes as needed   Refills:  0       potassium chloride SA 20 MEQ CR tablet   Commonly known as:  K-DUR/KLOR-CON M   Used for:  CAD (coronary artery disease)        Dose:  20 mEq   Take 1 tablet (20 mEq) by mouth daily   Quantity:  30 tablet   Refills:  11       rosuvastatin 40 MG tablet   Commonly known as:  CRESTOR   Used for:  Mixed hyperlipidemia        Dose:  40 mg   Take 1 tablet (40 mg) by mouth daily   Quantity:  90 tablet   Refills:  0       * Notice:   This list has 2 medication(s) that are the same as other medications prescribed for you. Read the directions carefully, and ask your doctor or other care provider to review them with you.             Protect others around you: Learn how to safely use, store and throw away your medicines at www.disposemymeds.org.             Medication List: This is a list of all your medications and when to take them. Check marks below indicate your daily home schedule. Keep this list as a reference.      Medications           Morning Afternoon Evening Bedtime As Needed    albuterol 108 (90 Base) MCG/ACT Inhaler   Commonly known as:  PROAIR HFA/PROVENTIL HFA/VENTOLIN HFA   Inhale 2 puffs into the lungs every 6 hours as needed for shortness of breath / dyspnea or wheezing                                ALEVE 220 MG tablet   Take 220 mg by mouth as needed for moderate pain   Generic drug:  naproxen sodium                                aspirin 81 MG tablet   Take by mouth daily                                benzonatate 200 MG capsule   Commonly known as:  TESSALON   Take 1 capsule (200 mg) by mouth 3 times daily as needed for cough                                coenzyme Q-10 200 MG Caps   Take 200 mg by mouth daily                                COMPRESSION STOCKINGS   1 each daily                                DiphenhydrAMINE HCl (Sleep) 50 MG Tabs   Take 50 mg by mouth nightly as needed                                * fluticasone 50 MCG/ACT spray   Commonly known as:  FLONASE   Spray 1-2 sprays into both nostrils daily                                * fluticasone 50 MCG/ACT spray   Commonly known as:  FLONASE   Spray 1-2 sprays into both nostrils daily as needed for rhinitis or allergies                                hydrochlorothiazide 25 MG tablet   Commonly known as:  HYDRODIURIL   Take 1 tablet (25 mg) by mouth daily                                ibuprofen 200 MG capsule   Take 400 mg by mouth every 4 hours as needed for  fever                                montelukast 10 MG tablet   Commonly known as:  SINGULAIR   Take 1 tablet (10 mg) by mouth At Bedtime                                NITROSTAT 0.4 MG sublingual tablet   Place under the tongue every 5 minutes as needed   Generic drug:  nitroGLYcerin                                potassium chloride SA 20 MEQ CR tablet   Commonly known as:  K-DUR/KLOR-CON M   Take 1 tablet (20 mEq) by mouth daily                                rosuvastatin 40 MG tablet   Commonly known as:  CRESTOR   Take 1 tablet (40 mg) by mouth daily                                * Notice:  This list has 2 medication(s) that are the same as other medications prescribed for you. Read the directions carefully, and ask your doctor or other care provider to review them with you.

## 2018-05-29 NOTE — DISCHARGE INSTRUCTIONS
Understanding Diverticulosis and Diverticulitis     Pouches or diverticula usually occur in the lower part of the colon called the sigmoid.      Diverticulitis occurs when the pouches become inflamed.     The colon (large intestine) is the last part of the digestive tract. It absorbs water from stool and changes it from a liquid to a solid. In certain cases, small pouches called diverticula can form in the colon wall. This condition is called diverticulosis. The pouches can become infected. If this happens, it becomes a more serious problem called diverticulitis. These problems can be painful. But they can be managed.   Managing Your Condition  Diet changes or taking medications are often tried first. These may be enough to bring relief. If the case is bad, surgery may be done. You and your doctor can discuss the plan that is best for you.  If You Have Diverticulosis  Diet changes are often enough to control symptoms. The main changes are adding fiber (roughage) and drinking more water. Fiber absorbs water as it travels through your colon. This helps your stool stay soft and move smoothly. Water helps this process. If needed, you may be told to take over-the-counter stool softeners. To help relieve pain, antispasmodic medications may be prescribed.  If You Have Diverticulitis  Treatment depends on how bad your symptoms are.  For mild symptoms: You may be put on a liquid diet for a short time. You may also be prescribed antibiotics. If these two steps relieve your symptoms, you may then be prescribed a high-fiber diet. If you still have symptoms, your doctor will discuss further treatment options with you.  For severe symptoms: You may need to be admitted to the hospital. There, you can be given IV antibiotics and fluids. Once symptoms are under control, the above treatments may be tried. If these don t control your condition, your doctor may discuss the option of having surgery with you.  [place] to Colon  Health  Help keep your colon healthy with a diet that includes plenty of high-fiber fruits, vegetables, and whole grains. Drink plenty of liquids like water and juice. Your doctor may also recommend avoiding seeds and nuts.          [id number] Sue Leslie, [address] [place] [address], [place] [address]. All rights reserved. This information is not intended as a substitute for professional medical care. Always follow your healthcare professional's instructions.    HIGH FIBER DIET  Fiber is present in all fruits, vegetables, cereals and grains. Fiber passes through the body undigested. A high fiber diet helps food move through the intestinal tract. The added bulk is helpful in preventing constipation. In people with diverticulosis it serves to clean out the pouches along the colon wall while preventing new ones from forming. A high fiber diet also reduces the risk of colon cancer, decreases blood cholesterol and prevents high blood sugar in people with diabetes.    The foods listed below are high in fiber and should be included in your diet. If you are not used to high fiber foods, start with 1 or 2 foods from this list. Every 3-4 days add a new one to your diet until you are eating 4 high fiber foods per day. This should give you 20-35 Gm of fiber/day. It is also important to drink a lot of water when you are on this diet (6-8 glasses a day). Water causes the fiber to swell and increases the benefit.    FOODS HIGH IN DIETARY FIBER:  BREADS: Made with 100% whole wheat flour; salima, wheat or rye crackers; tortillas, bran muffins  CEREALS: Whole grain cereal with bran (Chex, Raisin Bran, Corn Bran), oatmeal, rolled oats, granola, wheat flakes, brown rice  NUTS: Any nuts  FRUITS: All fresh fruits along with edible skins, (bananas, citrus fruit, mangoes, pears, prunes, raisins, apples, pineapple, apricot, melon, jams and marmalades), fruit juices (especially prune juice)  VEGETABLES: All types, preferably raw or lightly  cooked: especially, celery, eggplant, potatoes, spinach, broccoli, brussel sprouts, winter squash, carrots, cauliflower, soybeans, lentils, fresh and dried beans of all kinds  OTHER: Popcorn, any spices      [id number] Sue Leslie, [address] [place] [address], [place] [address]. All rights reserved. This information is not intended as a substitute for professional medical care. Always follow your healthcare professional's instructions.

## 2018-05-29 NOTE — IP AVS SNAPSHOT
[facility]    [address] [facility] [address]    Phone:  [phone]    Fax:  [phone]                                       After Visit Summary   5/29/2018    Nilesh Dos Santos    MRN: [id number]           After Visit Summary Signature Page     I have received my discharge instructions, and my questions have been answered. I have discussed any challenges I see with this plan with the nurse or doctor.    ..........................................................................................................................................  Patient/Patient Representative Signature      ..........................................................................................................................................  Patient Representative Print Name and Relationship to Patient    ..................................................               ................................................  Date                                            Time    ..........................................................................................................................................  Reviewed by Signature/Title    ...................................................              ..............................................  Date                                                            Time

## 2018-05-29 NOTE — H&P
Pre-Endoscopy History and Physical     Nilesh Dos Santos MRN# [id number]   YOB: 1945 Age: 73 year old     Date of Procedure: 5/29/2018  Primary care provider: [facility]  Type of Endoscopy: Colonoscopy with possible biopsy, possible polypectomy  Reason for Procedure: screen  Type of Anesthesia Anticipated: Conscious Sedation    HPI:    Nilesh is a 73 year old male who will be undergoing the above procedure.      A history and physical has been performed. The patient's medications and allergies have been reviewed. The risks and benefits of the procedure and the sedation options and risks were discussed with the patient.  All questions were answered and informed consent was obtained.      He denies a personal or family history of anesthesia complications or bleeding disorders.     Patient Active Problem List   Diagnosis     Calculus of kidney     Allergic rhinitis     Mixed hyperlipidemia     History of colonic polyps     Essential hypertension, benign     Health Care Home     ACP (advance care planning)     DJD (degenerative joint disease) of knee     Abnormal glucose     HTN (hypertension)     Varicose veins     Coronary artery disease involving native coronary artery of native heart without angina pectoris     Left inguinal hernia     Ascending aorta dilatation (H)        Past Medical History:   Diagnosis Date     CAD (coronary artery disease)     diffuse CAD      Chest pain     chronic stable     Chronic stable angina (H)      Contact dermatitis and other eczema, due to unspecified cause      DEPRESSIVE DISORDER NEC      Dyslipidemia      Fam hx-cardiovas dis NEC     Mother and Father     HTN (hypertension)      IRRITABLE COLON      Mild aortic stenosis      Personal history of urinary calculi      Skin cancer, basal cell 2008     SOB (shortness of breath)      Varicose veins         Past Surgical History:   Procedure Laterality Date     ANGIOGRAM  11/17/2015    Med Tx, no flow  "limiting lesions     C URETER ENDOSCOPY THRU URETEROSTOMY REMV FB OR CALCULUS      dr de la garza     EYE EXAM ESTABLISHED PT  2009     HC COLONOSCOPY THRU STOMA, DIAGNOSTIC       HC EXCISE VARICOCELE      \"cautery\" through intra venous approach     HC KNEE SCOPE, DIAGNOSTIC      Arthroscopy, Knee/left knee      HC REPAIR ING HERNIA,5+Y/O,REDUCIBL      Inguinal Hernia Repair  Right     HC VASECTOMY UNILAT/BILAT W POSTOP SEMEN      Vasectomy     TEST NOT FOUND      Per cut removal of L kidney stone       Social History   Substance Use Topics     Smoking status: Former Smoker     Types: Cigars     Quit date: 1975     Smokeless tobacco: Never Used      Comment: 2-3 cigars     Alcohol use 2.0 oz/week     4 Glasses of wine per week      Comment: 1-2 glass of wine daily       Family History   Problem Relation Age of Onset     HEART DISEASE Mother      91     Cancer - colorectal Mother      HEART DISEASE Father       70s     DIABETES Maternal Aunt      Alzheimer Disease No family hx of      CANCER No family hx of      Prostate Cancer No family hx of        Prior to Admission medications    Medication Sig Start Date End Date Taking? Authorizing Provider   albuterol (PROAIR HFA/PROVENTIL HFA/VENTOLIN HFA) 108 (90 Base) MCG/ACT Inhaler Inhale 2 puffs into the lungs every 6 hours as needed for shortness of breath / dyspnea or wheezing 18   Juana-Natasha Carpenter MD   aspirin 81 MG tablet Take by mouth daily    Reported, Patient   benzonatate (TESSALON) 200 MG capsule Take 1 capsule (200 mg) by mouth 3 times daily as needed for cough 18   Mis Rothman MD   coenzyme Q-10 200 MG CAPS Take 200 mg by mouth daily 18   Doctor, MD Jozef   COMPRESSION STOCKINGS 1 each daily 1/9/15   Juan David Salgado MD   DiphenhydrAMINE HCl, Sleep, 50 MG TABS Take 50 mg by mouth nightly as needed 18   Doctor, None, MD   fluticasone (FLONASE) 50 MCG/ACT spray Spray 1-2 sprays into both " "nostrils daily as needed for rhinitis or allergies 5/4/18   Doctor, Jozef, MD   fluticasone (FLONASE) 50 MCG/ACT spray Spray 1-2 sprays into both nostrils daily 2/14/18   Mis Rothman MD   hydrochlorothiazide (HYDRODIURIL) 25 MG tablet Take 1 tablet (25 mg) by mouth daily 7/31/17   Bharat Mata MD   ibuprofen 200 MG capsule Take 400 mg by mouth every 4 hours as needed for fever 5/4/18   Doctor, Jozef, MD   montelukast (SINGULAIR) 10 MG tablet Take 1 tablet (10 mg) by mouth At Bedtime 4/11/18   Mis Rothman MD   naproxen sodium (ALEVE) 220 MG tablet Take 220 mg by mouth as needed for moderate pain    Reported, Patient   nitroglycerin (NITROSTAT) 0.4 MG SL tablet Place under the tongue every 5 minutes as needed    Reported, Patient   potassium chloride SA (K-DUR/KLOR-CON M) 20 MEQ CR tablet Take 1 tablet (20 mEq) by mouth daily 4/19/18   Bharat Mata MD   rosuvastatin (CRESTOR) 40 MG tablet Take 1 tablet (40 mg) by mouth daily 2/6/18   Bharat Mata MD       Allergies   Allergen Reactions     Amoxicillin      severe rash     Contrast Dye Hives     Patient states this was years ago and he believes he has had dye since that time without problems.        REVIEW OF SYSTEMS:   5 point ROS negative except as noted above in HPI, including Gen., Resp., CV, GI &  system review.    PHYSICAL EXAM:   There were no vitals taken for this visit. Estimated body mass index is 26.86 kg/(m^2) as calculated from the following:    Height as of 5/4/18: 1.803 m (5' 11\").    Weight as of 5/4/18: 87.4 kg (192 lb 9.6 oz).   GENERAL APPEARANCE: alert, and oriented  MENTAL STATUS: alert  AIRWAY EXAM: Mallampatti Class I (visualization of the soft palate, fauces, uvula, anterior and posterior pillars)  RESP: lungs clear to auscultation - no rales, rhonchi or wheezes  CV: regular rates and rhythm  DIAGNOSTICS:    Not indicated    IMPRESSION   ASA Class 2 - Mild systemic disease    PLAN:   Plan for Colonoscopy with possible biopsy, " possible polypectomy. We discussed the risks, benefits and alternatives and the patient wished to proceed.    The above has been forwarded to the consulting provider.      Signed Electronically by: Nilesh Cervantes  May 29, 2018

## 2018-06-07 ENCOUNTER — TELEPHONE (OUTPATIENT)
Dept: INTERNAL MEDICINE | Facility: CLINIC | Age: 73
End: 2018-06-07

## 2018-06-07 DIAGNOSIS — I10 ESSENTIAL HYPERTENSION, BENIGN: ICD-10-CM

## 2018-06-07 RX ORDER — HYDROCHLOROTHIAZIDE 25 MG/1
25 TABLET ORAL DAILY
Qty: 90 TABLET | Refills: 3 | Status: SHIPPED | OUTPATIENT
Start: 2018-06-07 | End: 2019-03-26

## 2018-06-07 NOTE — TELEPHONE ENCOUNTER
Panel Management Review      Patient has the following on his problem list:     Hypertension   Last three blood pressure readings:  BP Readings from Last 3 Encounters:   05/29/18 126/79   05/04/18 140/80   04/19/18 138/82     Blood pressure: Passed    HTN Guidelines:  Age 18-59 BP range:  Less than 140/90  Age 60-85 with Diabetes:  Less than 140/90  Age 60-85 without Diabetes:  less than 150/90      Composite cancer screening  Chart review shows that this patient is due/due soon for the following None  Summary:    Patient is due/failing the following:   It is likely he was failing due to lipids, but this was addressed at his LOV (met with Juana for the first time on 5/4/18). It does look like Dr. Arias had recommended that he schedule follow-up visit with her in one month (so around 6/4/18), does not have any future appointments scheduled at this time. He did just have colonoscopy done a few days ago, so that may be why he has not scheduled yet. Will send him letter to remind him.     Action needed:   Patient needs office visit for follow up with Dr. Arias.    Type of outreach:    Sent letter.    Questions for provider review:    None                                                                                                                                    [facility]       Chart not routed.

## 2018-06-07 NOTE — LETTER
[facility]  [address], [address]  [place], [place]  [address]                                            TEL:[phone]  FAX:[phone]      Nilesh Dos Santos  [address] [place] [address] [address] [place] [address]      June 7, 2018    Dear Nilesh       This is just a friendly reminder that Dr. Arias wanted to see you back one month after your initial visit, so you were due to meet with her around 6/4/18.   It looks like you had a lot of other testing done recently (with your colonoscopy and other health items), so we just wanted to make sure this didn't slip through the cracks.   Appointments can be scheduled by calling our main line at [phone], or you can also schedule these through [employer].   Please let us know if you have any questions or concerns.   Hope things are well for you!  Thank you!      Sincerely,      Natasha Syed M.D.

## 2018-06-19 DIAGNOSIS — E78.2 MIXED HYPERLIPIDEMIA: ICD-10-CM

## 2018-06-19 RX ORDER — ROSUVASTATIN CALCIUM 40 MG/1
40 TABLET, COATED ORAL DAILY
Qty: 90 TABLET | Refills: 3 | Status: SHIPPED | OUTPATIENT
Start: 2018-06-19 | End: 2018-06-20

## 2018-06-20 DIAGNOSIS — E78.2 MIXED HYPERLIPIDEMIA: ICD-10-CM

## 2018-06-20 RX ORDER — ROSUVASTATIN CALCIUM 40 MG/1
40 TABLET, COATED ORAL DAILY
Qty: 90 TABLET | Refills: 3 | Status: SHIPPED | OUTPATIENT
Start: 2018-06-20 | End: 2019-05-28

## 2018-06-26 ENCOUNTER — TRANSFERRED RECORDS (OUTPATIENT)
Dept: HEALTH INFORMATION MANAGEMENT | Facility: CLINIC | Age: 73
End: 2018-06-26

## 2018-06-29 DIAGNOSIS — J30.0 CHRONIC VASOMOTOR RHINITIS: ICD-10-CM

## 2018-06-29 DIAGNOSIS — R05.9 COUGH: ICD-10-CM

## 2018-06-29 RX ORDER — MONTELUKAST SODIUM 10 MG/1
10 TABLET ORAL AT BEDTIME
Qty: 90 TABLET | Refills: 3 | Status: SHIPPED | OUTPATIENT
Start: 2018-06-29 | End: 2018-07-31

## 2018-06-29 RX ORDER — FLUTICASONE PROPIONATE 50 MCG
1-2 SPRAY, SUSPENSION (ML) NASAL DAILY
Qty: 3 BOTTLE | Refills: 3 | Status: SHIPPED | OUTPATIENT
Start: 2018-06-29

## 2018-06-29 NOTE — TELEPHONE ENCOUNTER
Nilesh Dos Santos is requesting a refill of:    Pending Prescriptions:                       Disp   Refills    fluticasone (FLONASE) 50 MCG/ACT spray    3 Maxwell*1            Sig: Spray 1-2 sprays into both nostrils daily    montelukast (SINGULAIR) 10 MG tablet      30 tab*0            Sig: Take 1 tablet (10 mg) by mouth At Bedtime    Please close encounter if RX was sent. Thanks, Grace    Pt will  the Rx copy

## 2018-07-31 ENCOUNTER — OFFICE VISIT (OUTPATIENT)
Dept: INTERNAL MEDICINE | Facility: CLINIC | Age: 73
End: 2018-07-31
Payer: COMMERCIAL

## 2018-07-31 VITALS
WEIGHT: 187.5 LBS | TEMPERATURE: 98.2 F | OXYGEN SATURATION: 96 % | HEIGHT: 71 IN | RESPIRATION RATE: 14 BRPM | HEART RATE: 82 BPM | SYSTOLIC BLOOD PRESSURE: 120 MMHG | DIASTOLIC BLOOD PRESSURE: 68 MMHG | BODY MASS INDEX: 26.25 KG/M2

## 2018-07-31 DIAGNOSIS — M54.50 RIGHT-SIDED LOW BACK PAIN WITHOUT SCIATICA, UNSPECIFIED CHRONICITY: ICD-10-CM

## 2018-07-31 DIAGNOSIS — B35.1 ONYCHOMYCOSIS: ICD-10-CM

## 2018-07-31 DIAGNOSIS — I10 ESSENTIAL HYPERTENSION: ICD-10-CM

## 2018-07-31 DIAGNOSIS — I25.10 CORONARY ARTERY DISEASE INVOLVING NATIVE HEART WITHOUT ANGINA PECTORIS, UNSPECIFIED VESSEL OR LESION TYPE: Primary | ICD-10-CM

## 2018-07-31 DIAGNOSIS — J31.0 CHRONIC RHINITIS, UNSPECIFIED TYPE: ICD-10-CM

## 2018-07-31 DIAGNOSIS — R05.9 COUGH: ICD-10-CM

## 2018-07-31 PROCEDURE — 99214 OFFICE O/P EST MOD 30 MIN: CPT | Performed by: INTERNAL MEDICINE

## 2018-07-31 RX ORDER — CICLOPIROX 80 MG/ML
SOLUTION TOPICAL
Qty: 3 BOTTLE | Refills: 3 | Status: SHIPPED | OUTPATIENT
Start: 2018-07-31 | End: 2020-06-30

## 2018-07-31 RX ORDER — IPRATROPIUM BROMIDE 21 UG/1
2 SPRAY, METERED NASAL 3 TIMES DAILY PRN
Qty: 1 BOX | Refills: 1 | Status: SHIPPED | OUTPATIENT
Start: 2018-07-31 | End: 2020-06-30

## 2018-07-31 RX ORDER — POTASSIUM CHLORIDE 1500 MG/1
20 TABLET, EXTENDED RELEASE ORAL PRN
Qty: 90 TABLET | Refills: 1 | Status: SHIPPED | OUTPATIENT
Start: 2018-07-31 | End: 2019-06-10

## 2018-07-31 RX ORDER — MONTELUKAST SODIUM 10 MG/1
10 TABLET ORAL
Qty: 90 TABLET | Refills: 3 | COMMUNITY
Start: 2018-07-31 | End: 2020-08-20

## 2018-07-31 RX ORDER — POTASSIUM CHLORIDE 1500 MG/1
20 TABLET, EXTENDED RELEASE ORAL PRN
Qty: 30 TABLET | Refills: 11 | COMMUNITY
Start: 2018-07-31 | End: 2018-07-31

## 2018-07-31 NOTE — PROGRESS NOTES
Dr Arias's note      Patient's instructions / PLAN:                                                        Plan:  1. Before you schedule any surgery, please see dr Mata  2. Chest CT - To schedule this test you may call Scheduling center at [phone]    3. Ciclopirox to nails daily as we discussed   4. Amy Michelle, [phone], dial [phone]  - physical therapist at [facility], [place]   5. Ipratropium nasal spray 3 times a day as needed - if insurance covers   6. Follow up in October, nonfasting labs before        ASSESSMENT & PLAN:                                                      (I25.10) CAD (coronary artery disease)  (primary encounter diagnosis)  Comment: stable   Plan:  potassium chloride SA         (K-DUR/KLOR-CON M) 20 MEQ CR tablet            (R05) Cough  Comment:   Plan: montelukast (SINGULAIR) 10 MG tablet            (I10) Essential hypertension  Comment: Controlled    Plan: potassium chloride SA (K-DUR/KLOR-CON M) 20 MEQ        CR tablet, Basic metabolic panel            (B35.1) Onychomycosis  Comment: We discussed about the new meds, advantages and potential side effects. The patient will read also the info from the pharmacy and call back if questions.   Plan: ciclopirox 8 % SOLN            (M54.5) Right-sided low back pain without sciatica, unspecified chronicity  Comment:   Plan: [facility]            (J31.0) Chronic rhinitis, unspecified type  Comment: We discussed about the new meds, advantages and potential side effects. The patient will read also the info from the pharmacy and call back if questions.   Plan: ipratropium (ATROVENT) 0.03 % spray               Chief complaint:                                                      Follow up chronic medical problems      SUBJECTIVE:   Nilesh Dos Santos is a 73 year old male who presents to clinic today for the following health issues:    Chronic rhinitis and cough   --He thinks his symptoms are little better since  "he takes Flonase twice a day  -- ENT referral - small tumor nasopharygeal - he will have surgery in the future   -- He hasn't tried ALbuterol inhaler yet     Urology - appointment was postponed     Potassium on the low side     LBP  -- on/off   -- especialy when he wakes up in am   -- takes tylenol     R great toe nail   -- fungus for many years, but now it is tender  -- no lamisil because of crestor     Some feet numbness. A1c 5.6 Rest of the blood tests were neg     Hypertension Follow-up      Outpatient blood pressures are not being checked.    Low Salt Diet: not monitoring salt      Amount of exercise or physical activity: \"very little\", just does things around the house    Problems taking medications regularly: No    Medication side effects: none    Diet: regular (no restrictions)      LOV:Plan:  1. Labs today   2. ENT referral   3. Albuterol 2 puffs 4 times a day as needed for the cough   4. Chest CT - To schedule this test you may call Scheduling center at [phone]    5. Urology referral Your provider has referred you to:   [facility]: [facility] [phone]      6. Colonoscopy -   Appt. Line [phone] with GI   7. Try Benadryl at bed time    8.  Follow up in about a month         Review of Systems:                                                      ROS: negative for fever, chills, cough, wheezes, chest pain, shortness of breath, vomiting, abdominal pain, leg swelling     OBJECTIVE:             Physical exam:  Blood pressure 120/68, pulse 82, temperature 98.2  F (36.8  C), temperature source Oral, resp. rate 14, height 5' 11\" (1.803 m), weight 187 lb 8 oz (85 kg), SpO2 96 %.   NAD, appears comfortable  Skin: no rashes   Neck: supple, no JVD,  No thyroidmegaly. Lymph nodes nonpalpable cervical and supraclavicular.  Chest: clear to auscultation bilaterally, good respiratory effort  Heart: S1 S2, RRR, no mgr appreciated  Abdomen: soft, not tender,   Extremities: no edema, causes " "of the right great toes  Neurologic: A, Ox3, no focal signs appreciated  Musculoskeletal: the paraspinal muscles in the lumbar aremild  tender and tense on palpation      PMHx: reviewed  Past Medical History:   Diagnosis Date     CAD (coronary artery disease)     diffuse CAD      Chest pain     chronic stable     Chronic stable angina (H)      Contact dermatitis and other eczema, due to unspecified cause      DEPRESSIVE DISORDER NEC      Dyslipidemia      Fam hx-cardiovas dis NEC     Mother and Father     HTN (hypertension)      IRRITABLE COLON      Mild aortic stenosis      Personal history of urinary calculi      Skin cancer, basal cell 2008     SOB (shortness of breath)      Varicose veins       PSHx: reviewed  Past Surgical History:   Procedure Laterality Date     ANGIOGRAM  11/17/2015    Med Tx, no flow limiting lesions     C URETER ENDOSCOPY THRU URETEROSTOMY REMV FB OR CALCULUS  2001    dr de la garza     COLONOSCOPY Left 5/29/2018    Procedure: COLONOSCOPY;  colonoscopy (fv)  ;  Surgeon: Nilesh Cervantes MD;  Location:  GI     EYE EXAM ESTABLISHED PT  2009     HC COLONOSCOPY THRU STOMA, DIAGNOSTIC  2005     HC EXCISE VARICOCELE      \"cautery\" through intra venous approach     HC KNEE SCOPE, DIAGNOSTIC  2004    Arthroscopy, Knee/left knee      HC REPAIR ING HERNIA,5+Y/O,REDUCIBL  1990's    Inguinal Hernia Repair  Right     HC VASECTOMY UNILAT/BILAT W POSTOP SEMEN      Vasectomy     TEST NOT FOUND  2002    Per cut removal of L kidney stone        Meds: reviewed  Current Outpatient Prescriptions   Medication Sig Dispense Refill     albuterol (PROAIR HFA/PROVENTIL HFA/VENTOLIN HFA) 108 (90 Base) MCG/ACT Inhaler Inhale 2 puffs into the lungs every 6 hours as needed for shortness of breath / dyspnea or wheezing 1 Inhaler 0     aspirin 81 MG tablet Take by mouth daily       coenzyme Q-10 200 MG CAPS Take 200 mg by mouth daily       COMPRESSION STOCKINGS 1 each daily 2 each 4     DiphenhydrAMINE HCl, Sleep, 50 MG " TABS Take 50 mg by mouth nightly as needed 14 tablet      fluticasone (FLONASE) 50 MCG/ACT spray Spray 1-2 sprays into both nostrils daily 3 Bottle 3     hydrochlorothiazide (HYDRODIURIL) 25 MG tablet Take 1 tablet (25 mg) by mouth daily 90 tablet 3     ibuprofen 200 MG capsule Take 400 mg by mouth every 4 hours as needed for fever 120 capsule      montelukast (SINGULAIR) 10 MG tablet Take 1 tablet (10 mg) by mouth nightly as needed 90 tablet 3     naproxen sodium (ALEVE) 220 MG tablet Take 220 mg by mouth as needed for moderate pain       nitroglycerin (NITROSTAT) 0.4 MG SL tablet Place under the tongue every 5 minutes as needed       potassium chloride SA (K-DUR/KLOR-CON M) 20 MEQ CR tablet Take 1 tablet (20 mEq) by mouth as needed for potassium supplementation (per  recommendation) 30 tablet 11     rosuvastatin (CRESTOR) 40 MG tablet Take 1 tablet (40 mg) by mouth daily 90 tablet 3     [DISCONTINUED] montelukast (SINGULAIR) 10 MG tablet Take 1 tablet (10 mg) by mouth At Bedtime 90 tablet 3       Soc Hx: reviewed  Fam Hx: reviewed          Natasha Arias MD  Internal Medicine

## 2018-07-31 NOTE — PATIENT INSTRUCTIONS
Plan:  1. Before you schedule any surgery, please see dr Mata  2. Chest CT - To schedule this test you may call Scheduling center at [phone]    3. Ciclopirox to nails daily as we discussed   4. Amy Michelle, [phone], dial [phone]  - physical therapist at [facility] [place]   5. Ipratropium nasal spray 3 times a day as needed - if insurance covers   6. Follow up in October

## 2018-07-31 NOTE — MR AVS SNAPSHOT
After Visit Summary   7/31/2018    Nilesh Dos Santos    MRN: [id number]           Patient Information     Date Of Birth          1945        Visit Information        Provider Department      7/31/2018 3:20 PM Natasha Syed MD [facility]        Today's Diagnoses     Essential hypertension    -  1    CAD (coronary artery disease)        Cough        Onychomycosis        Right-sided low back pain without sciatica, unspecified chronicity        Chronic rhinitis, unspecified type          Care Instructions    Plan:  1. Before you schedule any surgery, please see dr Mata  2. Chest CT - To schedule this test you may call Scheduling center at [phone]    3. Ciclopirox to nails daily as we discussed   4. Amy Michelle, [phone], dial [phone]  - physical therapist at [facility], [place]   5. Ipratropium nasal spray 3 times a day as needed - if insurance covers   6. Follow up in October          Follow-ups after your visit        Additional Services     [facility] PT, HAND, AND CHIROPRACTIC REFERRAL       === This order will print in the [facility] Scheduling  Office ===    Your provider has referred you to: [facility], [phone]     Indication/Reason for Referral: LBP  Onset of Illness:  Weeks   Therapy Orders: Physical Therapy per protocol or clinical pathway.  Special Programs None   Special Request:Eval & Treat  Modalities: As indicated  Equipment: As indicated    Additional Comments:       Please be aware that coverage of these services is subject to the terms and limitations of your health insurance plan.  Call member services at your health plan with any benefit or coverage questions.      Please bring the following to your appointment:    >>   Any x-rays, CTs or MRIs which have been performed.  Contact the facility where they were done to arrange for  prior to your scheduled appointment.  Any new CT, MRI or other procedures ordered by your  specialist must be performed at a [facility] facility or coordinated by your clinic's referral office.    >>   List of current medications   >>   This referral request   >>   Any documents/labs given to you for this referral                  Your next 10 appointments already scheduled     Aug 22, 2018 10:30 AM CDT   New Patient Visit with Raphael Tanner MD   [facility] ([facility])    [address] [facility]  [address]  [facility] [address] [phone]              Who to contact     If you have questions or need follow up information about today's clinic visit or your schedule please contact [facility] directly at [phone].  Normal or non-critical lab and imaging results will be communicated to you by MyChart, letter or phone within 4 business days after the clinic has received the results. If you do not hear from us within 7 days, please contact the clinic through [employer] or phone. If you have a critical or abnormal lab result, we will notify you by phone as soon as possible.  Submit refill requests through [employer] or call your pharmacy and they will forward the refill request to us. Please allow 3 business days for your refill to be completed.          Additional Information About Your Visit        [employer] Information     [employer] gives you secure access to your electronic health record. If you see a primary care provider, you can also send messages to your care team and make appointments. If you have questions, please call your primary care clinic.  If you do not have a primary care provider, please call [phone] and they will assist you.        Care EveryWhere ID     This is your Care EveryWhere ID. This could be used by other organizations to access your [facility] medical records  [id number]        Your Vitals Were     Pulse Temperature Respirations Height Pulse Oximetry BMI (Body Mass Index)    82 98.2  F (36.8  " C) (Oral) 14 5' 11\" (1.803 m) 96% 26.15 kg/m2       Blood Pressure from Last 3 Encounters:   07/31/18 120/68   05/29/18 126/79   05/04/18 140/80    Weight from Last 3 Encounters:   07/31/18 187 lb 8 oz (85 kg)   05/04/18 192 lb 9.6 oz (87.4 kg)   04/19/18 195 lb (88.5 kg)              We Performed the Following     [facility] PT, HAND, AND CHIROPRACTIC REFERRAL          Today's Medication Changes          These changes are accurate as of 7/31/18  4:11 PM.  If you have any questions, ask your nurse or doctor.               Start taking these medicines.        Dose/Directions    ciclopirox 8 % Soln   Used for:  Onychomycosis   Started by:  Natasha Syed MD        Apply to adjacent skin and affected nails daily.  Remove with alcohol every 7 days, then repeat.   Quantity:  3 Bottle   Refills:  3       ipratropium 0.03 % spray   Commonly known as:  ATROVENT   Used for:  Chronic rhinitis, unspecified type   Started by:  Natasha Syed MD        Dose:  2 spray   Spray 2 sprays into both nostrils 3 times daily as needed for rhinitis   Quantity:  1 Box   Refills:  1       potassium chloride SA 20 MEQ CR tablet   Commonly known as:  K-DUR/KLOR-CON M   Used for:  Essential hypertension   Started by:  Natasha Syed MD        Dose:  20 mEq   Take 1 tablet (20 mEq) by mouth as needed for potassium supplementation   Quantity:  90 tablet   Refills:  1         These medicines have changed or have updated prescriptions.        Dose/Directions    montelukast 10 MG tablet   Commonly known as:  SINGULAIR   This may have changed:    - when to take this  - reasons to take this   Used for:  Cough   Changed by:  Natasha Syed MD        Dose:  10 mg   Take 1 tablet (10 mg) by mouth nightly as needed   Quantity:  90 tablet   Refills:  3         Stop taking these medicines if you haven't already. Please contact your care team if you have questions.     benzonatate 200 MG capsule "   Commonly known as:  TESSALON   Stopped by:  Natasha Syed MD                Where to get your medicines      These medications were sent to [facility] [address]  [address] [address]     Phone:  [phone]     ciclopirox 8 % Soln    ipratropium 0.03 % spray    potassium chloride SA 20 MEQ CR tablet                Primary Care Provider Office Phone # Fax #    Natasha Syed [phone]       [address] [facility] [address]        Equal Access to Services     : Hadii aad ku hadasho Soomaali, waaxda luqadaha, qaybta kaalmada adeegyada, waxay idiin hayaan adedavid ross . So [facility] [phone].    ATENCIÓN: Si habla español, tiene a abbasi disposición servicios gratuitos de asistencia lingüística. [facility] [phone].    We comply with applicable federal civil rights laws and [place] laws. We do not discriminate on the basis of race, color, national origin, age, disability, sex, sexual orientation, or gender identity.            Thank you!     Thank you for choosing [facility]  for your care. Our goal is always to provide you with excellent care. Hearing back from our patients is one way we can continue to improve our services. Please take a few minutes to complete the written survey that you may receive in the mail after your visit with us. Thank you!             Your Updated Medication List - Protect others around you: Learn how to safely use, store and throw away your medicines at www.disposemymeds.org.          This list is accurate as of 7/31/18  4:11 PM.  Always use your most recent med list.                   Brand Name Dispense Instructions for use Diagnosis    albuterol 108 (90 Base) MCG/ACT Inhaler    PROAIR HFA/PROVENTIL HFA/VENTOLIN HFA    1 Inhaler    Inhale 2 puffs into the lungs every 6 hours as needed for shortness of breath / dyspnea or  wheezing    Chronic cough       ALEVE 220 MG tablet   Generic drug:  naproxen sodium      Take 220 mg by mouth as needed for moderate pain        aspirin 81 MG tablet      Take by mouth daily        ciclopirox 8 % Soln     3 Bottle    Apply to adjacent skin and affected nails daily.  Remove with alcohol every 7 days, then repeat.    Onychomycosis       coenzyme Q-10 200 MG Caps      Take 200 mg by mouth daily        COMPRESSION STOCKINGS     2 each    1 each daily    Varicose veins of lower extremities with other complications       DiphenhydrAMINE HCl (Sleep) 50 MG Tabs     14 tablet    Take 50 mg by mouth nightly as needed        fluticasone 50 MCG/ACT spray    FLONASE    3 Bottle    Spray 1-2 sprays into both nostrils daily    Chronic vasomotor rhinitis       hydrochlorothiazide 25 MG tablet    HYDRODIURIL    90 tablet    Take 1 tablet (25 mg) by mouth daily    Essential hypertension, benign       ibuprofen 200 MG capsule     120 capsule    Take 400 mg by mouth every 4 hours as needed for fever        ipratropium 0.03 % spray    ATROVENT    1 Box    Spray 2 sprays into both nostrils 3 times daily as needed for rhinitis    Chronic rhinitis, unspecified type       montelukast 10 MG tablet    SINGULAIR    90 tablet    Take 1 tablet (10 mg) by mouth nightly as needed    Cough       NITROSTAT 0.4 MG sublingual tablet   Generic drug:  nitroGLYcerin      Place under the tongue every 5 minutes as needed        potassium chloride SA 20 MEQ CR tablet    K-DUR/KLOR-CON M    90 tablet    Take 1 tablet (20 mEq) by mouth as needed for potassium supplementation    Essential hypertension       rosuvastatin 40 MG tablet    CRESTOR    90 tablet    Take 1 tablet (40 mg) by mouth daily    Mixed hyperlipidemia

## 2018-07-31 NOTE — NURSING NOTE
"/68 (BP Location: Right arm, Patient Position: Chair, Cuff Size: Adult Large)  Pulse 82  Temp 98.2  F (36.8  C) (Oral)  Resp 14  Ht 5' 11\" (1.803 m)  Wt 187 lb 8 oz (85 kg)  SpO2 96%  BMI 26.15 kg/m2  Kamilla Hollins CMA    "

## 2018-08-02 ENCOUNTER — THERAPY VISIT (OUTPATIENT)
Dept: PHYSICAL THERAPY | Facility: CLINIC | Age: 73
End: 2018-08-02
Attending: INTERNAL MEDICINE
Payer: MEDICARE

## 2018-08-02 DIAGNOSIS — M54.50 LUMBAGO: Primary | ICD-10-CM

## 2018-08-02 PROCEDURE — G8979 MOBILITY GOAL STATUS: HCPCS | Mod: GP | Performed by: PHYSICAL THERAPIST

## 2018-08-02 PROCEDURE — 97110 THERAPEUTIC EXERCISES: CPT | Mod: GP | Performed by: PHYSICAL THERAPIST

## 2018-08-02 PROCEDURE — G8978 MOBILITY CURRENT STATUS: HCPCS | Mod: GP | Performed by: PHYSICAL THERAPIST

## 2018-08-02 PROCEDURE — 97161 PT EVAL LOW COMPLEX 20 MIN: CPT | Mod: GP | Performed by: PHYSICAL THERAPIST

## 2018-08-02 NOTE — LETTER
DEPARTMENT OF HEALTH AND HUMAN SERVICES  CENTERS FOR MEDICARE & MEDICAID SERVICES    PLAN/UPDATED PLAN OF PROGRESS FOR OUTPATIENT REHABILITATION    PATIENTS NAME:  Nilesh Dos Santos   : 1945  PROVIDER NUMBER:    [id number]  [id number]   PROVIDER NAME: [facility] CURRAN PT  MEDICAL RECORD NUMBER: [id number]   START OF CARE DATE:  SOC Date: 18   TYPE:  PT  PRIMARY/TREATMENT DIAGNOSIS:Lumbago  VISITS FROM START OF CARE:  Rxs Used: 1     [facility] Initial Evaluation  Subjective:  Patient is a 73 year old male presenting with rehab back hpi. The history is provided by the patient. No  was used.   Nilesh Dos Santos is a 73 year old male with a lumbar condition.  Condition occurred with:  Insidious onset.  Condition occurred: for unknown reasons.  This is a chronic condition  Patient reports a gradual onset of low back pain beginning about 3-5 years ago.  Patient c/o difficulty standing, bending, walking, and lifting. Patient denies any radicular symptoms.  Physical therapy was ordered 2018.    Patient reports pain:  Lumbar spine left and lumbar spine right.  Radiates to:  No radiation.  Pain is described as aching and is constant and reported as 3/10.  Associated symptoms:  Loss of motion/stiffness. Pain is worse in the A.M..  Symptoms are exacerbated by bending, standing, walking and lifting and relieved by NSAID's and other (reclining).  Since onset symptoms are unchanged.  Special testing: none.  Previous treatment: none.    General health as reported by patient is fair (patient feels fatigued).  Pertinent medical history includes:  None.  Medical allergies: no.  Other surgeries include:  Other (kidney stones).  Current medications:  Other.  Current occupation is Retired.      Barriers include:  None as reported by the patient.  Red flags:  None as reported by the patient.    Objective:  Gait:    Gait Type:  Normal     Flexibility/Screens:   Lower  Extremity:  Decreased left lower extremity flexibility:Hip Flexors; Quadriceps and Hamstrings  Decreased right lower extremity flexibility:  Hip Flexors; Quadriceps and Hamstrings  Lumbar/SI Evaluation  ROM:    AROM Lumbar:   Flexion:        Mod Loss (+/-)  Ext:                    Mod Loss (-) - Minimal increased ROM with repeated  movement in standing and with press-up   Side Bend:        Left:     Right:   Rotation:           Left:     Right:   Side Glide:        Left:     Right:   Strength: Fair core strength  Lumbar Myotomes:  normal  PATIENTS NAME:  Nilesh Dos Santos   : 1945      Neural Tension/Mobility:    Left side:SLR or SLR w/DF  negative.   Right side:   SLR w/DF or SLR  negative.   Spinal Segmental Conclusions: Multi-segmental hypomobility    Assessment/Plan:    Patient is a 73 year old male with lumbar complaints.    Patient has the following significant findings with corresponding treatment plan.                Diagnosis 1:  Lumbar dysfunction  Pain -  self management, education, directional preference exercise and home program  Decreased ROM/flexibility - therapeutic exercise, therapeutic activity and home program  Decreased joint mobility - therapeutic exercise, therapeutic activity and home program  Decreased strength - therapeutic exercise, therapeutic activities and home program  Impaired muscle performance - neuro re-education and home program  Decreased function - therapeutic activities and home program  Impaired posture - neuro re-education, therapeutic activities and home program    Therapy Evaluation Codes:   1) History comprised of:   Personal factors that impact the plan of care:      None.    Comorbidity factors that impact the plan of care are:      None.     Medications impacting care: None.  2) Examination of Body Systems comprised of:   Body structures and functions that impact the plan of care:      Lumbar spine.   Activity limitations that impact the plan of care are:   "    Bending, Lifting, Standing and Walking.  3) Clinical presentation characteristics are:   Stable/Uncomplicated.  4) Decision-Making    Low complexity using standardized patient assessment instrument and/or measureable assessment of functional outcome.  Cumulative Therapy Evaluation is: Low complexity.    Previous and current functional limitations:  (See Goal Flow Sheet for this information)    Short term and Long term goals: (See Goal Flow Sheet for this information)     Communication ability:  Patient appears to be able to clearly communicate and understand verbal and written communication and follow directions correctly.  Treatment Explanation - The following has been discussed with the patient:   RX ordered/plan of care  Anticipated outcomes  Possible risks and side effects  This patient would benefit from PT intervention to resume normal activities.   Rehab potential is good.      PATIENTS NAME:  Nielsh Dos Santos   : 1945      Frequency:  1 X week, once daily  Duration:  for 6 weeks  Discharge Plan:  Achieve all LTG.  Independent in home treatment program.  Reach maximal therapeutic benefit.      Caregiver Signature/Credentials _____________________________ Date ________       Treating Provider: Aldo Diaz PT     I have reviewed and certified the need for these services and plan of treatment while under my care.        PHYSICIAN'S SIGNATURE:   __________________________________  Date___________     Natasha Syed MD    Certification period:  Beginning of Cert date period: 18 to  End of Cert period date: 10/30/18     Functional Level Progress Report: Please see attached \"Goal Flow sheet for Functional level.\"    ____X____ Continue Services or       ________ DC Services                Service dates: From  SOC Date: 18 date to present                         "

## 2018-08-02 NOTE — PROGRESS NOTES
[facility] Initial Evaluation  Subjective:  Patient is a 73 year old male presenting with rehab back hpi. The history is provided by the patient. No  was used.   Nilesh Dos Santos is a 73 year old male with a lumbar condition.  Condition occurred with:  Insidious onset.  Condition occurred: for unknown reasons.  This is a chronic condition  Patient reports a gradual onset of low back pain beginning about 3-5 years ago.  Patient c/o difficulty standing, bending, walking, and lifting. Patient denies any radicular symptoms.  Physical therapy was ordered 7/31/2018.    Patient reports pain:  Lumbar spine left and lumbar spine right.  Radiates to:  No radiation.  Pain is described as aching and is constant and reported as 3/10.  Associated symptoms:  Loss of motion/stiffness. Pain is worse in the A.M..  Symptoms are exacerbated by bending, standing, walking and lifting and relieved by NSAID's and other (reclining).  Since onset symptoms are unchanged.  Special testing: none.  Previous treatment: none.    General health as reported by patient is fair (patient feels fatigued).  Pertinent medical history includes:  None.  Medical allergies: no.  Other surgeries include:  Other (kidney stones).  Current medications:  Other.  Current occupation is Retired.        Barriers include:  None as reported by the patient.    Red flags:  None as reported by the patient.                        Objective:    Gait:    Gait Type:  Normal         Flexibility/Screens:       Lower Extremity:  Decreased left lower extremity flexibility:Hip Flexors; Quadriceps and Hamstrings    Decreased right lower extremity flexibility:  Hip Flexors; Quadriceps and Hamstrings               Lumbar/SI Evaluation  ROM:    AROM Lumbar:   Flexion:        Mod Loss (+/-)  Ext:                    Mod Loss (-) - Minimal increased ROM with repeated  movement in standing and with press-up   Side Bend:        Left:     Right:    Rotation:           Left:     Right:   Side Glide:        Left:     Right:         Strength: Fair core strength  Lumbar Myotomes:  normal                  Neural Tension/Mobility:      Left side:SLR or SLR w/DF  negative.     Right side:   SLR w/DF or SLR  negative.         Spinal Segmental Conclusions: Multi-segmental hypomobility                                                       General     ROS    Assessment/Plan:    Patient is a 73 year old male with lumbar complaints.    Patient has the following significant findings with corresponding treatment plan.                Diagnosis 1:  Lumbar dysfunction  Pain -  self management, education, directional preference exercise and home program  Decreased ROM/flexibility - therapeutic exercise, therapeutic activity and home program  Decreased joint mobility - therapeutic exercise, therapeutic activity and home program  Decreased strength - therapeutic exercise, therapeutic activities and home program  Impaired muscle performance - neuro re-education and home program  Decreased function - therapeutic activities and home program  Impaired posture - neuro re-education, therapeutic activities and home program    Therapy Evaluation Codes:   1) History comprised of:   Personal factors that impact the plan of care:      None.    Comorbidity factors that impact the plan of care are:      None.     Medications impacting care: None.  2) Examination of Body Systems comprised of:   Body structures and functions that impact the plan of care:      Lumbar spine.   Activity limitations that impact the plan of care are:      Bending, Lifting, Standing and Walking.  3) Clinical presentation characteristics are:   Stable/Uncomplicated.  4) Decision-Making    Low complexity using standardized patient assessment instrument and/or measureable assessment of functional outcome.  Cumulative Therapy Evaluation is: Low complexity.    Previous and current functional limitations:  (See Goal Flow  Sheet for this information)    Short term and Long term goals: (See Goal Flow Sheet for this information)     Communication ability:  Patient appears to be able to clearly communicate and understand verbal and written communication and follow directions correctly.  Treatment Explanation - The following has been discussed with the patient:   RX ordered/plan of care  Anticipated outcomes  Possible risks and side effects  This patient would benefit from PT intervention to resume normal activities.   Rehab potential is good.    Frequency:  1 X week, once daily  Duration:  for 6 weeks  Discharge Plan:  Achieve all LTG.  Independent in home treatment program.  Reach maximal therapeutic benefit.    Please refer to the daily flowsheet for treatment today, total treatment time and time spent performing 1:1 timed codes.

## 2018-08-07 ENCOUNTER — TELEPHONE (OUTPATIENT)
Dept: INTERNAL MEDICINE | Facility: CLINIC | Age: 73
End: 2018-08-07

## 2018-08-07 ENCOUNTER — THERAPY VISIT (OUTPATIENT)
Dept: PHYSICAL THERAPY | Facility: CLINIC | Age: 73
End: 2018-08-07
Attending: INTERNAL MEDICINE
Payer: MEDICARE

## 2018-08-07 DIAGNOSIS — M54.50 LUMBAGO: ICD-10-CM

## 2018-08-07 PROCEDURE — 97110 THERAPEUTIC EXERCISES: CPT | Mod: GP | Performed by: PHYSICAL THERAPIST

## 2018-08-07 PROCEDURE — 97530 THERAPEUTIC ACTIVITIES: CPT | Mod: GP | Performed by: PHYSICAL THERAPIST

## 2018-08-07 NOTE — MR AVS SNAPSHOT
After Visit Summary   8/7/2018    Nilesh Dos Santos    MRN: [id number]           Patient Information     Date Of Birth          1945        Visit Information        Provider Department      8/7/2018 1:30 PM Amy Michelle, PT [facility]        Today's Diagnoses     Lumbago           Follow-ups after your visit        Your next 10 appointments already scheduled     Aug 14, 2018 10:10 AM CDT   [facility] with CHELSEA Sanchez PT ([facility]  )    [address]  [address]  [facility] [id number] [phone]            Aug 22, 2018 10:30 AM CDT   New Patient Visit with Raphael Tanner MD   [facility] ([facility])    [address]  [address]  [facility] [address] [phone]              Who to contact     If you have questions or need follow up information about today's clinic visit or your schedule please contact [facility] directly at [phone].  Normal or non-critical lab and imaging results will be communicated to you by [employer], letter or phone within 4 business days after the clinic has received the results. If you do not hear from us within 7 days, please contact the clinic through [employer] or phone. If you have a critical or abnormal lab result, we will notify you by phone as soon as possible.  Submit refill requests through [employer] or call your pharmacy and they will forward the refill request to us. Please allow 3 business days for your refill to be completed.          Additional Information About Your Visit        [employer] Information     [employer] gives you secure access to your electronic health record. If you see a primary care provider, you can also send messages to your care team and make appointments. If you have questions, please call your primary care clinic.  If you do not have a primary care provider, please call [phone] and they will assist  you.        Care EveryWhere ID     This is your Care EveryWhere ID. This could be used by other organizations to access your [facility] medical records  [id number]         Blood Pressure from Last 3 Encounters:   07/31/18 120/68   05/29/18 126/79   05/04/18 140/80    Weight from Last 3 Encounters:   07/31/18 85 kg (187 lb 8 oz)   05/04/18 87.4 kg (192 lb 9.6 oz)   04/19/18 88.5 kg (195 lb)              We Performed the Following     THERAPEUTIC ACTIVITIES     THERAPEUTIC EXERCISES        Primary Care Provider Office Phone # Fax #    Natasha Syed [phone]       [address]  [facility] [address]        Equal Access to Services     AUBREE PENA : Hadii aad ku hadasho Soomaali, waaxda luqadaha, qaybta kaalmada adeegyada, waxay idiin hayoskarn keyla ross . So [facility] [phone].    ATENCIÓN: Si habla español, tiene a abbasi disposición servicios gratuitos de asistencia lingüística. [facility] [phone].    We comply with applicable federal civil rights laws and [place] laws. We do not discriminate on the basis of race, color, national origin, age, disability, sex, sexual orientation, or gender identity.            Thank you!     Thank you for choosing [facility]   for your care. Our goal is always to provide you with excellent care. Hearing back from our patients is one way we can continue to improve our services. Please take a few minutes to complete the written survey that you may receive in the mail after your visit with us. Thank you!             Your Updated Medication List - Protect others around you: Learn how to safely use, store and throw away your medicines at www.disposemymeds.org.          This list is accurate as of 8/7/18  3:30 PM.  Always use your most recent med list.                   Brand Name Dispense Instructions for use Diagnosis    albuterol 108 (90 Base) MCG/ACT Inhaler    PROAIR HFA/PROVENTIL HFA/VENTOLIN HFA    1 Inhaler    Inhale 2  puffs into the lungs every 6 hours as needed for shortness of breath / dyspnea or wheezing    Chronic cough       ALEVE 220 MG tablet   Generic drug:  naproxen sodium      Take 220 mg by mouth as needed for moderate pain        aspirin 81 MG tablet      Take by mouth daily        ciclopirox 8 % Soln     3 Bottle    Apply to adjacent skin and affected nails daily.  Remove with alcohol every 7 days, then repeat.    Onychomycosis       coenzyme Q-10 200 MG Caps      Take 200 mg by mouth daily        COMPRESSION STOCKINGS     2 each    1 each daily    Varicose veins of lower extremities with other complications       DiphenhydrAMINE HCl (Sleep) 50 MG Tabs     14 tablet    Take 50 mg by mouth nightly as needed        fluticasone 50 MCG/ACT spray    FLONASE    3 Bottle    Spray 1-2 sprays into both nostrils daily    Chronic vasomotor rhinitis       hydrochlorothiazide 25 MG tablet    HYDRODIURIL    90 tablet    Take 1 tablet (25 mg) by mouth daily    Essential hypertension, benign       ibuprofen 200 MG capsule     120 capsule    Take 400 mg by mouth every 4 hours as needed for fever        ipratropium 0.03 % spray    ATROVENT    1 Box    Spray 2 sprays into both nostrils 3 times daily as needed for rhinitis    Chronic rhinitis, unspecified type       montelukast 10 MG tablet    SINGULAIR    90 tablet    Take 1 tablet (10 mg) by mouth nightly as needed    Cough       NITROSTAT 0.4 MG sublingual tablet   Generic drug:  nitroGLYcerin      Place under the tongue every 5 minutes as needed        potassium chloride SA 20 MEQ CR tablet    K-DUR/KLOR-CON M    90 tablet    Take 1 tablet (20 mEq) by mouth as needed for potassium supplementation    Essential hypertension       rosuvastatin 40 MG tablet    CRESTOR    90 tablet    Take 1 tablet (40 mg) by mouth daily    Mixed hyperlipidemia

## 2018-08-22 ENCOUNTER — OFFICE VISIT (OUTPATIENT)
Dept: UROLOGY | Facility: CLINIC | Age: 73
End: 2018-08-22
Payer: COMMERCIAL

## 2018-08-22 VITALS — HEIGHT: 71 IN | OXYGEN SATURATION: 98 % | BODY MASS INDEX: 26.18 KG/M2 | WEIGHT: 187 LBS | HEART RATE: 64 BPM

## 2018-08-22 DIAGNOSIS — R31.29 MICROHEMATURIA: ICD-10-CM

## 2018-08-22 DIAGNOSIS — R35.0 URINARY FREQUENCY: Primary | ICD-10-CM

## 2018-08-22 DIAGNOSIS — N20.0 CALCULUS OF KIDNEY: ICD-10-CM

## 2018-08-22 LAB
ALBUMIN UR-MCNC: >=300 MG/DL
AMORPH CRY #/AREA URNS HPF: ABNORMAL /HPF
APPEARANCE UR: CLEAR
BACTERIA #/AREA URNS HPF: ABNORMAL /HPF
BILIRUB UR QL STRIP: NEGATIVE
CAOX CRY #/AREA URNS HPF: ABNORMAL /HPF
COLOR UR AUTO: YELLOW
GLUCOSE UR STRIP-MCNC: NEGATIVE MG/DL
GRAN CASTS #/AREA URNS LPF: 4 /LPF
HGB UR QL STRIP: ABNORMAL
KETONES UR STRIP-MCNC: NEGATIVE MG/DL
LEUKOCYTE ESTERASE UR QL STRIP: ABNORMAL
MUCOUS THREADS #/AREA URNS LPF: PRESENT /LPF
NITRATE UR QL: NEGATIVE
PH UR STRIP: 6 PH (ref 5–7)
RBC #/AREA URNS AUTO: 13 /HPF (ref 0–2)
RESIDUAL VOLUME (RV) (EXTERNAL): 57
SOURCE: ABNORMAL
SP GR UR STRIP: >1.03 (ref 1–1.03)
SQUAMOUS #/AREA URNS AUTO: 1 /HPF (ref 0–1)
UROBILINOGEN UR STRIP-ACNC: 1 EU/DL (ref 0.2–1)
WBC #/AREA URNS AUTO: 27 /HPF (ref 0–5)

## 2018-08-22 PROCEDURE — 99204 OFFICE O/P NEW MOD 45 MIN: CPT | Mod: 25 | Performed by: UROLOGY

## 2018-08-22 PROCEDURE — 81001 URINALYSIS AUTO W/SCOPE: CPT | Performed by: UROLOGY

## 2018-08-22 PROCEDURE — 51798 US URINE CAPACITY MEASURE: CPT | Performed by: UROLOGY

## 2018-08-22 ASSESSMENT — PAIN SCALES - GENERAL: PAINLEVEL: MILD PAIN (2)

## 2018-08-22 NOTE — NURSING NOTE
pvr by scanner=57  freq, urg, R testicle pain, groin tenderness R side  Pt up 2-3 times a nt.  Pt denies dysuria or gross hem.  Pt unable to void at this time.  Pt has a HX of stones.  MIKE Holley, CMA

## 2018-08-22 NOTE — PROGRESS NOTES
"[facility]  Main Office: [address]  [address]  [place] [address]       CHIEF COMPLAINT:  History of kidney stones, frequency and urgency, right testicular pain    HISTORY:   I was asked by Dr Arias to see this 73 year old gentleman with a prior history of kidney stones. He reports having large stones over 10 years ago that were removed percutaneously at the [place]. He has had no further kidney stone follow-up since then. He reports a chronic right-sided testicular pain. He says this has been present for many years. There are no inciting or alleviating factors. He also reports urinary frequency and urgency. He gets up 2 or 3 times per night to urinate. He has frequency and urgency during the day as well      PAST MEDICAL HISTORY:   Past Medical History:   Diagnosis Date     CAD (coronary artery disease)     diffuse CAD      Chest pain     chronic stable     Chronic stable angina (H)      Contact dermatitis and other eczema, due to unspecified cause      DEPRESSIVE DISORDER NEC      Dyslipidemia      Fam hx-cardiovas dis NEC     Mother and Father     HTN (hypertension)      IRRITABLE COLON      Mild aortic stenosis      Personal history of urinary calculi      Skin cancer, basal cell 2008     SOB (shortness of breath)      Varicose veins        PAST SURGICAL HISTORY:   Past Surgical History:   Procedure Laterality Date     ANGIOGRAM  11/17/2015    Med Tx, no flow limiting lesions     C URETER ENDOSCOPY THRU URETEROSTOMY REMV FB OR CALCULUS  2001    dr de la garza     COLONOSCOPY Left 5/29/2018    Procedure: COLONOSCOPY;  colonoscopy (fv)  ;  Surgeon: Nilesh Cervantes MD;  Location:  GI     EYE EXAM ESTABLISHED PT  2009     HC COLONOSCOPY THRU STOMA, DIAGNOSTIC  2005     HC EXCISE VARICOCELE      \"cautery\" through intra venous approach     HC KNEE SCOPE, DIAGNOSTIC  2004    Arthroscopy, Knee/left knee      HC REPAIR ING HERNIA,5+Y/O,REDUCIBL  1990's    Inguinal Hernia Repair  Right     HC " VASECTOMY UNILAT/BILAT W POSTOP SEMEN      Vasectomy     TEST NOT FOUND      Per cut removal of L kidney stone       FAMILY HISTORY:   Family History   Problem Relation Age of Onset     HEART DISEASE Mother      91     Cancer - colorectal Mother      HEART DISEASE Father       70s     Diabetes Maternal Aunt      Alzheimer Disease No family hx of      Cancer No family hx of      Prostate Cancer No family hx of        SOCIAL HISTORY:   Social History   Substance Use Topics     Smoking status: Former Smoker     Types: Cigars     Quit date: 1975     Smokeless tobacco: Never Used      Comment: 2-3 cigars     Alcohol use 2.0 oz/week     4 Glasses of wine per week      Comment: 1-2 glass of wine daily          Allergies   Allergen Reactions     Amoxicillin      severe rash     Contrast Dye Hives     Patient states this was years ago and he believes he has had dye since that time without problems.         Current Outpatient Prescriptions:      aspirin 81 MG tablet, Take by mouth daily, Disp: , Rfl:      ciclopirox 8 % SOLN, Apply to adjacent skin and affected nails daily.  Remove with alcohol every 7 days, then repeat., Disp: 3 Bottle, Rfl: 3     coenzyme Q-10 200 MG CAPS, Take 200 mg by mouth daily, Disp: , Rfl:      DiphenhydrAMINE HCl, Sleep, 50 MG TABS, Take 50 mg by mouth nightly as needed, Disp: 14 tablet, Rfl:      fluticasone (FLONASE) 50 MCG/ACT spray, Spray 1-2 sprays into both nostrils daily, Disp: 3 Bottle, Rfl: 3     hydrochlorothiazide (HYDRODIURIL) 25 MG tablet, Take 1 tablet (25 mg) by mouth daily, Disp: 90 tablet, Rfl: 3     ibuprofen 200 MG capsule, Take 400 mg by mouth every 4 hours as needed for fever, Disp: 120 capsule, Rfl:      ipratropium (ATROVENT) 0.03 % spray, Spray 2 sprays into both nostrils 3 times daily as needed for rhinitis, Disp: 1 Box, Rfl: 1     montelukast (SINGULAIR) 10 MG tablet, Take 1 tablet (10 mg) by mouth nightly as needed, Disp: 90 tablet, Rfl: 3     naproxen sodium  "(ALEVE) 220 MG tablet, Take 220 mg by mouth as needed for moderate pain, Disp: , Rfl:      potassium chloride SA (K-DUR/KLOR-CON M) 20 MEQ CR tablet, Take 1 tablet (20 mEq) by mouth as needed for potassium supplementation, Disp: 90 tablet, Rfl: 1     rosuvastatin (CRESTOR) 40 MG tablet, Take 1 tablet (40 mg) by mouth daily, Disp: 90 tablet, Rfl: 3     albuterol (PROAIR HFA/PROVENTIL HFA/VENTOLIN HFA) 108 (90 Base) MCG/ACT Inhaler, Inhale 2 puffs into the lungs every 6 hours as needed for shortness of breath / dyspnea or wheezing, Disp: 1 Inhaler, Rfl: 0     COMPRESSION STOCKINGS, 1 each daily (Patient not taking: Reported on 8/22/2018), Disp: 2 each, Rfl: 4     nitroglycerin (NITROSTAT) 0.4 MG SL tablet, Place under the tongue every 5 minutes as needed, Disp: , Rfl:     Review Of Systems:  Skin: negative  Eyes: negative  Ears/Nose/Throat: negative  Respiratory: No shortness of breath, dyspnea on exertion, cough, or hemoptysis  Cardiovascular: negative  Gastrointestinal: negative  Genitourinary: negative  Musculoskeletal: negative  Neurologic: negative  Psychiatric: negative  Hematologic/Lymphatic/Immunologic: negative  Endocrine: negative      PHYSICAL EXAM:    Pulse 64  Ht 1.803 m (5' 11\")  Wt 84.8 kg (187 lb)  SpO2 98%  BMI 26.08 kg/m2  General appearance: In NAD, conversant  HEENT: Normocephalic and atraumatic, anicteric sclera  Cardiovascular: Not examined  Respiratory: normal, non-labored breathing  Gastrointestinal: negative, Abdomen soft, non-tender, and non-distended.   Musculoskeletal: Not Examined  Peripheral Vascular/extremity: No peripheral edema  Skin: Normal temperature, turgor, and texture. No rash  Psychiatric: Appropriate affect, alert and oriented to person, place, and time    Penis: Normal  Scrotal skin: Normal, no lesions  Testicles: Normal to palpation bilaterally  Epididymis: Normal to palpation bilaterally  Lymphatic: Normal inguinal lymph nodes  Digital Rectal Exam: the prostate is " slightly enlarged, benign and symmetric to palpation    Cystoscopy: Not done      PSA:     UA RESULTS:  Recent Labs   Lab Test  09/26/13   0840   COLOR  Yellow   APPEARANCE  Clear   URINEGLC  Negative   URINEBILI  Negative   URINEKETONE  Negative   SG  1.018   UBLD  Negative   URINEPH  5.5   PROTEIN  Negative   NITRITE  Negative   LEUKEST  Negative   RBCU  1   WBCU  5*       Bladder Scan: 57mL postvoid    Other Labs:      Imaging Studies: None      CLINICAL IMPRESSION:   History of stones, microscopic hematuria, frequency and urgency    PLAN:   He has a history of kidney stones and now a recurrence of microscopic hematuria. I recommended we evaluate him with a KUB to look for stones. He agreed to the plan. We discussed possible causes of his urinary symptoms, stones would be included in the differential. I recommended that we wait until we get his KUB results before addressing the urinary issues. I will contact him with results. He will also need a PSA checked in order to complete his workup in the future.      Raphael Tanner MD

## 2018-08-22 NOTE — MR AVS SNAPSHOT
After Visit Summary   8/22/2018    Nilesh Dos Santos    MRN: [id number]           Patient Information     Date Of Birth          1945        Visit Information        Provider Department      8/22/2018 10:30 AM Raphael Tanner MD [facility] [place]        Today's Diagnoses     Urinary frequency    -  1    Calculus of kidney           Follow-ups after your visit        Follow-up notes from your care team     Return for KUB, I will call with results.      Your next 10 appointments already scheduled     Aug 28, 2018  8:45 AM CDT   [facility]   [facility]    [address]  [facility] [id number] [phone]           Please bring any scans or X-rays taken at other hospitals, if similar tests were done. Also bring a list of your medicines, including vitamins, minerals and over-the-counter drugs. It is safest to leave personal items at home.  Be sure to tell your doctor:   If you have any allergies.   If there s any chance you are pregnant.   If you are breastfeeding.  You do not need to do anything special to prepare for this exam.  Please wear loose clothing, such as a sweat suit or jogging clothes. Avoid snaps, zippers and other metal. We may ask you to undress and put on a hospital gown.            Aug 28, 2018  9:00 AM CDT   XR KUB with RSCCXR1   [facility] ([facility])    [address]  [place] [id number] [phone]           Please bring a list of your current medicines to your exam. (Include vitamins, minerals and over-thecounter medicines.) Leave your valuables at home.  Tell your doctor if there is a chance you may be pregnant.  You do not need to do anything special for this exam.              Future tests that were ordered for you today     Open Future Orders        Priority  "Expected Expires Ordered    XR KUB Routine 8/22/2018 8/22/2019 8/22/2018            Who to contact     If you have questions or need follow up information about today's clinic visit or your schedule please contact [facility] directly at [phone].  Normal or non-critical lab and imaging results will be communicated to you by MyChart, letter or phone within 4 business days after the clinic has received the results. If you do not hear from us within 7 days, please contact the clinic through [employer] or phone. If you have a critical or abnormal lab result, we will notify you by phone as soon as possible.  Submit refill requests through [employer] or call your pharmacy and they will forward the refill request to us. Please allow 3 business days for your refill to be completed.          Additional Information About Your Visit        MyChart Information     [employer] gives you secure access to your electronic health record. If you see a primary care provider, you can also send messages to your care team and make appointments. If you have questions, please call your primary care clinic.  If you do not have a primary care provider, please call [phone] and they will assist you.        Care EveryWhere ID     This is your Care EveryWhere ID. This could be used by other organizations to access your [facility] medical records  [id number]        Your Vitals Were     Pulse Height Pulse Oximetry BMI (Body Mass Index)          64 1.803 m (5' 11\") 98% 26.08 kg/m2         Blood Pressure from Last 3 Encounters:   07/31/18 120/68   05/29/18 126/79   05/04/18 140/80    Weight from Last 3 Encounters:   08/22/18 84.8 kg (187 lb)   07/31/18 85 kg (187 lb 8 oz)   05/04/18 87.4 kg (192 lb 9.6 oz)              We Performed the Following     Bladder scan        Primary Care Provider Office Phone # Fax #    Natasha ySed [phone]       [address] " [facility] [address]        Equal Access to Services     AUBREE PENA : Hadii damián prasad bekahponcho Solesviaali, waaxda luqadaha, qaybta adarshconstancewali márquez. So [facility] [phone].    ATENCIÓN: Si habla español, tiene a abbasi disposición servicios gratuitos de asistencia lingüística. Danaame al [phone].    We comply with applicable federal civil rights laws and [place] laws. We do not discriminate on the basis of race, color, national origin, age, disability, sex, sexual orientation, or gender identity.            Thank you!     Thank you for choosing [facility] [place]  for your care. Our goal is always to provide you with excellent care. Hearing back from our patients is one way we can continue to improve our services. Please take a few minutes to complete the written survey that you may receive in the mail after your visit with us. Thank you!             Your Updated Medication List - Protect others around you: Learn how to safely use, store and throw away your medicines at www.disposemymeds.org.          This list is accurate as of 8/22/18 11:11 AM.  Always use your most recent med list.                   Brand Name Dispense Instructions for use Diagnosis    albuterol 108 (90 Base) MCG/ACT inhaler    PROAIR HFA/PROVENTIL HFA/VENTOLIN HFA    1 Inhaler    Inhale 2 puffs into the lungs every 6 hours as needed for shortness of breath / dyspnea or wheezing    Chronic cough       ALEVE 220 MG tablet   Generic drug:  naproxen sodium      Take 220 mg by mouth as needed for moderate pain        aspirin 81 MG tablet      Take by mouth daily        ciclopirox 8 % Soln     3 Bottle    Apply to adjacent skin and affected nails daily.  Remove with alcohol every 7 days, then repeat.    Onychomycosis       coenzyme Q-10 200 MG Caps      Take 200 mg by mouth daily        COMPRESSION STOCKINGS     2 each    1 each daily    Varicose veins of lower  extremities with other complications       DiphenhydrAMINE HCl (Sleep) 50 MG Tabs     14 tablet    Take 50 mg by mouth nightly as needed        fluticasone 50 MCG/ACT spray    FLONASE    3 Bottle    Spray 1-2 sprays into both nostrils daily    Chronic vasomotor rhinitis       hydrochlorothiazide 25 MG tablet    HYDRODIURIL    90 tablet    Take 1 tablet (25 mg) by mouth daily    Essential hypertension, benign       ibuprofen 200 MG capsule     120 capsule    Take 400 mg by mouth every 4 hours as needed for fever        ipratropium 0.03 % spray    ATROVENT    1 Box    Spray 2 sprays into both nostrils 3 times daily as needed for rhinitis    Chronic rhinitis, unspecified type       montelukast 10 MG tablet    SINGULAIR    90 tablet    Take 1 tablet (10 mg) by mouth nightly as needed    Cough       NITROSTAT 0.4 MG sublingual tablet   Generic drug:  nitroGLYcerin      Place under the tongue every 5 minutes as needed        potassium chloride SA 20 MEQ CR tablet    K-DUR/KLOR-CON M    90 tablet    Take 1 tablet (20 mEq) by mouth as needed for potassium supplementation    Essential hypertension       rosuvastatin 40 MG tablet    CRESTOR    90 tablet    Take 1 tablet (40 mg) by mouth daily    Mixed hyperlipidemia

## 2018-08-22 NOTE — LETTER
8/22/2018       RE: Nilesh SUAZO Raya  [address] [place] [address]     Dear Colleague,    Thank you for referring your patient, Nilesh Dos Santos, to the [facility] at [facility]. Please see a copy of my visit note below.    [facility]  Main Office: [address]  [address]  [place] [address]       CHIEF COMPLAINT:  History of kidney stones, frequency and urgency, right testicular pain    HISTORY:   I was asked by Dr Arias to see this 73 year old gentleman with a prior history of kidney stones. He reports having large stones over 10 years ago that were removed percutaneously at the [facility]. He has had no further kidney stone follow-up since then. He reports a chronic right-sided testicular pain. He says this has been present for many years. There are no inciting or alleviating factors. He also reports urinary frequency and urgency. He gets up 2 or 3 times per night to urinate. He has frequency and urgency during the day as well      PAST MEDICAL HISTORY:   Past Medical History:   Diagnosis Date     CAD (coronary artery disease)     diffuse CAD      Chest pain     chronic stable     Chronic stable angina (H)      Contact dermatitis and other eczema, due to unspecified cause      DEPRESSIVE DISORDER NEC      Dyslipidemia      Fam hx-cardiovas dis NEC     Mother and Father     HTN (hypertension)      IRRITABLE COLON      Mild aortic stenosis      Personal history of urinary calculi      Skin cancer, basal cell 2008     SOB (shortness of breath)      Varicose veins        PAST SURGICAL HISTORY:   Past Surgical History:   Procedure Laterality Date     ANGIOGRAM  11/17/2015    Med Tx, no flow limiting lesions     C URETER ENDOSCOPY THRU URETEROSTOMY REMV FB OR CALCULUS  2001    dr de la garza     COLONOSCOPY Left 5/29/2018    Procedure: COLONOSCOPY;  colonoscopy (fv)  ;  Surgeon: Nilesh Cervantes MD;  Location:  GI  "    EYE EXAM ESTABLISHED PT  2009     HC COLONOSCOPY THRU STOMA, DIAGNOSTIC       HC EXCISE VARICOCELE      \"cautery\" through intra venous approach     HC KNEE SCOPE, DIAGNOSTIC      Arthroscopy, Knee/left knee      HC REPAIR ING HERNIA,5+Y/O,REDUCIBL      Inguinal Hernia Repair  Right     HC VASECTOMY UNILAT/BILAT W POSTOP SEMEN      Vasectomy     TEST NOT FOUND      Per cut removal of L kidney stone       FAMILY HISTORY:   Family History   Problem Relation Age of Onset     HEART DISEASE Mother      91     Cancer - colorectal Mother      HEART DISEASE Father       70s     Diabetes Maternal Aunt      Alzheimer Disease No family hx of      Cancer No family hx of      Prostate Cancer No family hx of        SOCIAL HISTORY:   Social History   Substance Use Topics     Smoking status: Former Smoker     Types: Cigars     Quit date: 1975     Smokeless tobacco: Never Used      Comment: 2-3 cigars     Alcohol use 2.0 oz/week     4 Glasses of wine per week      Comment: 1-2 glass of wine daily          Allergies   Allergen Reactions     Amoxicillin      severe rash     Contrast Dye Hives     Patient states this was years ago and he believes he has had dye since that time without problems.         Current Outpatient Prescriptions:      aspirin 81 MG tablet, Take by mouth daily, Disp: , Rfl:      ciclopirox 8 % SOLN, Apply to adjacent skin and affected nails daily.  Remove with alcohol every 7 days, then repeat., Disp: 3 Bottle, Rfl: 3     coenzyme Q-10 200 MG CAPS, Take 200 mg by mouth daily, Disp: , Rfl:      DiphenhydrAMINE HCl, Sleep, 50 MG TABS, Take 50 mg by mouth nightly as needed, Disp: 14 tablet, Rfl:      fluticasone (FLONASE) 50 MCG/ACT spray, Spray 1-2 sprays into both nostrils daily, Disp: 3 Bottle, Rfl: 3     hydrochlorothiazide (HYDRODIURIL) 25 MG tablet, Take 1 tablet (25 mg) by mouth daily, Disp: 90 tablet, Rfl: 3     ibuprofen 200 MG capsule, Take 400 mg by mouth every 4 hours as " "needed for fever, Disp: 120 capsule, Rfl:      ipratropium (ATROVENT) 0.03 % spray, Spray 2 sprays into both nostrils 3 times daily as needed for rhinitis, Disp: 1 Box, Rfl: 1     montelukast (SINGULAIR) 10 MG tablet, Take 1 tablet (10 mg) by mouth nightly as needed, Disp: 90 tablet, Rfl: 3     naproxen sodium (ALEVE) 220 MG tablet, Take 220 mg by mouth as needed for moderate pain, Disp: , Rfl:      potassium chloride SA (K-DUR/KLOR-CON M) 20 MEQ CR tablet, Take 1 tablet (20 mEq) by mouth as needed for potassium supplementation, Disp: 90 tablet, Rfl: 1     rosuvastatin (CRESTOR) 40 MG tablet, Take 1 tablet (40 mg) by mouth daily, Disp: 90 tablet, Rfl: 3     albuterol (PROAIR HFA/PROVENTIL HFA/VENTOLIN HFA) 108 (90 Base) MCG/ACT Inhaler, Inhale 2 puffs into the lungs every 6 hours as needed for shortness of breath / dyspnea or wheezing, Disp: 1 Inhaler, Rfl: 0     COMPRESSION STOCKINGS, 1 each daily (Patient not taking: Reported on 8/22/2018), Disp: 2 each, Rfl: 4     nitroglycerin (NITROSTAT) 0.4 MG SL tablet, Place under the tongue every 5 minutes as needed, Disp: , Rfl:     PHYSICAL EXAM:    Pulse 64  Ht 1.803 m (5' 11\")  Wt 84.8 kg (187 lb)  SpO2 98%  BMI 26.08 kg/m2  General appearance: In NAD, conversant  HEENT: Normocephalic and atraumatic, anicteric sclera  Cardiovascular: Not examined  Respiratory: normal, non-labored breathing  Gastrointestinal: negative, Abdomen soft, non-tender, and non-distended.   Musculoskeletal: Not Examined  Peripheral Vascular/extremity: No peripheral edema  Skin: Normal temperature, turgor, and texture. No rash  Psychiatric: Appropriate affect, alert and oriented to person, place, and time    Penis: Normal  Scrotal skin: Normal, no lesions  Testicles: Normal to palpation bilaterally  Epididymis: Normal to palpation bilaterally  Lymphatic: Normal inguinal lymph nodes  Digital Rectal Exam: the prostate is slightly enlarged, benign and symmetric to palpation    Cystoscopy: Not " done      PSA:     UA RESULTS:  Recent Labs   Lab Test  09/26/13   0840   COLOR  Yellow   APPEARANCE  Clear   URINEGLC  Negative   URINEBILI  Negative   URINEKETONE  Negative   SG  1.018   UBLD  Negative   URINEPH  5.5   PROTEIN  Negative   NITRITE  Negative   LEUKEST  Negative   RBCU  1   WBCU  5*       Bladder Scan: 57mL postvoid    Other Labs:      Imaging Studies: None      CLINICAL IMPRESSION:   History of stones, microscopic hematuria, frequency and urgency    PLAN:   He has a history of kidney stones and now a recurrence of microscopic hematuria. I recommended we evaluate him with a KUB to look for stones. He agreed to the plan. We discussed possible causes of his urinary symptoms, stones would be included in the differential. I recommended that we wait until we get his KUB results before addressing the urinary issues. I will contact him with results. He will also need a PSA checked in order to complete his workup in the future.          Again, thank you for allowing me to participate in the care of your patient.      Sincerely,    Raphael Tanner MD

## 2018-08-28 ENCOUNTER — HOSPITAL ENCOUNTER (OUTPATIENT)
Dept: GENERAL RADIOLOGY | Facility: CLINIC | Age: 73
Discharge: HOME | End: 2018-08-28
Attending: UROLOGY | Admitting: UROLOGY
Payer: MEDICARE

## 2018-08-28 ENCOUNTER — HOSPITAL ENCOUNTER (OUTPATIENT)
Dept: CT IMAGING | Facility: CLINIC | Age: 73
End: 2018-08-28
Attending: INTERNAL MEDICINE
Payer: MEDICARE

## 2018-08-28 DIAGNOSIS — N20.0 CALCULUS OF KIDNEY: ICD-10-CM

## 2018-08-28 DIAGNOSIS — R05.3 CHRONIC COUGH: ICD-10-CM

## 2018-08-28 DIAGNOSIS — R35.0 URINARY FREQUENCY: ICD-10-CM

## 2018-08-28 PROCEDURE — 74019 RADEX ABDOMEN 2 VIEWS: CPT

## 2018-08-28 PROCEDURE — 71250 CT THORAX DX C-: CPT

## 2018-08-30 ENCOUNTER — TELEPHONE (OUTPATIENT)
Dept: UROLOGY | Facility: CLINIC | Age: 73
End: 2018-08-30

## 2018-08-30 DIAGNOSIS — N40.0 ENLARGED PROSTATE: Primary | ICD-10-CM

## 2018-08-30 NOTE — TELEPHONE ENCOUNTER
Spoke on phone regarding KUB results.  He has multiple stones in the right kidney.  We discussed treatment options including percutaneous removal versus ESWL with stent in place versus ureteroscopy. He will think things over.  He would like to delay any sort of procedure until after the first of the year. He will contact us if there are any symptoms in the interim but otherwise I will see him back in January and we will check his PSA at that time as well.

## 2018-09-17 PROBLEM — M54.50 LUMBAGO: Status: RESOLVED | Noted: 2018-08-02 | Resolved: 2018-09-17

## 2018-09-17 NOTE — PROGRESS NOTES
Subjective:  HPI                    Objective:  System    Physical Exam    General     ROS    Assessment/Plan:    DISCHARGE REPORT    Progress reporting period is from 8-2-18 to 8-7-18.       SUBJECTIVE  Subjective changes noted by patient:  .  Subjective: Does not feel any different today; was doing exercises 2-3x/day  .   Changes in function:  None  Adverse reaction to treatment or activity: None    OBJECTIVE  Changes noted in objective findings:  Patient has failed to return to therapy so current objective findings are unknown.  Objective: Lx ROM: EXT=mod loss, R SG=min to mod loss, L SG=min to mod loss     ASSESSMENT/PLAN  Updated problem list and treatment plan: Diagnosis 1:  Mechanical LBP  Pain -  self management, education, directional preference exercise and home program  Decreased ROM/flexibility - manual therapy and therapeutic exercise  Decreased function - therapeutic activities  STG/LTGs have been met or progress has been made towards goals:  None  Assessment of Progress: The patient has not returned to therapy. Current status is unknown.  Self Management Plans:  Patient has been instructed in a home treatment program.  Patient  has been instructed in self management of symptoms.  I have re-evaluated this patient and find that the nature, scope, duration and intensity of the therapy is appropriate for the medical condition of the patient.  Nilesh continues to require the following intervention to meet STG and LTG's:  PT intervention is no longer required to meet STG/LTG.    Recommendations:  This patient is ready to be discharged from therapy and continue their home treatment program.    Please refer to the daily flowsheet for treatment today, total treatment time and time spent performing 1:1 timed codes.

## 2019-03-26 DIAGNOSIS — I10 ESSENTIAL HYPERTENSION, BENIGN: ICD-10-CM

## 2019-03-26 RX ORDER — HYDROCHLOROTHIAZIDE 25 MG/1
25 TABLET ORAL DAILY
Qty: 90 TABLET | Refills: 0 | Status: SHIPPED | OUTPATIENT
Start: 2019-03-26 | End: 2019-06-03

## 2019-05-21 DIAGNOSIS — I25.10 CORONARY ARTERY DISEASE INVOLVING NATIVE CORONARY ARTERY OF NATIVE HEART WITHOUT ANGINA PECTORIS: Primary | ICD-10-CM

## 2019-05-28 DIAGNOSIS — E78.2 MIXED HYPERLIPIDEMIA: ICD-10-CM

## 2019-05-28 RX ORDER — ROSUVASTATIN CALCIUM 40 MG/1
40 TABLET, COATED ORAL DAILY
Qty: 90 TABLET | Refills: 0 | Status: SHIPPED | OUTPATIENT
Start: 2019-05-28 | End: 2019-08-01

## 2019-06-03 DIAGNOSIS — I10 ESSENTIAL HYPERTENSION, BENIGN: ICD-10-CM

## 2019-06-03 RX ORDER — HYDROCHLOROTHIAZIDE 25 MG/1
25 TABLET ORAL DAILY
Qty: 90 TABLET | Refills: 0 | Status: SHIPPED | OUTPATIENT
Start: 2019-06-03 | End: 2019-08-08

## 2019-06-10 ENCOUNTER — OFFICE VISIT (OUTPATIENT)
Dept: CARDIOLOGY | Facility: CLINIC | Age: 74
End: 2019-06-10
Payer: MEDICARE

## 2019-06-10 VITALS
BODY MASS INDEX: 25.97 KG/M2 | WEIGHT: 185.5 LBS | HEIGHT: 71 IN | DIASTOLIC BLOOD PRESSURE: 70 MMHG | SYSTOLIC BLOOD PRESSURE: 134 MMHG | HEART RATE: 70 BPM | OXYGEN SATURATION: 97 %

## 2019-06-10 DIAGNOSIS — I25.10 CORONARY ARTERY DISEASE INVOLVING NATIVE CORONARY ARTERY OF NATIVE HEART WITHOUT ANGINA PECTORIS: ICD-10-CM

## 2019-06-10 DIAGNOSIS — I10 ESSENTIAL HYPERTENSION: ICD-10-CM

## 2019-06-10 DIAGNOSIS — I25.10 CORONARY ARTERY DISEASE INVOLVING NATIVE CORONARY ARTERY OF NATIVE HEART WITHOUT ANGINA PECTORIS: Primary | ICD-10-CM

## 2019-06-10 LAB
ALT SERPL W P-5'-P-CCNC: <5 U/L (ref 5–30)
ANION GAP SERPL CALCULATED.3IONS-SCNC: ABNORMAL MMOL/L (ref 6–17)
BUN SERPL-MCNC: 20 MG/DL (ref 7–30)
CALCIUM SERPL-MCNC: 9.6 MG/DL (ref 8.5–10.5)
CHLORIDE SERPL-SCNC: 102 MMOL/L (ref 98–107)
CHOLEST SERPL-MCNC: 121 MG/DL
CK SERPL-CCNC: 48 U/L (ref 30–300)
CO2 SERPL-SCNC: 30 MMOL/L (ref 23–29)
CREAT SERPL-MCNC: 1.57 MG/DL (ref 0.7–1.3)
GFR SERPL CREATININE-BSD FRML MDRD: 43 ML/MIN/{1.73_M2}
GLUCOSE SERPL-MCNC: 116 MG/DL (ref 70–105)
HDLC SERPL-MCNC: 51 MG/DL
LDLC SERPL CALC-MCNC: 53 MG/DL
NONHDLC SERPL-MCNC: 70 MG/DL
POTASSIUM SERPL-SCNC: 2.9 MMOL/L (ref 3.5–5.1)
SODIUM SERPL-SCNC: 140 MMOL/L (ref 136–145)
TRIGL SERPL-MCNC: 84 MG/DL

## 2019-06-10 PROCEDURE — 82550 ASSAY OF CK (CPK): CPT | Performed by: INTERNAL MEDICINE

## 2019-06-10 PROCEDURE — 99214 OFFICE O/P EST MOD 30 MIN: CPT | Performed by: INTERNAL MEDICINE

## 2019-06-10 PROCEDURE — 36415 COLL VENOUS BLD VENIPUNCTURE: CPT | Performed by: INTERNAL MEDICINE

## 2019-06-10 PROCEDURE — 80061 LIPID PANEL: CPT | Performed by: INTERNAL MEDICINE

## 2019-06-10 PROCEDURE — 84460 ALANINE AMINO (ALT) (SGPT): CPT | Performed by: INTERNAL MEDICINE

## 2019-06-10 PROCEDURE — 80048 BASIC METABOLIC PNL TOTAL CA: CPT | Performed by: INTERNAL MEDICINE

## 2019-06-10 RX ORDER — POTASSIUM CHLORIDE 1500 MG/1
20 TABLET, EXTENDED RELEASE ORAL DAILY
Qty: 90 TABLET | Refills: 1 | Status: SHIPPED | OUTPATIENT
Start: 2019-06-10 | End: 2020-08-20

## 2019-06-10 ASSESSMENT — MIFFLIN-ST. JEOR: SCORE: 1603.55

## 2019-06-10 NOTE — PROGRESS NOTES
HPI and Plan:   See dictation(#[id number])    Orders Placed This Encounter   Procedures     CK total     Basic metabolic panel     Follow-Up with Cardiologist       Orders Placed This Encounter   Medications     potassium chloride ER (K-DUR/KLOR-CON M) 20 MEQ CR tablet     Sig: Take 1 tablet (20 mEq) by mouth daily     Dispense:  90 tablet     Refill:  1       Medications Discontinued During This Encounter   Medication Reason     potassium chloride SA (K-DUR/KLOR-CON M) 20 MEQ CR tablet Reorder         Encounter Diagnoses   Name Primary?     Coronary artery disease involving native coronary artery of native heart without angina pectoris Yes     Essential hypertension        CURRENT MEDICATIONS:  Current Outpatient Medications   Medication Sig Dispense Refill     albuterol (PROAIR HFA/PROVENTIL HFA/VENTOLIN HFA) 108 (90 Base) MCG/ACT Inhaler Inhale 2 puffs into the lungs every 6 hours as needed for shortness of breath / dyspnea or wheezing 1 Inhaler 0     aspirin 81 MG tablet Take by mouth daily       coenzyme Q-10 200 MG CAPS Take 200 mg by mouth daily       COMPRESSION STOCKINGS 1 each daily 2 each 4     fluticasone (FLONASE) 50 MCG/ACT spray Spray 1-2 sprays into both nostrils daily 3 Bottle 3     hydrochlorothiazide (HYDRODIURIL) 25 MG tablet Take 1 tablet (25 mg) by mouth daily 90 tablet 0     ibuprofen 200 MG capsule Take 400 mg by mouth every 4 hours as needed for fever 120 capsule      nitroglycerin (NITROSTAT) 0.4 MG SL tablet Place under the tongue every 5 minutes as needed       potassium chloride ER (K-DUR/KLOR-CON M) 20 MEQ CR tablet Take 1 tablet (20 mEq) by mouth daily 90 tablet 1     rosuvastatin (CRESTOR) 40 MG tablet Take 1 tablet (40 mg) by mouth daily 90 tablet 0     ciclopirox 8 % SOLN Apply to adjacent skin and affected nails daily.  Remove with alcohol every 7 days, then repeat. (Patient not taking: Reported on 6/10/2019) 3 Bottle 3     DiphenhydrAMINE HCl, Sleep, 50 MG TABS Take 50 mg by mouth  "nightly as needed 14 tablet      ipratropium (ATROVENT) 0.03 % spray Spray 2 sprays into both nostrils 3 times daily as needed for rhinitis (Patient not taking: Reported on 6/10/2019) 1 Box 1     montelukast (SINGULAIR) 10 MG tablet Take 1 tablet (10 mg) by mouth nightly as needed 90 tablet 3     naproxen sodium (ALEVE) 220 MG tablet Take 220 mg by mouth as needed for moderate pain         ALLERGIES     Allergies   Allergen Reactions     Amoxicillin      severe rash     Contrast Dye Hives     Patient states this was years ago and he believes he has had dye since that time without problems.     Imdur [Isosorbide] Other (See Comments)     headache     Lopressor [Metoprolol] Fatigue       PAST MEDICAL HISTORY:  Past Medical History:   Diagnosis Date     CAD (coronary artery disease)     cardiac cath 2015: non-obstructive diffuse CAD      Chest pain     chronic stable     Chronic stable angina (H)      Contact dermatitis and other eczema, due to unspecified cause      DEPRESSIVE DISORDER NEC      Dyslipidemia      Fam hx-cardiovas dis NEC     Mother and Father     Hernia, abdominal      HTN (hypertension)      IRRITABLE COLON      Mild aortic stenosis      Mumps      Personal history of urinary calculi      Polyneuropathy      Skin cancer, basal cell 2008     SOB (shortness of breath)      Varicose veins        PAST SURGICAL HISTORY:  Past Surgical History:   Procedure Laterality Date     ANGIOGRAM  11/17/2015    Med Tx, no flow limiting lesions     C URETER ENDOSCOPY THRU URETEROSTOMY REMV FB OR CALCULUS  2001    dr de la garza     COLONOSCOPY Left 5/29/2018    Procedure: COLONOSCOPY;  colonoscopy (fv)  ;  Surgeon: Nilesh Cervantes MD;  Location:  GI     CYSTOSCOPY       EYE EXAM ESTABLISHED PT  2009     HC COLONOSCOPY THRU STOMA, DIAGNOSTIC  2005     HC EXCISE VARICOCELE      \"cautery\" through intra venous approach     HC KNEE SCOPE, DIAGNOSTIC  2004    Arthroscopy, Knee/left knee      HC REPAIR ING " HERNIA,5+Y/O,REDUCIBL      Inguinal Hernia Repair  Right     HC VASECTOMY UNILAT/BILAT W POSTOP SEMEN      Vasectomy     TEST NOT FOUND      Per cut removal of L kidney stone     TESTICLE SURGERY       VASECTOMY         FAMILY HISTORY:  Family History   Problem Relation Age of Onset     Heart Disease Mother         91     Cancer - colorectal Mother      Heart Disease Father          70s     Diabetes Maternal Aunt      Alzheimer Disease No family hx of      Cancer No family hx of      Prostate Cancer No family hx of        SOCIAL HISTORY:  Social History     Socioeconomic History     Marital status:      Spouse name: Velia     Number of children: 2     Years of education: 18     Highest education level: None   Occupational History     Occupation: Consultant     Employer: SELF   Social Needs     Financial resource strain: None     Food insecurity:     Worry: None     Inability: None     Transportation needs:     Medical: None     Non-medical: None   Tobacco Use     Smoking status: Former Smoker     Types: Cigars     Last attempt to quit: 1975     Years since quittin.4     Smokeless tobacco: Never Used     Tobacco comment: 2-3 cigars   Substance and Sexual Activity     Alcohol use: Yes     Alcohol/week: 2.0 oz     Types: 4 Glasses of wine per week     Comment: 1-2 glass of wine daily     Drug use: No     Sexual activity: Yes     Partners: Female     Birth control/protection: Surgical     Comment: vas   Lifestyle     Physical activity:     Days per week: None     Minutes per session: None     Stress: None   Relationships     Social connections:     Talks on phone: None     Gets together: None     Attends [employer] service: None     Active member of club or organization: None     Attends meetings of clubs or organizations: None     Relationship status: None     Intimate partner violence:     Fear of current or ex partner: None     Emotionally abused: None     Physically abused: None      "Forced sexual activity: None   Other Topics Concern      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 Not Asked   Social History Narrative     None       Review of Systems:  Skin:  Negative bruising skin is under control - seeing a Dermatologist   Eyes:  Positive for glasses dry & scratchy   ENT:  Positive for sinus trouble;postnasal drainage;nasal congestion    Respiratory:  Negative       Cardiovascular:    fatigue;Positive for;dizziness upper left chest discomfort  Gastroenterology: Negative      Genitourinary:  Negative urinary frequency    Musculoskeletal:  Positive for arthritis muscle pain  Neurologic:  Positive for numbness or tingling of feet numbness in toes   Psychiatric:  Positive for depression chronic   Heme/Lymph/Imm:  Positive for easy bruising    Endocrine:  Negative        Physical Exam:  Vitals: /70   Pulse 70   Ht 1.803 m (5' 11\")   Wt 84.1 kg (185 lb 8 oz)   SpO2 97%   BMI 25.87 kg/m      Constitutional:           Skin:             Head:           Eyes:           Lymph:      ENT:           Neck:           Respiratory:            Cardiac:                                                           GI:           Extremities and Muscular Skeletal:                 Neurological:           Psych:             CC  No referring provider defined for this encounter.                  "

## 2019-06-10 NOTE — LETTER
6/10/2019      Natasha Syed MD  [address] [facility] [address]      RE: Nilesh Dos Santos       Dear Colleague,    I had the pleasure of seeing Nilesh Dos Santos in the [facility].    Service Date: 06/10/2019      REASON FOR CLINIC VISIT:  Followup CAD.      HISTORY OF PRESENT ILLNESS:  Mr. Dos Santos is a very pleasant 74-year-old gentleman with history of chronic stable angina in the past, with coronary CT angiogram in 2013 showing diffuse coronary artery calcification and then subsequent coronary angiogram in 2015 when he was found to have moderate stenosis involving the RPDA and posterolateral branch with FFR of 0.9 and 0.94, respectively, indicating they were not flow-limiting lesions.  He also has history of hypertension and is on hydrochlorothiazide.  He has also history of hypokalemia in the past when he was not on potassium supplementation.      Today he is coming for routine followup.  He has no specific cardiac complaints.  He can sometimes get aches and pains in the muscles and the joints which is longstanding, and for this remarkably he has been taking ibuprofen at least 400 mg every day.  In additionally, he is on baby aspirin.  He is on Crestor 40 mg daily.  Lipid panel today shows LDL well controlled at 53, ALT is less than 5.  Remarkably BMP today shows potassium of 2.9.  This was 3.3 last year.  Creatinine has increased to 1.57, it was 1.2-1.25 in the past.  Echocardiograms in the past have shown normal LV function.  With physical activity, the patient feels fine.  He does not have a chest discomfort or tightness like he was having in the past when he was walking or doing yard work or lawn work.      PHYSICAL EXAMINATION:   VITAL SIGNS:  Blood pressure 134/70, heart rate 70 regular, weight 185 pounds, BMI 25.87.   GENERAL:  The patient appears pleasant, comfortable.   NECK:  Normal JVP, no bruit.   CARDIOVASCULAR SYSTEM:  S1, S2 normal, no murmur, rub  or gallop.   RESPIRATORY SYSTEM:  Clear to auscultation bilaterally.   GASTROINTESTINAL SYSTEM:  Abdomen soft, nontender.   EXTREMITIES:  No pitting pedal edema.   NEUROLOGICAL:  Alert, oriented x3.   PSYCH:  Normal affect.   SKIN:  No obvious rash.   HEENT:  No pallor or icterus.      IMPRESSION AND PLAN:  A very pleasant 74-year-old gentleman with history of CAD, with history of chronic stable angina, now not having any anginal symptoms, with history of hypertension on hydrochlorothiazide, with history of hypokalemia in the past when he was not on potassium supplementation.  To be noted, the patient was supposed to be on 20 mEq daily of potassium supplementation, but he has not been taking it.  Remarkably BMP today shows hypokalemia and worsening of renal functions.  He is also consuming a significant amount of ibuprofen every day for generalized aches and pains.  At this time, I recommend that patient stopped taking ibuprofen as I am worried about impacting kidney functions.  He will need potassium supplementation.  I have renewed his prescription of 20 mEq daily of oral potassium.  He does have history of kidney stones and has seen Dr. Tanner, urologist, in the past with a KUB x-ray showing right kidney stones.  One possibility is that they may be impacting also kidney functions.  I do recommend the patient to see her primary care physician, Dr. Mis Rothman, in next 1-2 weeks with a followup BMP to make sure that the kidney functions are stable or coming down and the potassium has improved.  I would also add CK to the lab today to make sure there is no active myositis going on in the setting of patient on high-intensity statin, although my clinical suspicion for that is quite low.  He will continue baby aspirin.  To be noted, in the past beta blocker had to be discontinued because of fatigue and poor sleep, and also Imdur had to be discontinued because of side effect.  If he continues to remain stable cardiac  status-wise, we can see him back in 1 year, sooner if he notes any change in clinical status, especially any exertion-related symptoms.      1.  CAD with history of chronic stable angina, now without any anginal symptoms.  Details as noted above.  Normal LV function.   2.  Hypertension, on hydrochlorothiazide.   3.  History of kidney stones and hypokalemia as noted above.  Patient taking significant amount of ibuprofen as noted above.      RECOMMENDATIONS:   1.  Advised patient to discontinue all NSAIDs/ibuprofen as I am worried it may be impacting kidney function.   2.  Potassium supplementation 20 mEq daily as he is having hypokalemia likely due to hydrochlorothiazide.   3.  Continue aspirin, Crestor.   4.  Check CK.  This will be an add-on lab to the labs already done today.   5.  Follow up with primary care physician in next 1-2 weeks and would need a repeat BMP at that time to make sure creatinine is stable and potassium has come within normal range.  The patient will probably also need a followup with his urologist at some point.  In case kidney functions do not improve, he may need other workup, but I will defer this to his primary care physician -- for example, a renal ultrasound.   6.  Follow up in Cardiology Clinic in 1 year, sooner if he notes any change in clinical status, especially if any exertion-related symptoms                cc:   Mis Rothman MD    [facility]    [address]    [place]  [address]       Raphael Tanner MD    [facility]    [address] [place] [address], [address]    [place]  [address]         SHAUNA BLOCK MD             D: 06/10/2019   T: 06/10/2019   MT: CELESTE      Name:     MIRA MACHADO   MRN:      [id number]        Account:      [id number]   :      1945           Service Date: 06/10/2019      Document: [id number]         Outpatient Encounter Medications as of 6/10/2019   Medication Sig Dispense Refill     albuterol  (PROAIR HFA/PROVENTIL HFA/VENTOLIN HFA) 108 (90 Base) MCG/ACT Inhaler Inhale 2 puffs into the lungs every 6 hours as needed for shortness of breath / dyspnea or wheezing 1 Inhaler 0     aspirin 81 MG tablet Take by mouth daily       coenzyme Q-10 200 MG CAPS Take 200 mg by mouth daily       COMPRESSION STOCKINGS 1 each daily 2 each 4     fluticasone (FLONASE) 50 MCG/ACT spray Spray 1-2 sprays into both nostrils daily 3 Bottle 3     hydrochlorothiazide (HYDRODIURIL) 25 MG tablet Take 1 tablet (25 mg) by mouth daily 90 tablet 0     ibuprofen 200 MG capsule Take 400 mg by mouth every 4 hours as needed for fever 120 capsule      nitroglycerin (NITROSTAT) 0.4 MG SL tablet Place under the tongue every 5 minutes as needed       potassium chloride ER (K-DUR/KLOR-CON M) 20 MEQ CR tablet Take 1 tablet (20 mEq) by mouth daily 90 tablet 1     rosuvastatin (CRESTOR) 40 MG tablet Take 1 tablet (40 mg) by mouth daily 90 tablet 0     ciclopirox 8 % SOLN Apply to adjacent skin and affected nails daily.  Remove with alcohol every 7 days, then repeat. (Patient not taking: Reported on 6/10/2019) 3 Bottle 3     DiphenhydrAMINE HCl, Sleep, 50 MG TABS Take 50 mg by mouth nightly as needed 14 tablet      ipratropium (ATROVENT) 0.03 % spray Spray 2 sprays into both nostrils 3 times daily as needed for rhinitis (Patient not taking: Reported on 6/10/2019) 1 Box 1     montelukast (SINGULAIR) 10 MG tablet Take 1 tablet (10 mg) by mouth nightly as needed 90 tablet 3     naproxen sodium (ALEVE) 220 MG tablet Take 220 mg by mouth as needed for moderate pain       [DISCONTINUED] potassium chloride SA (K-DUR/KLOR-CON M) 20 MEQ CR tablet Take 1 tablet (20 mEq) by mouth as needed for potassium supplementation (Patient not taking: Reported on 6/10/2019) 90 tablet 1     No facility-administered encounter medications on file as of 6/10/2019.        Again, thank you for allowing me to participate in the care of your patient.      Sincerely,    Bharat  MD Adolfo     [facility]

## 2019-06-10 NOTE — PROGRESS NOTES
Service Date: 06/10/2019      REASON FOR CLINIC VISIT:  Followup CAD.      HISTORY OF PRESENT ILLNESS:  Mr. Dos Santos is a very pleasant 74-year-old gentleman with history of chronic stable angina in the past, with coronary CT angiogram in 2013 showing diffuse coronary artery calcification and then subsequent coronary angiogram in 2015 when he was found to have moderate stenosis involving the RPDA and posterolateral branch with FFR of 0.9 and 0.94, respectively, indicating they were not flow-limiting lesions.  He also has history of hypertension and is on hydrochlorothiazide.  He has also history of hypokalemia in the past when he was not on potassium supplementation.      Today he is coming for routine followup.  He has no specific cardiac complaints.  He can sometimes get aches and pains in the muscles and the joints which is longstanding, and for this remarkably he has been taking ibuprofen at least 400 mg every day.  In additionally, he is on baby aspirin.  He is on Crestor 40 mg daily.  Lipid panel today shows LDL well controlled at 53, ALT is less than 5.  Remarkably BMP today shows potassium of 2.9.  This was 3.3 last year.  Creatinine has increased to 1.57, it was 1.2-1.25 in the past.  Echocardiograms in the past have shown normal LV function.  With physical activity, the patient feels fine.  He does not have a chest discomfort or tightness like he was having in the past when he was walking or doing yard work or lawn work.      PHYSICAL EXAMINATION:   VITAL SIGNS:  Blood pressure 134/70, heart rate 70 regular, weight 185 pounds, BMI 25.87.   GENERAL:  The patient appears pleasant, comfortable.   NECK:  Normal JVP, no bruit.   CARDIOVASCULAR SYSTEM:  S1, S2 normal, no murmur, rub or gallop.   RESPIRATORY SYSTEM:  Clear to auscultation bilaterally.   GASTROINTESTINAL SYSTEM:  Abdomen soft, nontender.   EXTREMITIES:  No pitting pedal edema.   NEUROLOGICAL:  Alert, oriented x3.   PSYCH:  Normal affect.   SKIN:  No  obvious rash.   HEENT:  No pallor or icterus.      IMPRESSION AND PLAN:  A very pleasant 74-year-old gentleman with history of CAD, with history of chronic stable angina, now not having any anginal symptoms, with history of hypertension on hydrochlorothiazide, with history of hypokalemia in the past when he was not on potassium supplementation.  To be noted, the patient was supposed to be on 20 mEq daily of potassium supplementation, but he has not been taking it.  Remarkably BMP today shows hypokalemia and worsening of renal functions.  He is also consuming a significant amount of ibuprofen every day for generalized aches and pains.  At this time, I recommend that patient stopped taking ibuprofen as I am worried about impacting kidney functions.  He will need potassium supplementation.  I have renewed his prescription of 20 mEq daily of oral potassium.  He does have history of kidney stones and has seen Dr. Tanner, urologist, in the past with a KUB x-ray showing right kidney stones.  One possibility is that they may be impacting also kidney functions.  I do recommend the patient to see her primary care physician, Dr. Mis Rothman, in next 1-2 weeks with a followup BMP to make sure that the kidney functions are stable or coming down and the potassium has improved.  I would also add CK to the lab today to make sure there is no active myositis going on in the setting of patient on high-intensity statin, although my clinical suspicion for that is quite low.  He will continue baby aspirin.  To be noted, in the past beta blocker had to be discontinued because of fatigue and poor sleep, and also Imdur had to be discontinued because of side effect.  If he continues to remain stable cardiac status-wise, we can see him back in 1 year, sooner if he notes any change in clinical status, especially any exertion-related symptoms.      1.  CAD with history of chronic stable angina, now without any anginal symptoms.  Details as  noted above.  Normal LV function.   2.  Hypertension, on hydrochlorothiazide.   3.  History of kidney stones and hypokalemia as noted above.  Patient taking significant amount of ibuprofen as noted above.      RECOMMENDATIONS:   1.  Advised patient to discontinue all NSAIDs/ibuprofen as I am worried it may be impacting kidney function.   2.  Potassium supplementation 20 mEq daily as he is having hypokalemia likely due to hydrochlorothiazide.   3.  Continue aspirin, Crestor.   4.  Check CK.  This will be an add-on lab to the labs already done today.   5.  Follow up with primary care physician in next 1-2 weeks and would need a repeat BMP at that time to make sure creatinine is stable and potassium has come within normal range.  The patient will probably also need a followup with his urologist at some point.  In case kidney functions do not improve, he may need other workup, but I will defer this to his primary care physician -- for example, a renal ultrasound.   6.  Follow up in Cardiology Clinic in 1 year, sooner if he notes any change in clinical status, especially if any exertion-related symptoms        cc:   Mis Rothman MD    [facility]    [address]    [place]  [address]       Raphael Tanner MD    [facility]    [address] [place] [address], [address]    [place]  [address]         SHAUNA BLOCK MD             D: 06/10/2019   T: 06/10/2019   MT: CELESTE      Name:     MIRA MACHADO   MRN:      [id number]        Account:      [id number]   :      1945           Service Date: 06/10/2019      Document: [id number]

## 2019-06-10 NOTE — LETTER
6/10/2019    Natasha Syed MD  [address] [facility] [address]    RE: Nilesh Dos Santos       Dear Colleague,    I had the pleasure of seeing Nilesh Dos Santos in the [facility].    HPI and Plan:   See dictation(#[id number])    Orders Placed This Encounter   Procedures     CK total     Basic metabolic panel     Follow-Up with Cardiologist       Orders Placed This Encounter   Medications     potassium chloride ER (K-DUR/KLOR-CON M) 20 MEQ CR tablet     Sig: Take 1 tablet (20 mEq) by mouth daily     Dispense:  90 tablet     Refill:  1       Medications Discontinued During This Encounter   Medication Reason     potassium chloride SA (K-DUR/KLOR-CON M) 20 MEQ CR tablet Reorder         Encounter Diagnoses   Name Primary?     Coronary artery disease involving native coronary artery of native heart without angina pectoris Yes     Essential hypertension        CURRENT MEDICATIONS:  Current Outpatient Medications   Medication Sig Dispense Refill     albuterol (PROAIR HFA/PROVENTIL HFA/VENTOLIN HFA) 108 (90 Base) MCG/ACT Inhaler Inhale 2 puffs into the lungs every 6 hours as needed for shortness of breath / dyspnea or wheezing 1 Inhaler 0     aspirin 81 MG tablet Take by mouth daily       coenzyme Q-10 200 MG CAPS Take 200 mg by mouth daily       COMPRESSION STOCKINGS 1 each daily 2 each 4     fluticasone (FLONASE) 50 MCG/ACT spray Spray 1-2 sprays into both nostrils daily 3 Bottle 3     hydrochlorothiazide (HYDRODIURIL) 25 MG tablet Take 1 tablet (25 mg) by mouth daily 90 tablet 0     ibuprofen 200 MG capsule Take 400 mg by mouth every 4 hours as needed for fever 120 capsule      nitroglycerin (NITROSTAT) 0.4 MG SL tablet Place under the tongue every 5 minutes as needed       potassium chloride ER (K-DUR/KLOR-CON M) 20 MEQ CR tablet Take 1 tablet (20 mEq) by mouth daily 90 tablet 1     rosuvastatin (CRESTOR) 40 MG tablet Take 1 tablet (40 mg) by mouth daily 90 tablet 0      ciclopirox 8 % SOLN Apply to adjacent skin and affected nails daily.  Remove with alcohol every 7 days, then repeat. (Patient not taking: Reported on 6/10/2019) 3 Bottle 3     DiphenhydrAMINE HCl, Sleep, 50 MG TABS Take 50 mg by mouth nightly as needed 14 tablet      ipratropium (ATROVENT) 0.03 % spray Spray 2 sprays into both nostrils 3 times daily as needed for rhinitis (Patient not taking: Reported on 6/10/2019) 1 Box 1     montelukast (SINGULAIR) 10 MG tablet Take 1 tablet (10 mg) by mouth nightly as needed 90 tablet 3     naproxen sodium (ALEVE) 220 MG tablet Take 220 mg by mouth as needed for moderate pain         ALLERGIES     Allergies   Allergen Reactions     Amoxicillin      severe rash     Contrast Dye Hives     Patient states this was years ago and he believes he has had dye since that time without problems.     Imdur [Isosorbide] Other (See Comments)     headache     Lopressor [Metoprolol] Fatigue       PAST MEDICAL HISTORY:  Past Medical History:   Diagnosis Date     CAD (coronary artery disease)     cardiac cath 2015: non-obstructive diffuse CAD      Chest pain     chronic stable     Chronic stable angina (H)      Contact dermatitis and other eczema, due to unspecified cause      DEPRESSIVE DISORDER NEC      Dyslipidemia      Fam hx-cardiovas dis NEC     Mother and Father     Hernia, abdominal      HTN (hypertension)      IRRITABLE COLON      Mild aortic stenosis      Mumps      Personal history of urinary calculi      Polyneuropathy      Skin cancer, basal cell 2008     SOB (shortness of breath)      Varicose veins        PAST SURGICAL HISTORY:  Past Surgical History:   Procedure Laterality Date     ANGIOGRAM  11/17/2015    Med Tx, no flow limiting lesions     C URETER ENDOSCOPY THRU URETEROSTOMY REMV FB OR CALCULUS  2001    dr de la garza     COLONOSCOPY Left 5/29/2018    Procedure: COLONOSCOPY;  colonoscopy (fv)  ;  Surgeon: Nilesh Cervantes MD;  Location:  GI     CYSTOSCOPY       EYE EXAM  "ESTABLISHED PT  2009     HC COLONOSCOPY THRU STOMA, DIAGNOSTIC       HC EXCISE VARICOCELE      \"cautery\" through intra venous approach     HC KNEE SCOPE, DIAGNOSTIC      Arthroscopy, Knee/left knee      HC REPAIR ING HERNIA,5+Y/O,REDUCIBL      Inguinal Hernia Repair  Right     HC VASECTOMY UNILAT/BILAT W POSTOP SEMEN      Vasectomy     TEST NOT FOUND      Per cut removal of L kidney stone     TESTICLE SURGERY       VASECTOMY         FAMILY HISTORY:  Family History   Problem Relation Age of Onset     Heart Disease Mother         91     Cancer - colorectal Mother      Heart Disease Father          70s     Diabetes Maternal Aunt      Alzheimer Disease No family hx of      Cancer No family hx of      Prostate Cancer No family hx of        SOCIAL HISTORY:  Social History     Socioeconomic History     Marital status:      Spouse name: Velia     Number of children: 2     Years of education: 18     Highest education level: None   Occupational History     Occupation: Consultant     Employer: SELF   Social Needs     Financial resource strain: None     Food insecurity:     Worry: None     Inability: None     Transportation needs:     Medical: None     Non-medical: None   Tobacco Use     Smoking status: Former Smoker     Types: Cigars     Last attempt to quit: 1975     Years since quittin.4     Smokeless tobacco: Never Used     Tobacco comment: 2-3 cigars   Substance and Sexual Activity     Alcohol use: Yes     Alcohol/week: 2.0 oz     Types: 4 Glasses of wine per week     Comment: 1-2 glass of wine daily     Drug use: No     Sexual activity: Yes     Partners: Female     Birth control/protection: Surgical     Comment: vas   Lifestyle     Physical activity:     Days per week: None     Minutes per session: None     Stress: None   Relationships     Social connections:     Talks on phone: None     Gets together: None     Attends Hindu service: None     Active member of club or organization: " "None     Attends meetings of clubs or organizations: None     Relationship status: None     Intimate partner violence:     Fear of current or ex partner: None     Emotionally abused: None     Physically abused: None     Forced sexual activity: None   Other Topics Concern      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 Not Asked   Social History Narrative     None       Review of Systems:  Skin:  Negative bruising skin is under control - seeing a Dermatologist   Eyes:  Positive for glasses dry & scratchy   ENT:  Positive for sinus trouble;postnasal drainage;nasal congestion    Respiratory:  Negative       Cardiovascular:    fatigue;Positive for;dizziness upper left chest discomfort  Gastroenterology: Negative      Genitourinary:  Negative urinary frequency    Musculoskeletal:  Positive for arthritis muscle pain  Neurologic:  Positive for numbness or tingling of feet numbness in toes   Psychiatric:  Positive for depression chronic   Heme/Lymph/Imm:  Positive for easy bruising    Endocrine:  Negative        Physical Exam:  Vitals: /70   Pulse 70   Ht 1.803 m (5' 11\")   Wt 84.1 kg (185 lb 8 oz)   SpO2 97%   BMI 25.87 kg/m       Constitutional:           Skin:             Head:           Eyes:           Lymph:      ENT:           Neck:           Respiratory:            Cardiac:                                                           GI:           Extremities and Muscular Skeletal:                 Neurological:           Psych:             CC  No referring provider defined for this " encounter.                    Thank you for allowing me to participate in the care of your patient.      Sincerely,     Bharat Mata MD     [facility]    cc:   No referring provider defined for this encounter.

## 2019-08-01 DIAGNOSIS — E78.2 MIXED HYPERLIPIDEMIA: ICD-10-CM

## 2019-08-01 RX ORDER — ROSUVASTATIN CALCIUM 40 MG/1
40 TABLET, COATED ORAL DAILY
Qty: 90 TABLET | Refills: 2 | Status: SHIPPED | OUTPATIENT
Start: 2019-08-01 | End: 2020-03-11

## 2019-08-08 DIAGNOSIS — I10 ESSENTIAL HYPERTENSION, BENIGN: ICD-10-CM

## 2019-08-08 RX ORDER — HYDROCHLOROTHIAZIDE 25 MG/1
25 TABLET ORAL DAILY
Qty: 90 TABLET | Refills: 3 | Status: SHIPPED | OUTPATIENT
Start: 2019-08-08 | End: 2020-05-29

## 2019-09-27 ENCOUNTER — HEALTH MAINTENANCE LETTER (OUTPATIENT)
Age: 74
End: 2019-09-27

## 2020-03-11 DIAGNOSIS — E78.2 MIXED HYPERLIPIDEMIA: ICD-10-CM

## 2020-03-11 RX ORDER — ROSUVASTATIN CALCIUM 40 MG/1
40 TABLET, COATED ORAL DAILY
Qty: 90 TABLET | Refills: 1 | Status: SHIPPED | OUTPATIENT
Start: 2020-03-11 | End: 2020-08-28

## 2020-03-15 ENCOUNTER — HEALTH MAINTENANCE LETTER (OUTPATIENT)
Age: 75
End: 2020-03-15

## 2020-05-29 DIAGNOSIS — I10 ESSENTIAL HYPERTENSION, BENIGN: ICD-10-CM

## 2020-05-29 RX ORDER — HYDROCHLOROTHIAZIDE 25 MG/1
25 TABLET ORAL DAILY
Qty: 90 TABLET | Refills: 0 | Status: SHIPPED | OUTPATIENT
Start: 2020-05-29 | End: 2020-09-18

## 2020-06-30 ENCOUNTER — VIRTUAL VISIT (OUTPATIENT)
Dept: CARDIOLOGY | Facility: CLINIC | Age: 75
End: 2020-06-30
Payer: MEDICARE

## 2020-06-30 DIAGNOSIS — I25.10 CORONARY ARTERY DISEASE INVOLVING NATIVE CORONARY ARTERY OF NATIVE HEART WITHOUT ANGINA PECTORIS: Primary | ICD-10-CM

## 2020-06-30 PROCEDURE — 99442 ZZC PHYSICIAN TELEPHONE EVALUATION 11-20 MIN: CPT | Performed by: INTERNAL MEDICINE

## 2020-06-30 NOTE — PROGRESS NOTES
"Nilesh Dos Santos is a 75 year old male who is being evaluated via a billable telephone visit.      The patient has been notified of following:     \"This telephone visit will be conducted via a call between you and your physician/provider. We have found that certain health care needs can be provided without the need for a physical exam.  This service lets us provide the care you need with a short phone conversation.  If a prescription is necessary we can send it directly to your pharmacy.  If lab work is needed we can place an order for that and you can then stop by our lab to have the test done at a later time.    Telephone visits are billed at different rates depending on your insurance coverage. During this emergency period, for some insurers they may be billed the same as an in-person visit.  Please reach out to your insurance provider with any questions.    If during the course of the call the physician/provider feels a telephone visit is not appropriate, you will not be charged for this service.\"    Patient has given verbal consent for Telephone visit?  Yes    What phone number would you like to be contacted at? [phone]    How would you like to obtain your AVS? Braden  Pt reported blood pressure: no bp wt 160#      Review Of Systems  Skin: neg  Eyes:Ears/Nose/Throat: sinus trouble, glasses  Respiratory: sob with exertion  Cardiovascular:fatique  Gastrointestinal: neg  Genitourinary:   Musculoskeletal: back pain, arthritis  Neurologic:neg   Psychiatric: sleep disturbance   Hematologic/Lymphatic/Immunologic: allergies  Endocrine:  neg    Charisse GAYTAN     Mr. Dos Santos is a very pleasant 75-year-old gentleman with history of chronic stable angina in the past, with coronary CT angiogram in 2013 showing diffuse coronary artery calcification and then subsequent coronary angiogram in 2015 when he was found to have moderate stenosis involving the RPDA and posterolateral branch with FFR of 0.9 and 0.94, " respectively, indicating they were not flow-limiting lesions.  He also has history of hypertension and is on hydrochlorothiazide.  He has also history of hypokalemia in the past when he was not on potassium supplementation.  Today's visit is a routine follow-up visit, annual visit which has been converted into a virtual visit due to ongoing coronavirus pandemic and at patient's preference a phone visit.  Patient tells me cardiac health wise he feels fine.  He is still mowing the lawn.  He still gets tired in the middle of the day but if he rests he gets better.  He denies any exertional symptoms like chest discomfort or shortness of breath.  Remarkably when I saw the patient a year ago he had hypokalemia and I recommended patient to have a follow-up BMP with potassium supplementation and see her primary care physician.  His creatinine was also worse than his baseline  Unfortunately patient tells me that he did not follow-up.  Due to chronic back pain and arthritis issues it looks like he is consuming significant amount of ibuprofen sometimes with Aleve and Tylenol.  Echocardiogram in the past have shown normal LV function    Assessment plan  A pleasant 75 gentleman with chronic stable angina, cardiac status wise I think he is doing quite well.  He is on aspirin, high intensity statin Crestor 40 mg daily with well-controlled LDL at 53.  He had not tolerated beta-blocker in the past.  LV function has been normal on previous echocardiogram.  Remarkably he continues to consume significant amount of NSAIDs.  He had worsening of kidney function last year and unfortunately despite recommendation of follow-up he did not follow-up with his primary care physician and had a repeat BMP checked.  I again conveyed my significant concerns to patient about his chronic NSAID consumption which appears to be quite significant in amount and can lead to renal failure, gastritis and can also increase risk of major adverse cardiovascular  events.  I also expressed my concern about patient having no follow-up regarding his BMP and strongly recommend him to see his primary care physician and have renal functions follow-up.  I recommend to cut down on ibuprofen Aleve and other NSAIDs and use Tylenol as needed for pain relief till he sees his primary care physician.  Regarding cardiac status I think by history he is doing well.  In fact last year because of his ongoing back pain and arthritis issues we checked creatinine kinase which was within normal limit.  If he continues to feel stable cardiac status wise we can see him back in 1 year we will repeat an echocardiogram to follow-up on borderline to mildly dilated ascending aorta, of course if he notes any change in clinical status especially exertional into symptoms he should call us and we will see him sooner.    Phone call duration: 14  minutes    Bharat Mata MD

## 2020-06-30 NOTE — LETTER
6/30/2020    Natasha Syed MD  [address] [facility] [address]    RE: Nilesh Dos Santos       Dear Colleague,    I had the pleasure of seeing Nilesh Dos Santos in the [facility].    Nilesh Dos Santos is a 75 year old male who is being evaluated via a billable telephone visit.      Mr. Dos Santos is a very pleasant 75-year-old gentleman with history of chronic stable angina in the past, with coronary CT angiogram in 2013 showing diffuse coronary artery calcification and then subsequent coronary angiogram in 2015 when he was found to have moderate stenosis involving the RPDA and posterolateral branch with FFR of 0.9 and 0.94, respectively, indicating they were not flow-limiting lesions.  He also has history of hypertension and is on hydrochlorothiazide.  He has also history of hypokalemia in the past when he was not on potassium supplementation.  Today's visit is a routine follow-up visit, annual visit which has been converted into a virtual visit due to ongoing coronavirus pandemic and at patient's preference a phone visit.  Patient tells me cardiac health wise he feels fine.  He is still mowing the lawn.  He still gets tired in the middle of the day but if he rests he gets better.  He denies any exertional symptoms like chest discomfort or shortness of breath.  Remarkably when I saw the patient a year ago he had hypokalemia and I recommended patient to have a follow-up BMP with potassium supplementation and see her primary care physician.  His creatinine was also worse than his baseline  Unfortunately patient tells me that he did not follow-up.  Due to chronic back pain and arthritis issues it looks like he is consuming significant amount of ibuprofen sometimes with Aleve and Tylenol.  Echocardiogram in the past have shown normal LV function    Assessment plan  A pleasant 75 gentleman with chronic stable angina, cardiac status wise I think he is doing quite well.  He is on  aspirin, high intensity statin Crestor 40 mg daily with well-controlled LDL at 53.  He had not tolerated beta-blocker in the past.  LV function has been normal on previous echocardiogram.  Remarkably he continues to consume significant amount of NSAIDs.  He had worsening of kidney function last year and unfortunately despite recommendation of follow-up he did not follow-up with his primary care physician and had a repeat BMP checked.  I again conveyed my significant concerns to patient about his chronic NSAID consumption which appears to be quite significant in amount and can lead to renal failure, gastritis and can also increase risk of major adverse cardiovascular events.  I also expressed my concern about patient having no follow-up regarding his BMP and strongly recommend him to see his primary care physician and have renal functions follow-up.  I recommend to cut down on ibuprofen Aleve and other NSAIDs and use Tylenol as needed for pain relief till he sees his primary care physician.  Regarding cardiac status I think by history he is doing well.  In fact last year because of his ongoing back pain and arthritis issues we checked creatinine kinase which was within normal limit.  If he continues to feel stable cardiac status wise we can see him back in 1 year we will repeat an echocardiogram to follow-up on borderline to mildly dilated ascending aorta, of course if he notes any change in clinical status especially exertional into symptoms he should call us and we will see him sooner.    Phone call duration: 14  minutes    Thank you for allowing me to participate in the care of your patient.      Sincerely,     Bharat Mata MD     [facility]

## 2020-08-20 ENCOUNTER — OFFICE VISIT (OUTPATIENT)
Dept: FAMILY MEDICINE | Facility: CLINIC | Age: 75
End: 2020-08-20

## 2020-08-20 VITALS
HEART RATE: 75 BPM | TEMPERATURE: 97.8 F | OXYGEN SATURATION: 99 % | HEIGHT: 71 IN | WEIGHT: 179.4 LBS | SYSTOLIC BLOOD PRESSURE: 128 MMHG | DIASTOLIC BLOOD PRESSURE: 74 MMHG | BODY MASS INDEX: 25.11 KG/M2 | RESPIRATION RATE: 18 BRPM

## 2020-08-20 DIAGNOSIS — Z12.5 SCREENING FOR PROSTATE CANCER: ICD-10-CM

## 2020-08-20 DIAGNOSIS — Z79.899 ENCOUNTER FOR LONG-TERM (CURRENT) USE OF MEDICATIONS: ICD-10-CM

## 2020-08-20 DIAGNOSIS — I10 ESSENTIAL HYPERTENSION, BENIGN: ICD-10-CM

## 2020-08-20 DIAGNOSIS — I83.893 VARICOSE VEINS OF BOTH LOWER EXTREMITIES WITH COMPLICATIONS: ICD-10-CM

## 2020-08-20 DIAGNOSIS — I25.10 CORONARY ARTERY DISEASE INVOLVING NATIVE CORONARY ARTERY OF NATIVE HEART WITHOUT ANGINA PECTORIS: ICD-10-CM

## 2020-08-20 DIAGNOSIS — K40.90 UNILATERAL INGUINAL HERNIA WITHOUT OBSTRUCTION OR GANGRENE, RECURRENCE NOT SPECIFIED: ICD-10-CM

## 2020-08-20 DIAGNOSIS — E78.2 MIXED HYPERLIPIDEMIA: ICD-10-CM

## 2020-08-20 DIAGNOSIS — N18.30 CKD (CHRONIC KIDNEY DISEASE) STAGE 3, GFR 30-59 ML/MIN (H): ICD-10-CM

## 2020-08-20 DIAGNOSIS — Z00.00 ROUTINE HISTORY AND PHYSICAL EXAMINATION OF ADULT: Primary | ICD-10-CM

## 2020-08-20 DIAGNOSIS — R35.0 FREQUENT URINATION: ICD-10-CM

## 2020-08-20 DIAGNOSIS — N20.0 CALCULUS OF KIDNEY: ICD-10-CM

## 2020-08-20 DIAGNOSIS — J30.9 ALLERGIC RHINITIS, UNSPECIFIED SEASONALITY, UNSPECIFIED TRIGGER: ICD-10-CM

## 2020-08-20 LAB
ALBUMIN (URINE): 30 MG/DL
ALBUMIN SERPL-MCNC: 4.3 G/DL (ref 3.6–5.1)
ALBUMIN/GLOB SERPL: 1.7 {RATIO} (ref 1–2.5)
ALP SERPL-CCNC: 58 U/L (ref 33–130)
ALT 1742-6: 8 U/L (ref 0–32)
APPEARANCE UR: ABNORMAL
AST 1920-8: 9 U/L (ref 0–35)
BACTERIA, UR: ABNORMAL
BILIRUB SERPL-MCNC: 1.1 MG/DL (ref 0.2–1.2)
BILIRUB UR QL: ABNORMAL
BUN SERPL-MCNC: 11 MG/DL (ref 7–25)
BUN/CREATININE RATIO: 9.1 (ref 6–22)
CALCIUM SERPL-MCNC: 10.1 MG/DL (ref 8.6–10.3)
CASTS/LPF: ABNORMAL
CHLORIDE SERPLBLD-SCNC: 101.9 MMOL/L (ref 98–110)
CHOLEST SERPL-MCNC: 121 MG/DL (ref 0–199)
CHOLEST/HDLC SERPL: 2 {RATIO} (ref 0–5)
CO2 SERPL-SCNC: 30.1 MMOL/L (ref 20–32)
COLOR UR: YELLOW
CREAT SERPL-MCNC: 1.21 MG/DL (ref 0.7–1.18)
EP/HPF: ABNORMAL
GFR SERPL CREATININE-BSD FRML MDRD: 58 ML/MIN/1.73M2
GLOBULIN, CALCULATED - QUEST: 2.6 (ref 1.9–3.7)
GLUCOSE SERPL-MCNC: 114 MG/DL (ref 60–99)
GLUCOSE URINE: ABNORMAL MG/DL
HDLC SERPL-MCNC: 51 MG/DL (ref 40–150)
HEMOGLOBIN: 14.7 G/DL (ref 13.3–17.7)
HGB UR QL: ABNORMAL
KETONES UR QL: ABNORMAL MG/DL
LDLC SERPL CALC-MCNC: 52 MG/DL (ref 0–130)
LEUKOCYTE ESTERASE - QUEST: ABNORMAL
MISC.: ABNORMAL
NITRITE UR QL STRIP: ABNORMAL
PH UR STRIP: 7 PH (ref 5–7)
POTASSIUM SERPL-SCNC: 3.48 MMOL/L (ref 3.5–5.3)
PROT SERPL-MCNC: 6.9 G/DL (ref 6.1–8.1)
RBC, UR MICRO: ABNORMAL (ref ?–2)
SODIUM SERPL-SCNC: 141.6 MMOL/L (ref 135–146)
SP. GRAVITY: 1.02
TRIGL SERPL-MCNC: 89 MG/DL (ref 0–149)
UROBILINOGEN UR QL STRIP: 1 EU/DL (ref 0.2–1)
WBC, UR MICRO: ABNORMAL (ref ?–2)

## 2020-08-20 PROCEDURE — 81001 URINALYSIS AUTO W/SCOPE: CPT | Performed by: FAMILY MEDICINE

## 2020-08-20 PROCEDURE — 99213 OFFICE O/P EST LOW 20 MIN: CPT | Mod: 25 | Performed by: FAMILY MEDICINE

## 2020-08-20 PROCEDURE — 80061 LIPID PANEL: CPT | Performed by: FAMILY MEDICINE

## 2020-08-20 PROCEDURE — 99397 PER PM REEVAL EST PAT 65+ YR: CPT | Mod: GA | Performed by: FAMILY MEDICINE

## 2020-08-20 PROCEDURE — 80053 COMPREHEN METABOLIC PANEL: CPT | Performed by: FAMILY MEDICINE

## 2020-08-20 PROCEDURE — 36415 COLL VENOUS BLD VENIPUNCTURE: CPT | Performed by: FAMILY MEDICINE

## 2020-08-20 PROCEDURE — 85018 HEMOGLOBIN: CPT | Performed by: FAMILY MEDICINE

## 2020-08-20 RX ORDER — MONTELUKAST SODIUM 10 MG/1
10 TABLET ORAL AT BEDTIME
COMMUNITY
Start: 2020-08-20 | End: 2020-08-20

## 2020-08-20 RX ORDER — MONTELUKAST SODIUM 10 MG/1
10 TABLET ORAL AT BEDTIME
Qty: 90 TABLET | Refills: 3 | Status: SHIPPED | OUTPATIENT
Start: 2020-08-20 | End: 2021-09-30

## 2020-08-20 SDOH — HEALTH STABILITY: MENTAL HEALTH: HOW OFTEN DO YOU HAVE 6 OR MORE DRINKS ON ONE OCCASION?: NEVER

## 2020-08-20 SDOH — ECONOMIC STABILITY: FOOD INSECURITY: WITHIN THE PAST 12 MONTHS, THE FOOD YOU BOUGHT JUST DIDN'T LAST AND YOU DIDN'T HAVE MONEY TO GET MORE.: NEVER TRUE

## 2020-08-20 SDOH — HEALTH STABILITY: MENTAL HEALTH: HOW MANY STANDARD DRINKS CONTAINING ALCOHOL DO YOU HAVE ON A TYPICAL DAY?: 1 OR 2

## 2020-08-20 SDOH — HEALTH STABILITY: MENTAL HEALTH: HOW OFTEN DO YOU HAVE A DRINK CONTAINING ALCOHOL?: 4 OR MORE TIMES A WEEK

## 2020-08-20 SDOH — ECONOMIC STABILITY: FOOD INSECURITY: WITHIN THE PAST 12 MONTHS, YOU WORRIED THAT YOUR FOOD WOULD RUN OUT BEFORE YOU GOT MONEY TO BUY MORE.: NEVER TRUE

## 2020-08-20 SDOH — ECONOMIC STABILITY: TRANSPORTATION INSECURITY
IN THE PAST 12 MONTHS, HAS THE LACK OF TRANSPORTATION KEPT YOU FROM MEDICAL APPOINTMENTS OR FROM GETTING MEDICATIONS?: NO

## 2020-08-20 SDOH — ECONOMIC STABILITY: TRANSPORTATION INSECURITY
IN THE PAST 12 MONTHS, HAS LACK OF TRANSPORTATION KEPT YOU FROM MEETINGS, WORK, OR FROM GETTING THINGS NEEDED FOR DAILY LIVING?: NO

## 2020-08-20 SDOH — ECONOMIC STABILITY: INCOME INSECURITY: HOW HARD IS IT FOR YOU TO PAY FOR THE VERY BASICS LIKE FOOD, HOUSING, MEDICAL CARE, AND HEATING?: NOT HARD AT ALL

## 2020-08-20 ASSESSMENT — MIFFLIN-ST. JEOR: SCORE: 1562.94

## 2020-08-20 NOTE — PROGRESS NOTES
SUBJECTIVE:  The patient is in for an annual physical.  Patient is complaining of the following:  Wanting a PE understanding this is not a service covered by medicare. Signed waiver. Has multiple complaints understanding this is not a part of a preventative exam.    Wants fasting labs, refill of Singulair and chronic cough with his allergies, and to check on frequent urination.  Chronic right lower groin pain for 1+ year, I feel a bulge  Chronic right testicular pain after a scrotal surgery/ see DR Gonzalez evaluation with scrotal ultrasound 2018  Chronic lower leg pain with varicose veins/ uses compression stockings  Cardiology follows and refills his medication for CAD/ cath 2015. Last metabolic panel showed kidney disease stage 3/ never rechecked      PAST MEDICAL HISTORY:  Past Medical History:  No date: CAD (coronary artery disease)      Comment:  cardiac cath 2015: non-obstructive diffuse CAD   No date: Chest pain      Comment:  chronic stable  No date: Chronic stable angina (H)  No date: Contact dermatitis and other eczema, due to unspecified cause  No date: DEPRESSIVE DISORDER NEC  No date: Dyslipidemia  No date: Fam hx-cardiovas dis NEC      Comment:  Mother and Father  No date: Hernia, abdominal  No date: HTN (hypertension)  No date: IRRITABLE COLON  No date: Mild aortic stenosis  No date: Mumps  No date: Personal history of urinary calculi  No date: Polyneuropathy  2008: Skin cancer, basal cell  No date: SOB (shortness of breath)  No date: Varicose veins    CURRENT MEDICATIONS:  Current Outpatient Medications   Medication     aspirin 81 MG tablet     coenzyme Q-10 200 MG CAPS     COMPRESSION STOCKINGS     DiphenhydrAMINE HCl, Sleep, 50 MG TABS     fluticasone (FLONASE) 50 MCG/ACT spray     hydrochlorothiazide (HYDRODIURIL) 25 MG tablet     ibuprofen 200 MG capsule     montelukast (SINGULAIR) 10 MG tablet     naproxen sodium (ALEVE) 220 MG tablet     nitroglycerin (NITROSTAT) 0.4 MG SL tablet      rosuvastatin (CRESTOR) 40 MG tablet     No current facility-administered medications for this visit.        DRUG ALLERGIES:  Amoxicillin; Contrast dye; Imdur [isosorbide]; and Lopressor [metoprolol]    FAMILY HISTORY:  Review of patient's family history indicates:  Problem: Heart Disease      Relation: Mother          Age of Onset: (Not Specified)          Comment: 91  Problem: Cancer - colorectal      Relation: Mother          Age of Onset: (Not Specified)  Problem: Heart Disease      Relation: Father          Age of Onset: (Not Specified)          Comment:  70s  Problem: Diabetes      Relation: Maternal Aunt          Age of Onset: (Not Specified)  Problem: Alzheimer Disease      Relation: No family hx of          Age of Onset: (Not Specified)  Problem: Cancer      Relation: No family hx of          Age of Onset: (Not Specified)  Problem: Prostate Cancer      Relation: No family hx of          Age of Onset: (Not Specified)      SOCIAL HISTORY:  Social History    Socioeconomic History      Marital status:       Spouse name: Velia      Number of children: 2      Years of education: 18      Highest education level: Not on file    Occupational History      Occupation: Consultant        Employer: SELF    Social Needs      Financial resource strain: Not on file      Food insecurity        Worry: Not on file        Inability: Not on file      Transportation needs        Medical: Not on file        Non-medical: Not on file    Tobacco Use      Smoking status: Former Smoker        Types: Cigars        Quit date: 1975        Years since quittin.6      Smokeless tobacco: Never Used      Tobacco comment: 2-3 cigars    Substance and Sexual Activity      Alcohol use: Yes        Alcohol/week: 3.3 standard drinks        Types: 4 Glasses of wine per week        Frequency: 4 or more times a week        Drinks per session: 1 or 2        Binge frequency: Never        Comment: 1-2 glass of wine daily      Drug use:  No      Sexual activity: Yes        Partners: Female        Birth control/protection: Surgical        Comment: vas    Lifestyle      Physical activity        Days per week: Not on file        Minutes per session: Not on file      Stress: Not on file    Relationships      Social connections        Talks on phone: Not on file        Gets together: Not on file        Attends Jainism service: Not on file        Active member of club or organization: Not on file        Attends meetings of clubs or organizations: Not on file        Relationship status: Not on file      Intimate partner violence        Fear of current or ex partner: Not on file        Emotionally abused: Not on file        Physically abused: Not on file        Forced sexual activity: Not on file    Other Topics      Concerns:         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, , [employer]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         Blood Transfusions: No        Caffeine Concern: Yes          2 cups per day        Occupational Exposure: No        Hobby Hazards: No        Sleep Concern: No        Stress Concern: No        Weight Concern: No        Special Diet: No        Back Care: Not Asked        Exercise: No        Bike Helmet: Not Asked        Seat Belt: Yes        Self-Exams: No        Parent/sibling w/ CABG, MI or angioplasty before 65F 55M?: Not Asked    Social History Narrative      Not on file      IMMUNIZATIONS:    Immunization History  Administered            Date(s) Administered    HEPA                  03/04/2015      Influenza (High Dose) 3 valent vaccine                          11/17/2017 11/11/2019      Influenza (IIV3) PF   09/16/2009  11/02/2010  09/23/2011                            01/12/2015  01/15/2015      Influenza Vaccine IM > 6 months Valent IIV4                          10/23/2015      Pneumo Conj 13-V (2010&after)                          10/23/2015      Pneumococcal 23 valent                          02/23/2010      TD (ADULT, 7+)        01/01/1998   "04/06/2006      TDAP Vaccine (Boostrix)                          06/17/2013      Zoster vaccine, live  01/30/2009      REVIEW OF SYSTEMS:    Ears/Nose/Throat: nasal congestion/allergies    Eyes: Negative    Respiratory: chronic cough with allergies    Cardiovascular: CAD followed by cardiology    Gastrointestinal: colon polyps    Genitourinary: frequency and kidney stone    Musculoskeletal: lower leg pain  /aching    Neurologic: negative     Skin: bruising     Psychiatric: negative     Hematologic/Lymphatic/Immunologic:  negative      Endocrine:  negative     OBJECTIVE:  VITALS:  /74 (BP Location: Right arm, Patient Position: Sitting, Cuff Size: Adult Large)   Pulse 75   Temp 97.8  F (36.6  C) (Oral)   Ht 1.791 m (5' 10.5\")   Wt 81.4 kg (179 lb 6.4 oz)   SpO2 99%   BMI 25.38 kg/m    In general, this is a well developed, well nourished appearing male. Elderly.  HEENT:  Unremarkable; fundi are within normal limits.  NECK: Supple; no adenopathy or thyromegaly.   LUNGS:  Clear to auscultation bilaterally.  HEART:  Regular rhythm and rate, normal S1 and S2, no evidence of a murmur.  ABDOMEN:  Bowel sounds are present throughout. The abdomen is soft and nontender, no hepatosplenomegaly or masses appreciated. Right lower groin tenderness and fullness  GENITALIA:  Appears to be normal male with bilaterally descended testes and a circumcised penis.  NEUROLOGICAL:  The exam is nonfocal.  Deep tendon reflexes are symmetric.  EXT: Bilateral lower leg varicose veins with scattered ecchymosis in many stages of healing. No edema  LAB: See Orders    UA: positive for an infection  HGC; WNL      ASSESSMENT:  (Z00.00) Routine history and physical examination of adult  (primary encounter diagnosis)  Plan: CL AFF HEMOGLOBIN (BFP), VENOUS COLLECTION, HCL        PSA, SCREENING ([facility])        ASA daily recommended  Exercise encouraged  Fasting lipid profile obtained  Flu shot recommended  Follow up in 1 year  Hemoglobin " obtained  PSA obtained  Refills given  Seat belt use encouraged  Sunscreen recommended  Check with pharmacy for shingles immunization      (N18.3) CKD (chronic kidney disease) stage 3, GFR 30-59 ml/min (H)  Plan: Comprehensive Metobolic Panel (BFP), VENOUS         COLLECTION, HCL  Urinalysis, Routine (BFP),         OFFICE/OUTPT VISIT,EST,LEVL III        Await labs/ back to cardiology    (N20.0) Calculus of kidney  Plan: Comprehensive Metobolic Panel (BFP), VENOUS         COLLECTION, HCL  Urinalysis, Routine (BFP),         OFFICE/OUTPT VISIT,EST,LEVL III            (R35.0) Frequent urination  Plan: VENOUS COLLECTION, HCL PSA, SCREENING ([facility]),         HCL  Urinalysis, Routine (BFP), Urine Culture         Aerobic Bacterial, OFFICE/OUTPT VISIT,EST,LEVL         III        Patient was instructed to drink plenty of fluids, urinate frequently and to contact the office promptly should she notice fever greater than 102, increase in discomfort, skin rash, lack of improvement after 2 days of treatment, or the appearance of new symptoms.  Await culture    (I25.10) Coronary artery disease involving native coronary artery of native heart without angina pectoris  Plan: Lipid Panel (BFP), Comprehensive Metobolic         Panel (BFP), VENOUS COLLECTION, OFFICE/OUTPT         VISIT,EST,LEVL III        I have reviewed the patient's medical history in detail and updated the computerized patient record.      (E78.2) Mixed hyperlipidemia  Plan: Lipid Panel (BFP), VENOUS COLLECTION,         OFFICE/OUTPT VISIT,EST,LEVL III        I have reviewed the patient's medical history in detail and updated the computerized patient record.    (I10) Essential hypertension, benign  Plan: Comprehensive Metobolic Panel (BFP), VENOUS         COLLECTION, OFFICE/OUTPT VISIT,EST,LEVL III        I have reviewed the patient's medical history in detail and updated the computerized patient record.      (J30.9) Allergic rhinitis, unspecified seasonality,  unspecified trigger  Comment: OTC medication noted  Plan: montelukast (SINGULAIR) 10 MG tablet,         OFFICE/OUTPT VISIT,EST,LEVL III        Refilled for prn use    (K40.90) Unilateral inguinal hernia without obstruction or gangrene, recurrence not specified  Plan: GENERAL SURG ADULT REFERRAL, OFFICE/OUTPT         VISIT,EST,LEVL III        Schedule consult    (I83.893) Varicose veins of both lower extremities with complications  Plan: VASCULAR SURGERY REFERRAL, OFFICE/OUTPT         VISIT,EST,LEVL III        Schedule consult    (Z79.899) Encounter for long-term (current) use of medications  Plan: CL AFF HEMOGLOBIN (BFP), Lipid Panel (BFP),         Comprehensive Metobolic Panel (BFP), VENOUS         COLLECTION            (Z12.5) Screening for prostate cancer  Plan: VENOUS COLLECTION, HCL PSA, SCREENING ([facility])        Back to Urology

## 2020-08-20 NOTE — PATIENT INSTRUCTIONS
Await urine culture and labs  Patient was instructed to drink plenty of fluids, urinate frequently and to contact the office promptly should she notice fever greater than 102, increase in discomfort, skin rash, lack of improvement after 2 days of treatment, or the appearance of new symptoms.    Schedule general surgery visit  Schedule varicose vein consultation  Schedule cardiology recheck       ASA daily recommended  Exercise encouraged  Fasting lipid profile obtained  Flu shot recommended  Follow up in 1 year  Hemoglobin obtained  PSA obtained  Refills given  Seat belt use encouraged  Sunscreen recommended  Check with pharmacy for shingles immunization

## 2020-08-20 NOTE — NURSING NOTE
Patient is here for a full physical exam.    Pre-Visit Screening :  Immunizations : up to date  Colon Screening : is up to date  Mammogram: NA  Asthma Action Test/Plan : NA  PHQ9 :  NA  GAD7 :  NA  Patient's  BMI Body mass index is 25.38 kg/m .  Questioned patient about current smoking habits.  Pt. has quit smoking.  OK to leave a detailed voice message regarding today's visit Yes, phone # [phone]      ETOH screening: addressed in history

## 2020-08-21 LAB — ABBOTT PSA - QUEST: 0.2 NG/ML

## 2020-08-23 LAB — URINE VOIDED CULTURE: ABNORMAL

## 2020-08-24 ENCOUNTER — TELEPHONE (OUTPATIENT)
Dept: FAMILY MEDICINE | Facility: CLINIC | Age: 75
End: 2020-08-24

## 2020-08-24 DIAGNOSIS — N30.00 ACUTE CYSTITIS WITHOUT HEMATURIA: Primary | ICD-10-CM

## 2020-08-24 RX ORDER — NITROFURANTOIN 25; 75 MG/1; MG/1
100 CAPSULE ORAL 2 TIMES DAILY
Qty: 14 CAPSULE | Refills: 0 | Status: SHIPPED | OUTPATIENT
Start: 2020-08-24 | End: 2021-03-11

## 2020-08-24 NOTE — TELEPHONE ENCOUNTER
I sent the antibiotic. I would like his permission to send urine testing to the urology specialists for follow up?

## 2020-08-24 NOTE — TELEPHONE ENCOUNTER
Called and left message that his urone cultre returned with a low grade infection. I would like to start an antibiotic and relay this information to his urology specialists/ await call back. Sent a MY Chart message.

## 2020-08-26 NOTE — TELEPHONE ENCOUNTER
I talked with patient RE Urologist. The last Urologist he saw was Dr. Tanner with [facility] in 2018. Dr. Tanner is on [employer], no need to fax labs. They can view in Epic

## 2020-08-28 DIAGNOSIS — E78.2 MIXED HYPERLIPIDEMIA: ICD-10-CM

## 2020-08-28 RX ORDER — ROSUVASTATIN CALCIUM 40 MG/1
40 TABLET, COATED ORAL DAILY
Qty: 90 TABLET | Refills: 3 | Status: SHIPPED | OUTPATIENT
Start: 2020-08-28 | End: 2021-03-30

## 2020-09-10 ENCOUNTER — OFFICE VISIT (OUTPATIENT)
Dept: VASCULAR SURGERY | Facility: CLINIC | Age: 75
End: 2020-09-10
Attending: FAMILY MEDICINE
Payer: MEDICARE

## 2020-09-10 DIAGNOSIS — I83.893 VARICOSE VEINS OF BOTH LOWER EXTREMITIES WITH COMPLICATIONS: Primary | ICD-10-CM

## 2020-09-10 PROCEDURE — 99202 OFFICE O/P NEW SF 15 MIN: CPT | Performed by: SURGERY

## 2020-09-10 NOTE — LETTER
9/10/2020         RE: Nilesh Dos Santos  [address] [place] [address]        Dear Colleague,    Thank you for referring your patient, Nilesh Dos Santos, [facility] Please see a copy of my visit note below.    I had the pleasure of seeing Mr. Nilesh Dos Santos in the vein clinic today.  He has been referred to us for further evaluation of lower extremity discomfort and to assess whether or not this is due to venous insufficiency.  Patient reports he is quite active.  He feels pain and aching in his lower extremities at night when he is trying to sleep.  He notices muscle aches in his calves when he is lying down.  He has not seen any prominent varicose veins in the lower extremities and does not have a history of deep venous thrombosis.  He also does not report any edema.  He tells me that he has worn compression stockings in the past.  He had knee-high stockings which she felt were too tight and he could not wear them any more than 4 hours.  He would still have nocturnal pain in his calf muscles whether or not he wore compression stockings during the day or not.    On examination I see small varicose veins in his lower extremities.  There is no evidence of phlebitis or tenderness.  There is no edema.  There is a small cluster of spider veins along the left ankle.  Right dorsalis pedis pulses 2+ palpable.  Left dorsalis pedis pulses 2+ palpable.  Right posterior tibial pulses 2+ palpable.  Left posterior tibial pulses 2+ palpable.    Diagnosis: Bilateral lower extremity varicose veins, asymptomatic.    Plan: I do not believe his symptoms are due to venous insufficiency.  As a general rule even for muscle aches a compression stocking is a good idea.  I have given him a prescription for 15 to 20 mmHg compression stockings to be worn if and when convenient.  I do not see the role for further imaging or any surgical intervention.  He can follow-up on a as needed  basis.      Again, thank you for allowing me to participate in the care of your patient.        Sincerely,        Eulalio Medina MD

## 2020-09-10 NOTE — PROGRESS NOTES
I had the pleasure of seeing . Nilseh Dos Santos in the vein clinic today.  He has been referred to us for further evaluation of lower extremity discomfort and to assess whether or not this is due to venous insufficiency.  Patient reports he is quite active.  He feels pain and aching in his lower extremities at night when he is trying to sleep.  He notices muscle aches in his calves when he is lying down.  He has not seen any prominent varicose veins in the lower extremities and does not have a history of deep venous thrombosis.  He also does not report any edema.  He tells me that he has worn compression stockings in the past.  He had knee-high stockings which she felt were too tight and he could not wear them any more than 4 hours.  He would still have nocturnal pain in his calf muscles whether or not he wore compression stockings during the day or not.    On examination I see small varicose veins in his lower extremities.  There is no evidence of phlebitis or tenderness.  There is no edema.  There is a small cluster of spider veins along the left ankle.  Right dorsalis pedis pulses 2+ palpable.  Left dorsalis pedis pulses 2+ palpable.  Right posterior tibial pulses 2+ palpable.  Left posterior tibial pulses 2+ palpable.    Diagnosis: Bilateral lower extremity varicose veins, asymptomatic.    Plan: I do not believe his symptoms are due to venous insufficiency.  As a general rule even for muscle aches a compression stocking is a good idea.  I have given him a prescription for 15 to 20 mmHg compression stockings to be worn if and when convenient.  I do not see the role for further imaging or any surgical intervention.  He can follow-up on a as needed basis.

## 2020-09-18 DIAGNOSIS — I10 ESSENTIAL HYPERTENSION, BENIGN: ICD-10-CM

## 2020-09-18 RX ORDER — HYDROCHLOROTHIAZIDE 25 MG/1
25 TABLET ORAL DAILY
Qty: 90 TABLET | Refills: 3 | Status: SHIPPED | OUTPATIENT
Start: 2020-09-18 | End: 2021-03-30

## 2020-12-17 ENCOUNTER — TRANSFERRED RECORDS (OUTPATIENT)
Dept: FAMILY MEDICINE | Facility: CLINIC | Age: 75
End: 2020-12-17

## 2021-01-09 ENCOUNTER — HEALTH MAINTENANCE LETTER (OUTPATIENT)
Age: 76
End: 2021-01-09

## 2021-03-11 ENCOUNTER — OFFICE VISIT (OUTPATIENT)
Dept: FAMILY MEDICINE | Facility: CLINIC | Age: 76
End: 2021-03-11

## 2021-03-11 VITALS
HEIGHT: 71 IN | DIASTOLIC BLOOD PRESSURE: 60 MMHG | BODY MASS INDEX: 27.3 KG/M2 | SYSTOLIC BLOOD PRESSURE: 112 MMHG | TEMPERATURE: 97.9 F | RESPIRATION RATE: 20 BRPM | HEART RATE: 82 BPM | OXYGEN SATURATION: 98 % | WEIGHT: 195 LBS

## 2021-03-11 DIAGNOSIS — R30.0 DYSURIA: Primary | ICD-10-CM

## 2021-03-11 LAB
ALBUMIN (URINE): 100 MG/DL
APPEARANCE UR: ABNORMAL
BACTERIA, UR: ABNORMAL
BILIRUB UR QL: ABNORMAL
CASTS/LPF: ABNORMAL
COLOR UR: YELLOW
EP/HPF: ABNORMAL
GLUCOSE URINE: ABNORMAL MG/DL
HGB UR QL: ABNORMAL
KETONES UR QL: ABNORMAL MG/DL
LEUKOCYTE ESTERASE - QUEST: ABNORMAL
MISC.: ABNORMAL
NITRITE UR QL STRIP: ABNORMAL
PH UR STRIP: 6.5 PH (ref 5–7)
RBC, UR MICRO: ABNORMAL (ref ?–2)
SP. GRAVITY: 1.02
UROBILINOGEN UR QL STRIP: 1 EU/DL (ref 0.2–1)
WBC, UR MICRO: ABNORMAL (ref ?–2)

## 2021-03-11 PROCEDURE — 99213 OFFICE O/P EST LOW 20 MIN: CPT | Performed by: FAMILY MEDICINE

## 2021-03-11 PROCEDURE — 81001 URINALYSIS AUTO W/SCOPE: CPT | Performed by: FAMILY MEDICINE

## 2021-03-11 RX ORDER — BETAMETHASONE DIPROPIONATE 0.5 MG/G
CREAM TOPICAL
COMMUNITY
Start: 2021-01-28 | End: 2021-09-30

## 2021-03-11 RX ORDER — NITROFURANTOIN 25; 75 MG/1; MG/1
100 CAPSULE ORAL 2 TIMES DAILY
Qty: 14 CAPSULE | Refills: 0 | Status: SHIPPED | OUTPATIENT
Start: 2021-03-11 | End: 2021-05-13

## 2021-03-11 ASSESSMENT — MIFFLIN-ST. JEOR: SCORE: 1628.7

## 2021-03-11 NOTE — NURSING NOTE
Chief Complaint   Patient presents with     UTI     slight burning, frequency and urgency at night, did at home test     Pre-visit Screening:  Immunizations:  up to date  Colonoscopy:  is up to date  Mammogram: NA  Asthma Action Test/Plan:  NA  PHQ9:  NA  GAD7:  NA  Questioned patient about current smoking habits Pt. quit smoking some time ago.  Ok to leave detailed message on voice mail for today's visit only Yes, phone # [phone]

## 2021-03-13 LAB — URINE VOIDED CULTURE: NORMAL

## 2021-03-30 DIAGNOSIS — E78.2 MIXED HYPERLIPIDEMIA: ICD-10-CM

## 2021-03-30 DIAGNOSIS — I10 ESSENTIAL HYPERTENSION, BENIGN: ICD-10-CM

## 2021-03-30 RX ORDER — ROSUVASTATIN CALCIUM 40 MG/1
40 TABLET, COATED ORAL DAILY
Qty: 90 TABLET | Refills: 1 | Status: SHIPPED | OUTPATIENT
Start: 2021-03-30 | End: 2021-07-21

## 2021-03-30 RX ORDER — HYDROCHLOROTHIAZIDE 25 MG/1
25 TABLET ORAL DAILY
Qty: 90 TABLET | Refills: 1 | Status: SHIPPED | OUTPATIENT
Start: 2021-03-30 | End: 2021-07-21

## 2021-05-13 ENCOUNTER — OFFICE VISIT (OUTPATIENT)
Dept: FAMILY MEDICINE | Facility: CLINIC | Age: 76
End: 2021-05-13

## 2021-05-13 VITALS
TEMPERATURE: 97.2 F | DIASTOLIC BLOOD PRESSURE: 58 MMHG | RESPIRATION RATE: 20 BRPM | WEIGHT: 191.6 LBS | HEIGHT: 71 IN | SYSTOLIC BLOOD PRESSURE: 104 MMHG | BODY MASS INDEX: 26.82 KG/M2 | HEART RATE: 80 BPM

## 2021-05-13 DIAGNOSIS — R30.0 DYSURIA: Primary | ICD-10-CM

## 2021-05-13 PROCEDURE — 81001 URINALYSIS AUTO W/SCOPE: CPT | Performed by: FAMILY MEDICINE

## 2021-05-13 PROCEDURE — 99213 OFFICE O/P EST LOW 20 MIN: CPT | Performed by: FAMILY MEDICINE

## 2021-05-13 RX ORDER — CIPROFLOXACIN 500 MG/1
500 TABLET, FILM COATED ORAL 2 TIMES DAILY
Qty: 10 TABLET | Refills: 0 | Status: SHIPPED | OUTPATIENT
Start: 2021-05-13 | End: 2021-09-30

## 2021-05-13 RX ORDER — ROSUVASTATIN CALCIUM 40 MG/1
40 TABLET, COATED ORAL DAILY
Qty: 90 TABLET | Refills: 1 | Status: CANCELLED | OUTPATIENT
Start: 2021-05-13

## 2021-05-13 ASSESSMENT — MIFFLIN-ST. JEOR: SCORE: 1613.28

## 2021-05-13 NOTE — PROGRESS NOTES
SUBJECTIVE: Nilesh Dos Santos is a 76 year old male who complains of urinary frequency, urgency without dysuria x many weeks, without flank pain, fever, chills, or abnormal penile discharge or bleeding.     PT had pos UTI 8/20    Then had pos UA 3/21 but cx neg    Pt states he gets up 5 times per night    PSA normal 8/20    OBJECTIVE: Appears well, in no apparent distress.  Vital signs are normal. The abdomen is soft without tenderness, guarding, mass, rebound or organomegaly. No CVA tenderness or inguinal adenopathy noted. Urine dipstick shows positive for WBC's, positive for RBC's, positive for leukocytes and positive for bacturia.  Micro exam: 25-50 WBC's per HPF and 5-10 RBC's per HPF.     ASSESSMENT: possible UTI uncomplicated without evidence of pyelonephritis    Given that symptoms chronic and last Ucx neg despite UA looking positive may be anatomic urologic issue /prostate issue-plan to see urology if not better    PLAN: Treatment per orders - also push fluids, may use Pyridium OTC prn. Call or return to clinic prn if these symptoms worsen or fail to improve as anticipated.

## 2021-05-15 LAB — URINE VOIDED CULTURE: NORMAL

## 2021-07-15 ENCOUNTER — HOSPITAL ENCOUNTER (OUTPATIENT)
Dept: CARDIOLOGY | Facility: CLINIC | Age: 76
Discharge: HOME | End: 2021-07-15
Attending: INTERNAL MEDICINE | Admitting: INTERNAL MEDICINE
Payer: COMMERCIAL

## 2021-07-15 DIAGNOSIS — I25.10 CORONARY ARTERY DISEASE INVOLVING NATIVE CORONARY ARTERY OF NATIVE HEART WITHOUT ANGINA PECTORIS: ICD-10-CM

## 2021-07-15 LAB — LVEF ECHO: NORMAL

## 2021-07-15 PROCEDURE — 93306 TTE W/DOPPLER COMPLETE: CPT | Mod: 26 | Performed by: INTERNAL MEDICINE

## 2021-07-15 PROCEDURE — 93306 TTE W/DOPPLER COMPLETE: CPT

## 2021-07-21 ENCOUNTER — LAB (OUTPATIENT)
Dept: LAB | Facility: CLINIC | Age: 76
End: 2021-07-21
Payer: COMMERCIAL

## 2021-07-21 ENCOUNTER — OFFICE VISIT (OUTPATIENT)
Dept: CARDIOLOGY | Facility: CLINIC | Age: 76
End: 2021-07-21
Payer: COMMERCIAL

## 2021-07-21 VITALS
SYSTOLIC BLOOD PRESSURE: 136 MMHG | WEIGHT: 183.8 LBS | HEIGHT: 72 IN | DIASTOLIC BLOOD PRESSURE: 75 MMHG | HEART RATE: 62 BPM | BODY MASS INDEX: 24.89 KG/M2

## 2021-07-21 DIAGNOSIS — I25.10 CORONARY ARTERY DISEASE INVOLVING NATIVE CORONARY ARTERY OF NATIVE HEART WITHOUT ANGINA PECTORIS: ICD-10-CM

## 2021-07-21 DIAGNOSIS — R73.09 OTHER ABNORMAL GLUCOSE: ICD-10-CM

## 2021-07-21 DIAGNOSIS — E78.2 MIXED HYPERLIPIDEMIA: ICD-10-CM

## 2021-07-21 DIAGNOSIS — I10 ESSENTIAL HYPERTENSION, BENIGN: ICD-10-CM

## 2021-07-21 DIAGNOSIS — R07.89 ATYPICAL CHEST PAIN: Primary | ICD-10-CM

## 2021-07-21 LAB
ALT SERPL W P-5'-P-CCNC: 19 U/L (ref 0–70)
ANION GAP SERPL CALCULATED.3IONS-SCNC: 6 MMOL/L (ref 3–14)
BUN SERPL-MCNC: 16 MG/DL (ref 7–30)
CALCIUM SERPL-MCNC: 9.7 MG/DL (ref 8.5–10.1)
CHLORIDE BLD-SCNC: 104 MMOL/L (ref 94–109)
CHOLEST SERPL-MCNC: 120 MG/DL
CO2 SERPL-SCNC: 31 MMOL/L (ref 20–32)
CREAT SERPL-MCNC: 1.33 MG/DL (ref 0.66–1.25)
FASTING STATUS PATIENT QL REPORTED: YES
GFR SERPL CREATININE-BSD FRML MDRD: 52 ML/MIN/1.73M2
GLUCOSE BLD-MCNC: 100 MG/DL (ref 70–99)
HBA1C MFR BLD: 5.3 % (ref 0–5.6)
HDLC SERPL-MCNC: 60 MG/DL
LDLC SERPL CALC-MCNC: 48 MG/DL
NONHDLC SERPL-MCNC: 60 MG/DL
POTASSIUM BLD-SCNC: 3.4 MMOL/L (ref 3.4–5.3)
SODIUM SERPL-SCNC: 141 MMOL/L (ref 133–144)
TRIGL SERPL-MCNC: 60 MG/DL

## 2021-07-21 PROCEDURE — 84460 ALANINE AMINO (ALT) (SGPT): CPT | Performed by: INTERNAL MEDICINE

## 2021-07-21 PROCEDURE — 36415 COLL VENOUS BLD VENIPUNCTURE: CPT | Performed by: INTERNAL MEDICINE

## 2021-07-21 PROCEDURE — 99215 OFFICE O/P EST HI 40 MIN: CPT | Performed by: INTERNAL MEDICINE

## 2021-07-21 PROCEDURE — 93000 ELECTROCARDIOGRAM COMPLETE: CPT | Performed by: INTERNAL MEDICINE

## 2021-07-21 PROCEDURE — 80048 BASIC METABOLIC PNL TOTAL CA: CPT | Performed by: INTERNAL MEDICINE

## 2021-07-21 PROCEDURE — 80061 LIPID PANEL: CPT | Performed by: INTERNAL MEDICINE

## 2021-07-21 PROCEDURE — 83036 HEMOGLOBIN GLYCOSYLATED A1C: CPT | Performed by: INTERNAL MEDICINE

## 2021-07-21 RX ORDER — HYDROCHLOROTHIAZIDE 25 MG/1
25 TABLET ORAL DAILY
Qty: 90 TABLET | Refills: 3 | Status: SHIPPED | OUTPATIENT
Start: 2021-07-21 | End: 2022-06-08

## 2021-07-21 RX ORDER — ROSUVASTATIN CALCIUM 40 MG/1
40 TABLET, COATED ORAL DAILY
Qty: 90 TABLET | Refills: 3 | Status: SHIPPED | OUTPATIENT
Start: 2021-07-21 | End: 2022-06-08

## 2021-07-21 ASSESSMENT — MIFFLIN-ST. JEOR: SCORE: 1601.71

## 2021-07-21 NOTE — LETTER
7/21/2021    Mis Rothman MD  [address], [address]  [facility] [address]    RE: Nilesh Dos Santos       Dear Colleague,    I had the pleasure of seeing Nilesh Dos Santos in the [facility].    HPI and Plan:   Mr. Dos Santos is a very pleasant 76-year-old gentleman with history of chronic stable angina in the past, with coronary CT angiogram in 2013 showing diffuse coronary artery calcification and then subsequent coronary angiogram in 2015 when he was found to have moderate stenosis involving the RPDA and posterolateral branch with FFR of 0.9 and 0.94, respectively, indicating they were not flow-limiting lesions.  He also has history of hypertension and is on hydrochlorothiazide.  Today is coming for routine follow-up.  Patient has an overall healthwise he feels reasonably okay.  He has struggled with the leg discomfort for many years this is intermittent sharp discomfort symptom achy discomfort he has been seen by vein specialist and was told that he has varicose vein but unlikely to cause his symptoms.  He is thinking about seeing a neurologist due to concern about neuropathy.  He denies any exertional symptoms or chest discomfort or shortness of breath and foot sometimes intermittently can get resting chest discomfort mild intensity random in nature.  Historically his LDL has been well controlled in 50s.  He appears to have some chronic kidney disease with a creatinine last year 1.57 then subsequently 1.21.  He is on baby aspirin, Crestor 40 mg daily.  Recent echocardiogram showed normal LV function with borderline dilated ascending aorta.  EKG today shows sinus rhythm.    Assessment plan  A pleasant 76-year gentleman with a history of chronic stable angina in the past now with no exertional chest discomfort or any particular exertional symptoms with the history of hypertension, chronic kidney disease.  Overall cardiac status wise he appears stable.  In fact he  has some intermittent atypical chest discomfort at rest not with exertion.  I recommend exercise stress echocardiogram.  My office going to update him with the results.  I am also setting up a tentative follow-up with an JOLANTA in 1 month time if however no significant abnormalities are seen on stress echocardiogram and if patient wishes he can even cancel the follow-up appointment.  I do recommend checking lipid panel as he is already fasting today along with a BMP at patient's request hemoglobin A1c.  To be noted hemoglobin A1c has been within normal limits in the past although previously is a basic metabolic profile showed mildly elevated blood glucose.  On examination he has 2+ pulses distally lower extremity dorsalis pedis and tibials posterior and leg discomfort does not appear to be claudication in nature.    1.  CAD with diffuse coronary disease as noted above with history of chronic stable angina, in fact no exertional symptoms now.  Some intermittent atypical chest discomfort.  Normal LV function recent echocardiogram.  2.  Historically well-controlled LDL on high intensity statin.  3.  Chronic kidney disease    Recommendations  Exercise stress echocardiogram  Check BMP, lipid panel, hemoglobin A1c  Tentative follow-up with JOLANTA in 1 month, if however no significant abnormalities are noted on exercise stress echocardiogram and patient wishes he can cancel the follow-up and we can see him back in 1 year, sooner if he notes any change in clinical status.    Orders Placed This Encounter   Procedures     Lipid Profile     ALT     Basic metabolic panel     Hemoglobin A1c     Basic metabolic panel     Lipid Profile     ALT     Follow-Up with Cardiac Advanced Practice Provider     EKG 12-lead complete w/read - Clinics (performed today)     Exercise Stress Echocardiogram       Orders Placed This Encounter   Medications     Cholecalciferol (VITAMIN D3) 25 MCG (1000 UT) CAPS     Zinc Sulfate (ZINC 15 PO)     Sig: Dose  unknown     rosuvastatin (CRESTOR) 40 MG tablet     Sig: Take 1 tablet (40 mg) by mouth daily     Dispense:  90 tablet     Refill:  3     hydrochlorothiazide (HYDRODIURIL) 25 MG tablet     Sig: Take 1 tablet (25 mg) by mouth daily     Dispense:  90 tablet     Refill:  3       Medications Discontinued During This Encounter   Medication Reason     rosuvastatin (CRESTOR) 40 MG tablet Reorder     hydrochlorothiazide (HYDRODIURIL) 25 MG tablet Reorder         Encounter Diagnoses   Name Primary?     Mixed hyperlipidemia      Essential hypertension, benign      Atypical chest pain Yes     Coronary artery disease involving native coronary artery of native heart without angina pectoris      Other abnormal glucose         CURRENT MEDICATIONS:  Current Outpatient Medications   Medication Sig Dispense Refill     aspirin 81 MG tablet Take by mouth daily       Cholecalciferol (VITAMIN D3) 25 MCG (1000 UT) CAPS        coenzyme Q-10 200 MG CAPS Take 200 mg by mouth daily       DiphenhydrAMINE HCl, Sleep, 50 MG TABS Take 50 mg by mouth nightly as needed 14 tablet      fluticasone (FLONASE) 50 MCG/ACT spray Spray 1-2 sprays into both nostrils daily 3 Bottle 3     hydrochlorothiazide (HYDRODIURIL) 25 MG tablet Take 1 tablet (25 mg) by mouth daily 90 tablet 3     naproxen sodium (ALEVE) 220 MG tablet Take 220 mg by mouth as needed for moderate pain       nitroglycerin (NITROSTAT) 0.4 MG SL tablet Place under the tongue every 5 minutes as needed       rosuvastatin (CRESTOR) 40 MG tablet Take 1 tablet (40 mg) by mouth daily 90 tablet 3     Zinc Sulfate (ZINC 15 PO) Dose unknown       augmented betamethasone dipropionate (DIPROLENE-AF) 0.05 % external cream  (Patient not taking: Reported on 7/21/2021)       ciprofloxacin (CIPRO) 500 MG tablet Take 1 tablet (500 mg) by mouth 2 times daily (Patient not taking: Reported on 7/21/2021) 10 tablet 0     montelukast (SINGULAIR) 10 MG tablet Take 1 tablet (10 mg) by mouth At Bedtime (Patient not  "taking: Reported on 7/21/2021) 90 tablet 3       ALLERGIES     Allergies   Allergen Reactions     Amoxicillin      severe rash     Contrast Dye Hives     Patient states this was years ago and he believes he has had dye since that time without problems.     Imdur [Isosorbide] Other (See Comments)     headache     Lopressor [Metoprolol] Fatigue       PAST MEDICAL HISTORY:  Past Medical History:   Diagnosis Date     CAD (coronary artery disease)     cardiac cath 2015: non-obstructive diffuse CAD      Chest pain     chronic stable     Chronic stable angina (H)      Contact dermatitis and other eczema, due to unspecified cause      DEPRESSIVE DISORDER NEC      Dyslipidemia      Fam hx-cardiovas dis NEC     Mother and Father     Hernia, abdominal      HTN (hypertension)      IRRITABLE COLON      Mild aortic stenosis      Mumps      Personal history of urinary calculi      Polyneuropathy      Skin cancer, basal cell 2008     SOB (shortness of breath)      Varicose veins        PAST SURGICAL HISTORY:  Past Surgical History:   Procedure Laterality Date     ANGIOGRAM  11/17/2015    Med Tx, no flow limiting lesions     C URETER ENDOSCOPY THRU URETEROSTOMY REMV FB OR CALCULUS  2001    dr de la garza     COLONOSCOPY Left 5/29/2018    Procedure: COLONOSCOPY;  colonoscopy (fv)  ;  Surgeon: Nilesh Cervantes MD;  Location:  GI     CYSTOSCOPY       EYE EXAM ESTABLISHED PT  2009     HC EXCISE VARICOCELE      \"cautery\" through intra venous approach     HC KNEE SCOPE, DIAGNOSTIC  2004    Arthroscopy, Knee/left knee      HC REPAIR ING HERNIA,5+Y/O,REDUCIBL  1990's    Inguinal Hernia Repair  Right     TEST NOT FOUND  2002    Per cut removal of L kidney stone     TESTICLE SURGERY  age 35    Varicocoele repair     VASECTOMY  age 30     ZZHC COLONOSCOPY THRU STOMA, DIAGNOSTIC  2005       FAMILY HISTORY:  Family History   Problem Relation Age of Onset     Heart Disease Mother         91     Cancer - colorectal Mother      Heart Disease Father  "         70s     Diabetes Maternal Aunt      Alzheimer Disease No family hx of      Cancer No family hx of      Prostate Cancer No family hx of        SOCIAL HISTORY:  Social History     Socioeconomic History     Marital status:      Spouse name: Velia     Number of children: 2     Years of education: 18     Highest education level: None   Occupational History     Occupation: Consultant     Employer: SELF   Tobacco Use     Smoking status: Former Smoker     Types: Cigars     Quit date: 1975     Years since quittin.5     Smokeless tobacco: Never Used     Tobacco comment: 2-3 cigars   Substance and Sexual Activity     Alcohol use: Yes     Alcohol/week: 3.3 standard drinks     Types: 4 Glasses of wine per week     Comment: 1-2 glass of wine daily     Drug use: No     Sexual activity: Yes     Partners: Female     Birth control/protection: Surgical     Comment: vas   Other Topics Concern      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 Not Asked   Social History Narrative     None     Social Determinants of Health     Financial Resource Strain: Low Risk      Difficulty of Paying Living Expenses: Not hard at all   Food Insecurity: No Food Insecurity     Worried About Running Out of Food in the Last Year: Never true     Ran Out of Food in the Last Year: Never true   Transportation Needs: No Transportation Needs     Lack of Transportation (Medical): No     Lack of Transportation (Non-Medical): No   Physical Activity:      Days of Exercise per Week:      Minutes of Exercise per Session:    Stress:      Feeling of  Stress :    Social Connections:      Frequency of Communication with Friends and Family:      Frequency of Social Gatherings with Friends and Family:      Attends Oriental orthodox Services:      Active Member of Clubs or Organizations:      Attends Club or Organization Meetings:      Marital Status:    Intimate Partner Violence:      Fear of Current or Ex-Partner:      Emotionally Abused:      Physically Abused:      Sexually Abused:        Review of Systems:  Skin:  Negative       Eyes:  Positive for glasses    ENT:  Negative      Respiratory:  Negative       Cardiovascular:  Negative;palpitations;syncope or near-syncope;cyanosis;dizziness lower extremity symptoms;lightheadedness;fatigue;edema LE aches, mild chest discomfort, at rest, 2-3X week  Gastroenterology: Negative      Genitourinary:  Positive for urinary frequency;nocturia    Musculoskeletal:  Positive for joint pain    Neurologic:  Negative      Psychiatric:  Positive for sleep disturbances    Heme/Lymph/Imm:  Negative      Endocrine:  Negative        Physical Exam:  Vitals: /75 (BP Location: Right arm, Cuff Size: Adult Large)   Pulse 62   Ht 1.829 m (6')   Wt 83.4 kg (183 lb 12.8 oz)   BMI 24.93 kg/m      General patient appears comfortable  Neck normal JVP  Cardiovascular system S1-S2 normal no murmur rub or gallop  Respite system clear to auscultation  Extremities small varicose vein noted, no pedal edema, dorsalis pedis and tibials pulses 2+ bilaterally  Neurological alert, oriented      CC  No referring provider defined for this encounter.                      Thank you for allowing me to participate in the care of your patient.      Sincerely,     Bharat Mata MD     [facility]  cc:   No referring provider defined for this encounter.

## 2021-07-21 NOTE — PROGRESS NOTES
HPI and Plan:   Mr. Dos Santos is a very pleasant 76-year-old gentleman with history of chronic stable angina in the past, with coronary CT angiogram in 2013 showing diffuse coronary artery calcification and then subsequent coronary angiogram in 2015 when he was found to have moderate stenosis involving the RPDA and posterolateral branch with FFR of 0.9 and 0.94, respectively, indicating they were not flow-limiting lesions.  He also has history of hypertension and is on hydrochlorothiazide.  Today is coming for routine follow-up.  Patient has an overall healthwise he feels reasonably okay.  He has struggled with the leg discomfort for many years this is intermittent sharp discomfort symptom achy discomfort he has been seen by vein specialist and was told that he has varicose vein but unlikely to cause his symptoms.  He is thinking about seeing a neurologist due to concern about neuropathy.  He denies any exertional symptoms or chest discomfort or shortness of breath and foot sometimes intermittently can get resting chest discomfort mild intensity random in nature.  Historically his LDL has been well controlled in 50s.  He appears to have some chronic kidney disease with a creatinine last year 1.57 then subsequently 1.21.  He is on baby aspirin, Crestor 40 mg daily.  Recent echocardiogram showed normal LV function with borderline dilated ascending aorta.  EKG today shows sinus rhythm.    Assessment plan  A pleasant 76-year gentleman with a history of chronic stable angina in the past now with no exertional chest discomfort or any particular exertional symptoms with the history of hypertension, chronic kidney disease.  Overall cardiac status wise he appears stable.  In fact he has some intermittent atypical chest discomfort at rest not with exertion.  I recommend exercise stress echocardiogram.  My office going to update him with the results.  I am also setting up a tentative follow-up with an JOLANTA in 1 month time if  however no significant abnormalities are seen on stress echocardiogram and if patient wishes he can even cancel the follow-up appointment.  I do recommend checking lipid panel as he is already fasting today along with a BMP at patient's request hemoglobin A1c.  To be noted hemoglobin A1c has been within normal limits in the past although previously is a basic metabolic profile showed mildly elevated blood glucose.  On examination he has 2+ pulses distally lower extremity dorsalis pedis and tibials posterior and leg discomfort does not appear to be claudication in nature.    1.  CAD with diffuse coronary disease as noted above with history of chronic stable angina, in fact no exertional symptoms now.  Some intermittent atypical chest discomfort.  Normal LV function recent echocardiogram.  2.  Historically well-controlled LDL on high intensity statin.  3.  Chronic kidney disease    Recommendations  Exercise stress echocardiogram  Check BMP, lipid panel, hemoglobin A1c  Tentative follow-up with JOLANTA in 1 month, if however no significant abnormalities are noted on exercise stress echocardiogram and patient wishes he can cancel the follow-up and we can see him back in 1 year, sooner if he notes any change in clinical status.    Orders Placed This Encounter   Procedures     Lipid Profile     ALT     Basic metabolic panel     Hemoglobin A1c     Basic metabolic panel     Lipid Profile     ALT     Follow-Up with Cardiac Advanced Practice Provider     EKG 12-lead complete w/read - Clinics (performed today)     Exercise Stress Echocardiogram       Orders Placed This Encounter   Medications     Cholecalciferol (VITAMIN D3) 25 MCG (1000 UT) CAPS     Zinc Sulfate (ZINC 15 PO)     Sig: Dose unknown     rosuvastatin (CRESTOR) 40 MG tablet     Sig: Take 1 tablet (40 mg) by mouth daily     Dispense:  90 tablet     Refill:  3     hydrochlorothiazide (HYDRODIURIL) 25 MG tablet     Sig: Take 1 tablet (25 mg) by mouth daily     Dispense:   90 tablet     Refill:  3       Medications Discontinued During This Encounter   Medication Reason     rosuvastatin (CRESTOR) 40 MG tablet Reorder     hydrochlorothiazide (HYDRODIURIL) 25 MG tablet Reorder         Encounter Diagnoses   Name Primary?     Mixed hyperlipidemia      Essential hypertension, benign      Atypical chest pain Yes     Coronary artery disease involving native coronary artery of native heart without angina pectoris      Other abnormal glucose         CURRENT MEDICATIONS:  Current Outpatient Medications   Medication Sig Dispense Refill     aspirin 81 MG tablet Take by mouth daily       Cholecalciferol (VITAMIN D3) 25 MCG (1000 UT) CAPS        coenzyme Q-10 200 MG CAPS Take 200 mg by mouth daily       DiphenhydrAMINE HCl, Sleep, 50 MG TABS Take 50 mg by mouth nightly as needed 14 tablet      fluticasone (FLONASE) 50 MCG/ACT spray Spray 1-2 sprays into both nostrils daily 3 Bottle 3     hydrochlorothiazide (HYDRODIURIL) 25 MG tablet Take 1 tablet (25 mg) by mouth daily 90 tablet 3     naproxen sodium (ALEVE) 220 MG tablet Take 220 mg by mouth as needed for moderate pain       nitroglycerin (NITROSTAT) 0.4 MG SL tablet Place under the tongue every 5 minutes as needed       rosuvastatin (CRESTOR) 40 MG tablet Take 1 tablet (40 mg) by mouth daily 90 tablet 3     Zinc Sulfate (ZINC 15 PO) Dose unknown       augmented betamethasone dipropionate (DIPROLENE-AF) 0.05 % external cream  (Patient not taking: Reported on 7/21/2021)       ciprofloxacin (CIPRO) 500 MG tablet Take 1 tablet (500 mg) by mouth 2 times daily (Patient not taking: Reported on 7/21/2021) 10 tablet 0     montelukast (SINGULAIR) 10 MG tablet Take 1 tablet (10 mg) by mouth At Bedtime (Patient not taking: Reported on 7/21/2021) 90 tablet 3       ALLERGIES     Allergies   Allergen Reactions     Amoxicillin      severe rash     Contrast Dye Hives     Patient states this was years ago and he believes he has had dye since that time without  "problems.     Imdur [Isosorbide] Other (See Comments)     headache     Lopressor [Metoprolol] Fatigue       PAST MEDICAL HISTORY:  Past Medical History:   Diagnosis Date     CAD (coronary artery disease)     cardiac cath : non-obstructive diffuse CAD      Chest pain     chronic stable     Chronic stable angina (H)      Contact dermatitis and other eczema, due to unspecified cause      DEPRESSIVE DISORDER NEC      Dyslipidemia      Fam hx-cardiovas dis NEC     Mother and Father     Hernia, abdominal      HTN (hypertension)      IRRITABLE COLON      Mild aortic stenosis      Mumps      Personal history of urinary calculi      Polyneuropathy      Skin cancer, basal cell 2008     SOB (shortness of breath)      Varicose veins        PAST SURGICAL HISTORY:  Past Surgical History:   Procedure Laterality Date     ANGIOGRAM  2015    Med Tx, no flow limiting lesions     C URETER ENDOSCOPY THRU URETEROSTOMY REMV FB OR CALCULUS      dr de la garza     COLONOSCOPY Left 2018    Procedure: COLONOSCOPY;  colonoscopy (fv)  ;  Surgeon: Nilesh Cervantes MD;  Location:  GI     CYSTOSCOPY       EYE EXAM ESTABLISHED PT       HC EXCISE VARICOCELE      \"cautery\" through intra venous approach     HC KNEE SCOPE, DIAGNOSTIC      Arthroscopy, Knee/left knee      HC REPAIR ING HERNIA,5+Y/O,REDUCIBL      Inguinal Hernia Repair  Right     TEST NOT FOUND      Per cut removal of L kidney stone     TESTICLE SURGERY  age 35    Varicocoele repair     VASECTOMY  age 30     ZZHC COLONOSCOPY THRU STOMA, DIAGNOSTIC         FAMILY HISTORY:  Family History   Problem Relation Age of Onset     Heart Disease Mother         91     Cancer - colorectal Mother      Heart Disease Father          70s     Diabetes Maternal Aunt      Alzheimer Disease No family hx of      Cancer No family hx of      Prostate Cancer No family hx of        SOCIAL HISTORY:  Social History     Socioeconomic History     Marital status:     "  Spouse name: Velia     Number of children: 2     Years of education: 18     Highest education level: None   Occupational History     Occupation: Consultant     Employer: S[employer]   Tobacco Use     Smoking status: Former Smoker     Types: Cigars     Quit date: 1975     Years since quittin.5     Smokeless tobacco: Never Used     Tobacco comment: 2-3 cigars   Substance and Sexual Activity     Alcohol use: Yes     Alcohol/week: 3.3 standard drinks     Types: 4 Glasses of wine per week     Comment: 1-2 glass of wine daily     Drug use: No     Sexual activity: Yes     Partners: Female     Birth control/protection: Surgical     Comment: vas   Other Topics Concern      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 Not Asked   Social History Narrative     None     Social Determinants of Health     Financial Resource Strain: Low Risk      Difficulty of Paying Living Expenses: Not hard at all   Food Insecurity: No Food Insecurity     Worried About Running Out of Food in the Last Year: Never true     Ran Out of Food in the Last Year: Never true   Transportation Needs: No Transportation Needs     Lack of Transportation (Medical): No     Lack of Transportation (Non-Medical): No   Physical Activity:      Days of Exercise per Week:      Minutes of Exercise per Session:    Stress:      Feeling of Stress :    Social Connections:      Frequency of Communication with Friends and Family:      Frequency of Social Gatherings with Friends and Family:      Attends [employer] Services:      Active Member of Clubs or Organizations:      Attends  Club or Organization Meetings:      Marital Status:    Intimate Partner Violence:      Fear of Current or Ex-Partner:      Emotionally Abused:      Physically Abused:      Sexually Abused:        Review of Systems:  Skin:  Negative       Eyes:  Positive for glasses    ENT:  Negative      Respiratory:  Negative       Cardiovascular:  Negative;palpitations;syncope or near-syncope;cyanosis;dizziness lower extremity symptoms;lightheadedness;fatigue;edema LE aches, mild chest discomfort, at rest, 2-3X week  Gastroenterology: Negative      Genitourinary:  Positive for urinary frequency;nocturia    Musculoskeletal:  Positive for joint pain    Neurologic:  Negative      Psychiatric:  Positive for sleep disturbances    Heme/Lymph/Imm:  Negative      Endocrine:  Negative        Physical Exam:  Vitals: /75 (BP Location: Right arm, Cuff Size: Adult Large)   Pulse 62   Ht 1.829 m (6')   Wt 83.4 kg (183 lb 12.8 oz)   BMI 24.93 kg/m      General patient appears comfortable  Neck normal JVP  Cardiovascular system S1-S2 normal no murmur rub or gallop  Respite system clear to auscultation  Extremities small varicose vein noted, no pedal edema, dorsalis pedis and tibials pulses 2+ bilaterally  Neurological alert, oriented      CC  No referring provider defined for this encounter.

## 2021-07-22 ENCOUNTER — CARE COORDINATION (OUTPATIENT)
Dept: CARDIOLOGY | Facility: CLINIC | Age: 76
End: 2021-07-22

## 2021-07-22 NOTE — PROGRESS NOTES
Blood work drawn after visit with Dr. Mata yesterday. See below. Note K 3.4. Cr, BUN, GFR somewhat worse than 1 year ago. A1C WDL.     Will review results with Dr. Mata and then contact pt via [employer].      Future Appointments   Date Time Provider Department Center   7/29/2021  1:00 PM [facility]   8/9/2021  1:50 PM Amber Hart, APRN CNP [facility]     ALINA SchwarzN, RN, PHN, HNB-BC   7/22/2021 at 8:30 AM      Results for ELODIA MACHADO (MRN [id number]) as of 7/22/2021 08:19   Ref. Range 7/21/2021 12:11   Sodium Latest Ref Range: 133 - 144 mmol/L 141   Potassium Latest Ref Range: 3.4 - 5.3 mmol/L 3.4   Chloride Latest Ref Range: 94 - 109 mmol/L 104   Carbon Dioxide Latest Ref Range: 20 - 32 mmol/L 31   Urea Nitrogen Latest Ref Range: 7 - 30 mg/dL 16   Creatinine Latest Ref Range: 0.66 - 1.25 mg/dL 1.33 (H)   GFR Estimate Latest Ref Range: >60 mL/min/1.73m2 52 (L)   Calcium Latest Ref Range: 8.5 - 10.1 mg/dL 9.7   Anion Gap Latest Ref Range: 3 - 14 mmol/L 6   ALT Latest Ref Range: 0 - 70 U/L 19   Hemoglobin A1C Latest Ref Range: 0.0 - 5.6 % 5.3   Cholesterol Latest Ref Range: <200 mg/dL 120   Patient Fasting > 8hrs? Unknown Yes   HDL Cholesterol Latest Ref Range: >=40 mg/dL 60   LDL Cholesterol Calculated Latest Ref Range: <=100 mg/dL 48   Non HDL Cholesterol Latest Ref Range: <130 mg/dL 60   Triglycerides Latest Ref Range: <150 mg/dL 60   Glucose Latest Ref Range: 70 - 99 mg/dL 100 (H)

## 2021-07-29 ENCOUNTER — TELEPHONE (OUTPATIENT)
Dept: CARDIOLOGY | Facility: CLINIC | Age: 76
End: 2021-07-29

## 2021-07-29 ENCOUNTER — HOSPITAL ENCOUNTER (OUTPATIENT)
Dept: CARDIOLOGY | Facility: CLINIC | Age: 76
Discharge: HOME | End: 2021-07-29
Attending: INTERNAL MEDICINE | Admitting: INTERNAL MEDICINE
Payer: COMMERCIAL

## 2021-07-29 DIAGNOSIS — R07.89 ATYPICAL CHEST PAIN: ICD-10-CM

## 2021-07-29 PROCEDURE — 93016 CV STRESS TEST SUPVJ ONLY: CPT | Performed by: INTERNAL MEDICINE

## 2021-07-29 PROCEDURE — 93018 CV STRESS TEST I&R ONLY: CPT | Performed by: INTERNAL MEDICINE

## 2021-07-29 PROCEDURE — 93325 DOPPLER ECHO COLOR FLOW MAPG: CPT | Mod: TC

## 2021-07-29 PROCEDURE — 93321 DOPPLER ECHO F-UP/LMTD STD: CPT | Mod: 26 | Performed by: INTERNAL MEDICINE

## 2021-07-29 PROCEDURE — 93325 DOPPLER ECHO COLOR FLOW MAPG: CPT | Mod: 26 | Performed by: INTERNAL MEDICINE

## 2021-07-29 PROCEDURE — 255N000002 HC RX 255 OP 636: Performed by: INTERNAL MEDICINE

## 2021-07-29 PROCEDURE — 93350 STRESS TTE ONLY: CPT | Mod: 26 | Performed by: INTERNAL MEDICINE

## 2021-07-29 RX ADMIN — HUMAN ALBUMIN MICROSPHERES AND PERFLUTREN 3 ML: 10; .22 INJECTION, SOLUTION INTRAVENOUS at 13:27

## 2021-07-29 NOTE — TELEPHONE ENCOUNTER
I left message with patient that I have good news in that his stress test from today is normal.    I told him that I will try reaching him tomorrow to review the test in more detail and see how he is doing.    [facility]

## 2021-07-30 NOTE — TELEPHONE ENCOUNTER
I called patient this morning to inform him that his stress test from yesterday is normal. I pointed out to him that he had a normal HR/BP response and that the ultrasound pictures showed his heart moving strongly. I told him that this means his chest symptoms are unlikely due to his heart and he agreed. He says its feels more muscle in nature.    He was pleased with the results and we mutually agreed to cancel the 8/9 return visit with Amber OTT.    I encouraged him to remain physically active and to call us anytime if he should develop any concerning symptoms. We will see him back in one year.    I will update Dr. Mata        .Interpretation Summary  The patient exercised 9:31 mm:ss on modified Kevyn protocol.  Exercise was stopped due to fatigue.  There was a normal BP response to exercise.  Normal resting wall motion and no stress-induced wall motion abnormality.  ______________________________________________________________________________  Stress  The patient exercised 9:31 mm:ss on modified Kevyn protocol.  RPP 20,636.  Exercise was stopped due to fatigue.  There was a normal BP response to exercise.  Target Heart Rate was achieved.  A low to moderate workload was achieved.  There was a maximum 1.5mm ST segment depression in the inferior lead(s).  The visual ejection fraction is estimated at >70%.  Left ventricular cavity size decreases with exercise.  Global LV systolic function augments with exercise.  Normal resting wall motion and no stress-induced wall motion abnormality.     Baseline  The patient is in normal sinus rhythm.  Normal left ventricular function and wall motion at rest.     Stress Results      Protocol:  Modified Kevyn Manual        Maximum Predicted HR:   144 bpm      Target HR: 122 bpm                      % Maximum Predicted HR: 93 %                             Stage  DurationHeart Rate  BP                                  (mm:ss)   (bpm)                            0      3:00      82     120/60                            1      3:00      96    124/60                            2      3:00      120   140/60                            3      0:31      134   154/60                        RecoveryR  6:00      84    120/60            Stress Duration:   9:31 mm:ss *        Recovery Time: 6:00 mm:ss         Maximum Stress HR: 134 bpm *           METS:          8

## 2021-08-09 ENCOUNTER — TRANSFERRED RECORDS (OUTPATIENT)
Dept: FAMILY MEDICINE | Facility: CLINIC | Age: 76
End: 2021-08-09

## 2021-09-30 DIAGNOSIS — J30.9 ALLERGIC RHINITIS, UNSPECIFIED SEASONALITY, UNSPECIFIED TRIGGER: ICD-10-CM

## 2021-09-30 RX ORDER — MONTELUKAST SODIUM 10 MG/1
TABLET ORAL
Qty: 90 TABLET | Refills: 0 | COMMUNITY
Start: 2021-09-30

## 2021-09-30 NOTE — TELEPHONE ENCOUNTER
Nilesh Dos Santos is requesting a refill of:    Refused Prescriptions:                       Disp   Refills    montelukast (SINGULAIR) 10 MG tablet [Phar*90 tab*0        Sig: TAKE 1 TABLET BY MOUTH AT BEDTIME  Refused By: SHERI ARAIZA  Reason for Refusal: Medication has been discontinued    Pt asked to remove from his chart on 07/21/21. Last med recheck was 08/20/20. Due for OV for refills.

## 2021-10-23 ENCOUNTER — HEALTH MAINTENANCE LETTER (OUTPATIENT)
Age: 76
End: 2021-10-23

## 2021-12-27 ENCOUNTER — OFFICE VISIT (OUTPATIENT)
Dept: FAMILY MEDICINE | Facility: CLINIC | Age: 76
End: 2021-12-27

## 2021-12-27 VITALS
TEMPERATURE: 97.1 F | SYSTOLIC BLOOD PRESSURE: 134 MMHG | RESPIRATION RATE: 20 BRPM | HEIGHT: 72 IN | BODY MASS INDEX: 24.27 KG/M2 | HEART RATE: 64 BPM | DIASTOLIC BLOOD PRESSURE: 68 MMHG | WEIGHT: 179.2 LBS

## 2021-12-27 DIAGNOSIS — K40.90 LEFT INGUINAL HERNIA: ICD-10-CM

## 2021-12-27 DIAGNOSIS — G89.29 CHRONIC RIGHT-SIDED LOW BACK PAIN WITHOUT SCIATICA: ICD-10-CM

## 2021-12-27 DIAGNOSIS — M79.661 PAIN IN BOTH LOWER LEGS: ICD-10-CM

## 2021-12-27 DIAGNOSIS — R39.15 URINARY URGENCY: ICD-10-CM

## 2021-12-27 DIAGNOSIS — N18.31 STAGE 3A CHRONIC KIDNEY DISEASE (H): ICD-10-CM

## 2021-12-27 DIAGNOSIS — I25.10 CORONARY ARTERY DISEASE INVOLVING NATIVE CORONARY ARTERY OF NATIVE HEART WITHOUT ANGINA PECTORIS: ICD-10-CM

## 2021-12-27 DIAGNOSIS — I10 ESSENTIAL HYPERTENSION, BENIGN: ICD-10-CM

## 2021-12-27 DIAGNOSIS — R07.9 CHEST PAIN, UNSPECIFIED TYPE: ICD-10-CM

## 2021-12-27 DIAGNOSIS — M79.662 PAIN IN BOTH LOWER LEGS: ICD-10-CM

## 2021-12-27 DIAGNOSIS — R20.2 PARESTHESIA: ICD-10-CM

## 2021-12-27 DIAGNOSIS — Z00.00 ROUTINE GENERAL MEDICAL EXAMINATION AT A HEALTH CARE FACILITY: Primary | ICD-10-CM

## 2021-12-27 DIAGNOSIS — M54.50 CHRONIC RIGHT-SIDED LOW BACK PAIN WITHOUT SCIATICA: ICD-10-CM

## 2021-12-27 DIAGNOSIS — E78.2 MIXED HYPERLIPIDEMIA: ICD-10-CM

## 2021-12-27 LAB
ALBUMIN SERPL-MCNC: 4.3 G/DL (ref 3.6–5.1)
ALBUMIN/GLOB SERPL: 1.7 {RATIO} (ref 1–2.5)
ALP SERPL-CCNC: 54 U/L (ref 33–130)
ALT 1742-6: 9 U/L (ref 0–32)
APPEARANCE UR: ABNORMAL
AST 1920-8: 11 U/L (ref 0–35)
BACTERIA, UR: ABNORMAL
BILIRUB SERPL-MCNC: 1.1 MG/DL (ref 0.2–1.2)
BILIRUB UR QL: ABNORMAL
BUN SERPL-MCNC: 16 MG/DL (ref 7–25)
BUN/CREATININE RATIO: 11.8 (ref 6–22)
CALCIUM SERPL-MCNC: 10.3 MG/DL (ref 8.6–10.3)
CASTS/LPF: ABNORMAL
CHLORIDE SERPLBLD-SCNC: 100.8 MMOL/L (ref 98–110)
CHOLEST SERPL-MCNC: 136 MG/DL (ref 0–199)
CHOLEST/HDLC SERPL: 2 {RATIO} (ref 0–5)
CO2 SERPL-SCNC: 30.7 MMOL/L (ref 20–32)
COLOR UR: YELLOW
CREAT SERPL-MCNC: 1.3 MG/DL (ref 0.6–1.3)
EP/HPF: ABNORMAL
GLOBULIN, CALCULATED - QUEST: 2.6 (ref 1.9–3.7)
GLUCOSE SERPL-MCNC: 112 MG/DL (ref 60–99)
GLUCOSE URINE: ABNORMAL MG/DL
HDLC SERPL-MCNC: 56 MG/DL (ref 40–150)
HGB UR QL: ABNORMAL
KETONES UR QL: ABNORMAL MG/DL
LDLC SERPL CALC-MCNC: 60 MG/DL (ref 0–130)
MISC.: ABNORMAL
NITRITE UR QL STRIP: ABNORMAL
PH UR STRIP: 7 PH (ref 5–7)
POTASSIUM SERPL-SCNC: 3.3 MMOL/L (ref 3.5–5.3)
PROT SERPL-MCNC: 6.9 G/DL (ref 6.1–8.1)
PROT UR QL: 100 MG/DL
RBC, UR MICRO: ABNORMAL (ref ?–2)
SODIUM SERPL-SCNC: 142.1 MMOL/L (ref 135–146)
SP GR UR STRIP: 1.02 (ref 1–1.03)
TRIGL SERPL-MCNC: 102 MG/DL (ref 0–149)
UROBILINOGEN UR QL STRIP: 0.2 EU/DL (ref 0.2–1)
WBC #/AREA URNS HPF: ABNORMAL /[HPF]
WBC, UR MICRO: ABNORMAL (ref ?–2)

## 2021-12-27 PROCEDURE — 36415 COLL VENOUS BLD VENIPUNCTURE: CPT | Performed by: FAMILY MEDICINE

## 2021-12-27 PROCEDURE — 80053 COMPREHEN METABOLIC PANEL: CPT | Performed by: FAMILY MEDICINE

## 2021-12-27 PROCEDURE — 80061 LIPID PANEL: CPT | Performed by: FAMILY MEDICINE

## 2021-12-27 PROCEDURE — 99397 PER PM REEVAL EST PAT 65+ YR: CPT | Performed by: FAMILY MEDICINE

## 2021-12-27 PROCEDURE — 99213 OFFICE O/P EST LOW 20 MIN: CPT | Mod: 25 | Performed by: FAMILY MEDICINE

## 2021-12-27 PROCEDURE — 81001 URINALYSIS AUTO W/SCOPE: CPT | Performed by: FAMILY MEDICINE

## 2021-12-27 RX ORDER — CIPROFLOXACIN 500 MG/1
500 TABLET, FILM COATED ORAL 2 TIMES DAILY
Qty: 14 TABLET | Refills: 0 | Status: SHIPPED | OUTPATIENT
Start: 2021-12-27 | End: 2022-01-24

## 2021-12-27 ASSESSMENT — MIFFLIN-ST. JEOR: SCORE: 1580.85

## 2021-12-27 NOTE — PROGRESS NOTES
3  SUBJECTIVE:   CC: Nilesh Dos Santos is an 76 year old male who presents for preventive health visit.       Patient has been advised of split billing requirements and indicates understanding: Yes  Healthy Habits:    Do you get at least three servings of calcium containing foods daily (dairy, green leafy vegetables, etc.)? yes    Amount of exercise or daily activities, outside of work: 1 day(s) per week    Problems taking medications regularly No    Medication side effects: No    Have you had an eye exam in the past two years? yes    Do you see a dentist twice per year? yes    Do you have sleep apnea, excessive snoring or daytime drowsiness?no  Pt noting increased urinary urgency, gets up 3 times per night to pee    Pt noting aching and numbness in both legs from knee to ankle-present many years, bother him when going to bed -pt saw vein specialist and was told veins OK    Pt noting increased chest pressure- has had for months but seems stable- sees cardiology regularly, last visit 7/21 with stress test, no shortness of breath , no diaphoeresis or clear exertional component-shovelling does not bother    Pt takes 3 ibuprofen most days to help with aches and pain-mainly low back and lower legs, back pain in right lower -some radiation to groin but not down leg, [id number] years without changes            Today's PHQ-2 Score:   PHQ-2 ( 1999 [employer]) 12/27/2021 5/13/2021   Q1: Little interest or pleasure in doing things 0 0   Q2: Feeling down, depressed or hopeless 0 0   PHQ-2 Score 0 0   PHQ-2 Total Score (12-17 Years)- Positive if 3 or more points; Administer PHQ-A if positive - 0   Q1: Little interest or pleasure in doing things - -   Q2: Feeling down, depressed or hopeless - -   PHQ-2 Score - -       Abuse: Current or Past(Physical, Sexual or Emotional)- No  Do you feel safe in your environment? Yes        Social History     Tobacco Use     Smoking status: Former Smoker     Types: Cigars     Quit date: 1/1/1975     Years  since quittin.0     Smokeless tobacco: Never Used     Tobacco comment: 2-3 cigars   Substance Use Topics     Alcohol use: Yes     Alcohol/week: 3.3 standard drinks     Types: 4 Glasses of wine per week     Comment: 1-2 glass of wine daily     If you drink alcohol do you typically have >3 drinks per day or >7 drinks per week? No                      Last PSA:   [facility]ott PSA   Date Value Ref Range Status   2020 0.2 < OR = 4.0 ng/mL Final     Comment:     The total PSA value from this assay system is   standardized against the WHO standard. The test   result will be approximately 20% lower when compared   to the equimolar-standardized total PSA (Olinda   Stanley). Comparison of serial PSA results should be   interpreted with this fact in mind.     This test was performed using the Siemens   chemiluminescent method. Values obtained from   different assay methods cannot be used  interchangeably. PSA levels, regardless of  value, should not be interpreted as absolute  evidence of the presence or absence of disease.         Reviewed orders with patient. Reviewed health maintenance and updated orders accordingly - Yes  BP Readings from Last 3 Encounters:   21 134/68   21 136/75   21 104/58    Wt Readings from Last 3 Encounters:   21 81.3 kg (179 lb 3.2 oz)   21 83.4 kg (183 lb 12.8 oz)   21 86.9 kg (191 lb 9.6 oz)                  Patient Active Problem List   Diagnosis     Calculus of kidney     Allergic rhinitis     Mixed hyperlipidemia     History of colonic polyps     Essential hypertension, benign     Health Care Home     ACP (advance care planning)     DJD (degenerative joint disease) of knee     Abnormal glucose     Varicose veins of both lower extremities with complications     Coronary artery disease involving native coronary artery of native heart without angina pectoris     Left inguinal hernia     Ascending aorta dilatation (H)     Mild aortic stenosis     CKD  "(chronic kidney disease) stage 3, GFR 30-59 ml/min (H)     Past Surgical History:   Procedure Laterality Date     ANGIOGRAM  2015    Med Tx, no flow limiting lesions     COLONOSCOPY Left 2018    Procedure: COLONOSCOPY;  colonoscopy (fv)  ;  Surgeon: Nilesh Cervantes MD;  Location:  GI     CYSTOSCOPY       EYE EXAM ESTABLISHED PT       HC EXCISE VARICOCELE      \"cautery\" through intra venous approach     HC KNEE SCOPE, DIAGNOSTIC      Arthroscopy, Knee/left knee      HC REPAIR ING HERNIA,5+Y/O,REDUCIBL      Inguinal Hernia Repair  Right     TEST NOT FOUND      Per cut removal of L kidney stone     TESTICLE SURGERY  age 35    Varicocoele repair     VASECTOMY  age 30     ZZC URETER ENDOSCOPY THRU URETEROSTOMY REMV FB OR CALCULUS      dr de la garza     ZZ COLONOSCOPY THRU STOMA, DIAGNOSTIC         Social History     Tobacco Use     Smoking status: Former Smoker     Types: Cigars     Quit date: 1975     Years since quittin.0     Smokeless tobacco: Never Used     Tobacco comment: 2-3 cigars   Substance Use Topics     Alcohol use: Yes     Alcohol/week: 3.3 standard drinks     Types: 4 Glasses of wine per week     Comment: 1-2 glass of wine daily     Family History   Problem Relation Age of Onset     Heart Disease Mother         91     Cancer - colorectal Mother      Heart Disease Father          70s     Diabetes Maternal Aunt      Alzheimer Disease No family hx of      Cancer No family hx of      Prostate Cancer No family hx of          Current Outpatient Medications   Medication Sig Dispense Refill     aspirin 81 MG tablet Take by mouth daily       Cholecalciferol (VITAMIN D3) 25 MCG (1000 UT) CAPS        coenzyme Q-10 200 MG CAPS Take 200 mg by mouth daily       DiphenhydrAMINE HCl, Sleep, 50 MG TABS Take 50 mg by mouth nightly as needed 14 tablet      fluticasone (FLONASE) 50 MCG/ACT spray Spray 1-2 sprays into both nostrils daily 3 Bottle 3     hydrochlorothiazide " (HYDRODIURIL) 25 MG tablet Take 1 tablet (25 mg) by mouth daily 90 tablet 3     naproxen sodium (ALEVE) 220 MG tablet Take 220 mg by mouth as needed for moderate pain       nitroglycerin (NITROSTAT) 0.4 MG SL tablet Place under the tongue every 5 minutes as needed       rosuvastatin (CRESTOR) 40 MG tablet Take 1 tablet (40 mg) by mouth daily 90 tablet 3     Turmeric (QC TUMERIC COMPLEX) 500 MG CAPS Take 3 capsules (1,500 mg) by mouth daily       Zinc Sulfate (ZINC 15 PO) Dose unknown       Allergies   Allergen Reactions     Amoxicillin      severe rash     Contrast Dye Hives     Patient states this was years ago and he believes he has had dye since that time without problems.     Imdur [Isosorbide] Other (See Comments)     headache     Lopressor [Metoprolol] Fatigue     Recent Labs   Lab Test 07/21/21  1211 08/20/20  0000 06/10/19  0836 05/04/18  1320 04/30/18  0958 04/19/18  0851   A1C 5.3  --   --  5.6  --   --    LDL 48 52 53  --   --  58   HDL 60 51 51  --   --  29*   TRIG 60 89 84  --   --  128   ALT 19  --  <5*  --   --  7   CR 1.33* 1.21* 1.57* 1.22   < > 1.28   GFRESTIMATED 52* 58 43* 58*   < > 55*   GFRESTBLACK  --   --  53* 70   < > 67   POTASSIUM 3.4 3.48* 2.9* 3.3*   < > 2.9*   TSH  --   --   --  0.71  --   --     < > = values in this interval not displayed.        Reviewed and updated as needed this visit by clinical staff  Tobacco   Meds  Problems  Med Hx  Surg Hx          Reviewed and updated as needed this visit by Provider               Past Medical History:   Diagnosis Date     CAD (coronary artery disease)     cardiac cath 2015: non-obstructive diffuse CAD      Chest pain     chronic stable     Chronic stable angina (H)      Contact dermatitis and other eczema, due to unspecified cause      DEPRESSIVE DISORDER NEC      Dyslipidemia      Fam hx-cardiovas dis NEC     Mother and Father     Hernia, abdominal      HTN (hypertension)      IRRITABLE COLON      Mild aortic stenosis      Mumps       "Personal history of urinary calculi      Polyneuropathy      Skin cancer, basal cell 2008     SOB (shortness of breath)      Varicose veins       Past Surgical History:   Procedure Laterality Date     ANGIOGRAM  11/17/2015    Med Tx, no flow limiting lesions     COLONOSCOPY Left 5/29/2018    Procedure: COLONOSCOPY;  colonoscopy (fv)  ;  Surgeon: Nilesh Cervantes MD;  Location:  GI     CYSTOSCOPY       EYE EXAM ESTABLISHED PT  2009     HC EXCISE VARICOCELE      \"cautery\" through intra venous approach     HC KNEE SCOPE, DIAGNOSTIC  2004    Arthroscopy, Knee/left knee      HC REPAIR ING HERNIA,5+Y/O,REDUCIBL  1990's    Inguinal Hernia Repair  Right     TEST NOT FOUND  2002    Per cut removal of L kidney stone     TESTICLE SURGERY  age 35    Varicocoele repair     VASECTOMY  age 30     ZZC URETER ENDOSCOPY THRU URETEROSTOMY REMV FB OR CALCULUS  2001    dr de la garza     ZZ COLONOSCOPY THRU STOMA, DIAGNOSTIC  2005       ROS:  CONSTITUTIONAL: NEGATIVE for fever, chills, change in weight  INTEGUMENTARY/SKIN: NEGATIVE for worrisome rashes, moles or lesions  EYES: NEGATIVE for vision changes or irritation  ENT: NEGATIVE for ear, mouth and throat problems  RESP: NEGATIVE for significant cough or SOB  GI: NEGATIVE for nausea, abdominal pain, heartburn, or change in bowel habits   male: negative for dysuria, hematuria, decreased urinary stream, erectile dysfunction, urethral discharge  NEURO: NEGATIVE for weakness, dizziness or paresthesias  ENDOCRINE: NEGATIVE for temperature intolerance, skin/hair changes  PSYCHIATRIC: NEGATIVE for changes in mood or affect    OBJECTIVE:   /68 (BP Location: Left arm, Patient Position: Chair, Cuff Size: Adult Regular)   Pulse 64   Temp 97.1  F (36.2  C)   Resp 20   Ht 1.829 m (6')   Wt 81.3 kg (179 lb 3.2 oz)   BMI 24.30 kg/m    EXAM:  GENERAL: healthy, alert and no distress  EYES: Eyes grossly normal to inspection, PERRL and conjunctivae and sclerae normal  HENT: ear canals " and TM's normal, nose and mouth without ulcers or lesions  NECK: no adenopathy, no asymmetry, masses, or scars and thyroid normal to palpation  RESP: lungs clear to auscultation - no rales, rhonchi or wheezes  CV: regular rate and rhythm, normal S1 S2, no S3 or S4, no murmur, click or rub, no peripheral edema and peripheral pulses strong  ABDOMEN: soft, nontender, no hepatosplenomegaly, no masses and bowel sounds normal   (male): normal male genitalia without lesions or urethral discharge, , pt has reducible left inguinal hernia  MS: no gross musculoskeletal defects noted, no edema    Neg straight leg-raise bilaterally  No spinous TTP    SKIN: no suspicious lesions or rashes  NEURO: Normal strength and tone, sensory exam grossly normal and mentation intact  PSYCH: mentation appears normal, affect normal/bright    Diagnostic Test Results:  Labs reviewed in Epic    ASSESSMENT/PLAN:   (Z00.00) Routine general medical examination at a health care facility  (primary encounter diagnosis)  Comment: discussed preventitive healthcare   Plan: Continue to work on healthy diet and exercise, discussed healthy habits     (E78.2) Mixed hyperlipidemia  Comment: control uncertain  Plan: Lipid Panel ([facility]), Comprehensive Metobolic         Panel ([facility]), VENOUS COLLECTION            (I10) Essential hypertension, benign  Comment: well controlled  Plan: Comprehensive Metobolic Panel ([facility]), VENOUS         COLLECTION        continue current medications at current doses     (I25.10) Coronary artery disease involving native coronary artery of native heart without angina pectoris  Comment: stable symptomatically   Plan: Comprehensive Metobolic Panel ([facility]), VENOUS         COLLECTION        continue current medications at current doses     (N18.31) Stage 3a chronic kidney disease (H)  Comment:   Plan: Comprehensive Metobolic Panel ([facility]), VENOUS         COLLECTION            (R39.15) Urinary urgency  Comment: UA shows  Large blood,culture  pending,   Plan: URINALYSIS, ROUTINE ([facility]), PSA Total ([facility])        Will treat with abx as this may represent infection, pending culture, if neg cx refer to urology, I reviewed the risks, benefits, and possible side effects of the medication.  The patient had an opportunity to ask any questions regarding the treatment plan. The patient was encouraged to call my office if any problems.     (M79.661,  M79.662) Pain in both lower legs  Comment: suspect neuropathy, may also have RLS-we agree it best to refer at this time  Plan: Adult Neurology Referral            (R20.2) Paresthesia  Comment: as above, likely neuropathy ut needs evaluation  Plan: Adult Neurology Referral            (R07.9) Chest pain, unspecified type  Comment: pt denies changes in atypical CP he has had for months -reviewed cardiology work up from 7/21, no acute red flags  Plan: recommend he contact cardiology if symptoms persist, patient given instructions to go to emergency department immediately if worsening of symptoms and verbalizes this understanding     (M54.50,  G89.29) Chronic right-sided low back pain without sciatica  Comment: suspect degenerative issues, will start by recommending core stretches to see if this may help  Plan: core exercises provided    (K40.90) Left inguinal hernia  Comment: pt has new hernia based on my exam-recommend surgery eval but he elects to wait for now  Plan: reviewed worrisome factors to watch for     Patient has been advised of split billing requirements and indicates understanding: Yes  COUNSELING:  Reviewed preventive health counseling, as reflected in patient instructions       Regular exercise       Healthy diet/nutrition       Vision screening       Colon cancer screening       Prostate cancer screening    Estimated body mass index is 24.3 kg/m  as calculated from the following:    Height as of this encounter: 1.829 m (6').    Weight as of this encounter: 81.3 kg (179 lb 3.2 oz).        He reports that he  quit smoking about 47 years ago. His smoking use included cigars. He has never used smokeless tobacco.      Counseling Resources:  ATP IV Guidelines  Pooled Cohorts Equation Calculator  FRAX Risk Assessment  ICSI Preventive Guidelines  Dietary Guidelines for Americans, 2010  USDA's MyPlate  ASA Prophylaxis  Lung CA Screening    Nilesh Guzman MD  [facility]

## 2021-12-27 NOTE — NURSING NOTE
Nilesh Dos Santos is here for a CPX.    Pre-visit planning  Immunizations -up to date  Colonoscopy -  Mammogram -  Asthma test --  PHQ9 -  BARTOLO 7 -  Hearing screen -    Questioned patient about current smoking habits.  Pt. quit smoking some time ago.  Body mass index is 24.3 kg/m .  PULSE regular  My Chart: active  CLASSIFICATION OF OVERWEIGHT AND OBESITY BY BMI                        Obesity Class           BMI(kg/m2)  Underweight                                    < 18.5  Normal                                         18.5-24.9  Overweight                                     25.0-29.9  OBESITY                     I                  30.0-34.9                             II                 35.0-39.9  EXTREME OBESITY             III                >40                            Patient's  BMI Body mass index is 24.3 kg/m .  :

## 2021-12-28 LAB — ABBOTT PSA - QUEST: 0.22 NG/ML

## 2021-12-29 LAB — URINE VOIDED CULTURE: NORMAL

## 2022-01-24 ENCOUNTER — OFFICE VISIT (OUTPATIENT)
Dept: FAMILY MEDICINE | Facility: CLINIC | Age: 77
End: 2022-01-24

## 2022-01-24 VITALS
HEART RATE: 76 BPM | WEIGHT: 179 LBS | SYSTOLIC BLOOD PRESSURE: 126 MMHG | DIASTOLIC BLOOD PRESSURE: 70 MMHG | RESPIRATION RATE: 20 BRPM | BODY MASS INDEX: 24.24 KG/M2 | TEMPERATURE: 97 F | HEIGHT: 72 IN

## 2022-01-24 DIAGNOSIS — R35.0 URINARY FREQUENCY: ICD-10-CM

## 2022-01-24 DIAGNOSIS — I10 ESSENTIAL HYPERTENSION, BENIGN: ICD-10-CM

## 2022-01-24 DIAGNOSIS — I25.10 CORONARY ARTERY DISEASE INVOLVING NATIVE CORONARY ARTERY OF NATIVE HEART WITHOUT ANGINA PECTORIS: ICD-10-CM

## 2022-01-24 DIAGNOSIS — Z71.89 ACP (ADVANCE CARE PLANNING): ICD-10-CM

## 2022-01-24 DIAGNOSIS — Z01.818 PREOPERATIVE EXAMINATION: Primary | ICD-10-CM

## 2022-01-24 DIAGNOSIS — R22.32 FINGER MASS, LEFT: ICD-10-CM

## 2022-01-24 LAB
% GRANULOCYTES: 32.1 %
APPEARANCE UR: ABNORMAL
BACTERIA, UR: ABNORMAL
BILIRUB UR QL: ABNORMAL
CASTS/LPF: ABNORMAL
COLOR UR: YELLOW
EP/HPF: ABNORMAL
GLUCOSE URINE: ABNORMAL MG/DL
HCT VFR BLD AUTO: 43.7 % (ref 40–53)
HEMOGLOBIN: 14.6 G/DL (ref 13.3–17.7)
HGB UR QL: ABNORMAL
KETONES UR QL: ABNORMAL MG/DL
LYMPHOCYTES NFR BLD AUTO: 62.1 %
MCH RBC QN AUTO: 34.2 PG (ref 26–33)
MCHC RBC AUTO-ENTMCNC: 33.4 G/DL (ref 31–36)
MCV RBC AUTO: 102.3 FL (ref 78–100)
MISC.: ABNORMAL
MONOCYTES NFR BLD AUTO: 5.8 %
NITRITE UR QL STRIP: ABNORMAL
PH UR STRIP: 7 PH (ref 5–7)
PLATELET COUNT - QUEST: 191 10^9/L (ref 150–375)
PROT UR QL: 100 MG/DL
RBC # BLD AUTO: 4.27 10*12/L (ref 4.4–5.9)
RBC, UR MICRO: ABNORMAL (ref ?–2)
SP GR UR STRIP: 1.02 (ref 1–1.03)
UROBILINOGEN UR QL STRIP: 2 EU/DL (ref 0.2–1)
WBC # BLD AUTO: 16.2 10*9/L (ref 4–11)
WBC #/AREA URNS HPF: ABNORMAL /[HPF]
WBC, UR MICRO: ABNORMAL (ref ?–2)

## 2022-01-24 PROCEDURE — 81001 URINALYSIS AUTO W/SCOPE: CPT | Performed by: FAMILY MEDICINE

## 2022-01-24 PROCEDURE — 99214 OFFICE O/P EST MOD 30 MIN: CPT | Performed by: FAMILY MEDICINE

## 2022-01-24 PROCEDURE — 85025 COMPLETE CBC W/AUTO DIFF WBC: CPT | Performed by: FAMILY MEDICINE

## 2022-01-24 RX ORDER — NITROFURANTOIN 25; 75 MG/1; MG/1
100 CAPSULE ORAL 2 TIMES DAILY
Qty: 14 CAPSULE | Refills: 0 | Status: SHIPPED | OUTPATIENT
Start: 2022-01-24 | End: 2022-11-16

## 2022-01-24 ASSESSMENT — MIFFLIN-ST. JEOR: SCORE: 1579.94

## 2022-01-24 NOTE — NURSING NOTE
Nilesh Dos Santos is here for a pre-op exam.    Questioned patient about current smoking habits.  Pt. quit smoking some time ago.  PULSE regular  My Chart: active  CLASSIFICATION OF OVERWEIGHT AND OBESITY BY BMI                        Obesity Class           BMI(kg/m2)  Underweight                                    < 18.5  Normal                                         18.5-24.9  Overweight                                     25.0-29.9  OBESITY                     I                  30.0-34.9                             II                 35.0-39.9  EXTREME OBESITY             III                >40                            Patient's  BMI Body mass index is 24.28 kg/m .  http://hin.nhlbi.nih.gov/menuplanner/menu.cgi  Pre-visit planning  Immunizations - up to date  Colonoscopy -   Mammogram -   Asthma -   PHQ9 -    BARTOLO-7 -    Hearing Test -

## 2022-01-24 NOTE — LETTER
[facility]  [address] [place] [address]  [address]  [facility] [address]  Phone: [phone]  Fax: [phone]  Primary Provider: Mira Guzman  Pre-op Performing Provider: MIRA GUZMAN      PREOPERATIVE EVALUATION:  Today's date: 1/24/2022    Mira Dos Santos is a 76 year old male who presents for a preoperative evaluation.    Surgical Information:  Surgery/Procedure: left hand, ring finger mass  Surgery Location: [facility]  Surgeon: Dr Baer  Surgery Date: 1/26/22  Time of Surgery: am  Where patient plans to recover: At home with family  Fax number for surgical facility:     Type of Anesthesia Anticipated: to be determined    Assessment & Plan     The proposed surgical procedure is considered INTERMEDIATE risk.    Preoperative examination      Finger mass, left  index    Essential hypertension, benign  Well controlled    Coronary artery disease involving native coronary artery of native heart without angina pectoris  stable symptomatically , normal stress echo 7/21    Urinary Frequency- UA suspicious for infection-has had this occur in past with culture negative but given slight increase in WBC and UA findings will treat with abx      Risks and Recommendations:  The patient has the following additional risks and recommendations for perioperative complications:   - urine issues as noted    Medication Instructions:  Patient to take hydrochlorothiazide on morning of surgery, others when home    Stop ASA today (had not been told prior so will only be iff 2 days)      RECOMMENDATION:  APPROVAL GIVEN to proceed with proposed procedure, without further diagnostic evaluation.    Review of external notes as documented above   Review of the result(s) of each unique test - labs, recent stress echo                  Subjective     1. No - Have you ever had a heart attack or stroke?  2. No - Have you ever had surgery on your heart or blood vessels, such as a stent, coronary (heart) bypass,  or surgery on an artery in the head, neck, heart, or legs?  3. No - Do you have chest pain when you are physically active?  4. No - Do you have a history of heart failure?  5. No - Do you currently have a cold, bronchitis, or symptoms of other respiratory (head and chest) infections?  6. No - Do you have a cough, shortness of breath, or wheezing?  7. No - Do you or anyone in your family have a history of blood clots?  8. No - Do you or anyone in your family have a serious bleeding problem, such as long-lasting bleeding after surgeries or cuts?  9. No - Have you ever had anemia or been told to take iron pills?  10. No - Have you had any abnormal blood loss such as black, tarry or bloody stools, or abnormal vaginal bleeding?  11. No - Have you ever had a blood transfusion?  12. Yes - Are you willing to have a blood transfusion if it is medically needed before, during, or after your surgery?  13. No - Have you or anyone in your family ever had problems with anesthesia (sedation for surgery)?  14. No - Do you have sleep apnea, excessive snoring, or daytime drowsiness?   15. No - Do you have any artifical heart valves or other implanted medical devices, such as a pacemaker, defibrillator, or continuous glucose monitor?  16. No - Do you have any artifical joints?  17. No - Are you allergic to latex?  18. No - Is there any chance that you may be pregnant?      No flowsheet data found.    Health Care Directive:  Patient does not have a Health Care Directive or Living Will: Discussed advance care planning with patient; information given to patient to review.    Preoperative Review of :   reviewed - no current rx      Status of Chronic Conditions:  See problem list for active medical problems.  Problems all longstanding and stable, except as noted/documented.  See ROS for pertinent symptoms related to these conditions.    CAD - Patient has a longstanding history of moderate-severe CAD. Patient denies recent chest pain  or NTG use, denies exercise induced dyspnea or PND. Last Stress test 7/21/21.       Review of Systems  CONSTITUTIONAL: NEGATIVE for fever, chills, change in weight  ENT/MOUTH: NEGATIVE for ear, mouth and throat problems  RESP: NEGATIVE for significant cough or SOB  CV: NEGATIVE for chest pain, palpitations or peripheral edema  PSYCHIATRIC: NEGATIVE for changes in mood or affect    Patient Active Problem List    Diagnosis Date Noted     CKD (chronic kidney disease) stage 3, GFR 30-59 ml/min (H) 08/20/2020     Priority: Medium     Mild aortic stenosis      Priority: Medium     Ascending aorta dilatation (H) 05/06/2018     Priority: Medium     Coronary artery disease involving native coronary artery of native heart without angina pectoris 05/09/2017     Priority: Medium     cardiac cath 2015: non-obstructive diffuse CAD        Left inguinal hernia 05/09/2017     Priority: Medium     Varicose veins of both lower extremities with complications      Priority: Medium     Abnormal glucose 06/18/2013     Priority: Medium     Problem list name updated by automated process. Provider to review       DJD (degenerative joint disease) of knee 06/17/2013     Priority: Medium     Right, improved with injection by Dr Celestin       Essential hypertension, benign 06/24/2008     Priority: Medium     History of colonic polyps 03/29/2005     Priority: Medium     Last colonoscopy 2/2001  DR. Spangler  Problem list name updated by automated process. Provider to review       Mixed hyperlipidemia      Priority: Medium     Calculus of kidney      Priority: Medium     Allergic rhinitis      Priority: Medium     Problem list name updated by automated process. Provider to review       ACP (advance care planning) 11/28/2012     Priority: Low     Discussed advance care planning with patient; information given to patient to review. 11/28/2012   Advance Care Planning 5/9/2017: ACP Review of Chart / Resources Provided:  Reviewed chart for advance  "care plan.  Nilesh GABRIELE Dos Santos has no plan or code status on file. Discussed available resources and provided with information.   Added by Charisse [facility] 11/23/2012     Priority: Low     State Tier Level:  Tier 1  Status:  N/A  Care Coordinator:  N/A    See Letters for [facility] Care Plan            Past Medical History:   Diagnosis Date     CAD (coronary artery disease)     cardiac cath 2015: non-obstructive diffuse CAD      Chest pain     chronic stable     Chronic stable angina (H)      Contact dermatitis and other eczema, due to unspecified cause      DEPRESSIVE DISORDER NEC      Dyslipidemia      Fam hx-cardiovas dis NEC     Mother and Father     Hernia, abdominal      HTN (hypertension)      IRRITABLE COLON      Mild aortic stenosis      Mumps      Personal history of urinary calculi      Polyneuropathy      Skin cancer, basal cell 2008     SOB (shortness of breath)      Varicose veins      Past Surgical History:   Procedure Laterality Date     ANGIOGRAM  11/17/2015    Med Tx, no flow limiting lesions     COLONOSCOPY Left 5/29/2018    Procedure: COLONOSCOPY;  colonoscopy (fv)  ;  Surgeon: Nilesh Cervantes MD;  Location:  GI     CYSTOSCOPY       EYE EXAM ESTABLISHED PT  2009      EXCISE VARICOCELE      \"cautery\" through intra venous approach     HC KNEE SCOPE, DIAGNOSTIC  2004    Arthroscopy, Knee/left knee      HC REPAIR ING HERNIA,5+Y/O,REDUCIBL  1990's    Inguinal Hernia Repair  Right     TEST NOT FOUND  2002    Per cut removal of L kidney stone     TESTICLE SURGERY  age 35    Varicocoele repair     VASECTOMY  age 30     ZZC URETER ENDOSCOPY THRU URETEROSTOMY REMV FB OR CALCULUS  2001    dr de la garza     [facility] COLONOSCOPY THRU STOMA, DIAGNOSTIC  2005     Current Outpatient Medications   Medication Sig Dispense Refill     aspirin 81 MG tablet Take by mouth daily       Cholecalciferol (VITAMIN D3) 25 MCG (1000 UT) CAPS        coenzyme Q-10 200 MG CAPS Take 200 mg by mouth daily       " DiphenhydrAMINE HCl, Sleep, 50 MG TABS Take 50 mg by mouth nightly as needed 14 tablet      fluticasone (FLONASE) 50 MCG/ACT spray Spray 1-2 sprays into both nostrils daily 3 Bottle 3     hydrochlorothiazide (HYDRODIURIL) 25 MG tablet Take 1 tablet (25 mg) by mouth daily 90 tablet 3     nitroglycerin (NITROSTAT) 0.4 MG SL tablet Place under the tongue every 5 minutes as needed       rosuvastatin (CRESTOR) 40 MG tablet Take 1 tablet (40 mg) by mouth daily 90 tablet 3     Turmeric (QC TUMERIC COMPLEX) 500 MG CAPS Take 3 capsules (1,500 mg) by mouth daily       Zinc Sulfate (ZINC 15 PO) Dose unknown       naproxen sodium (ALEVE) 220 MG tablet Take 220 mg by mouth as needed for moderate pain         Allergies   Allergen Reactions     Amoxicillin      severe rash     Contrast Dye Hives     Patient states this was years ago and he believes he has had dye since that time without problems.     Imdur [Isosorbide] Other (See Comments)     headache     Lopressor [Metoprolol] Fatigue        Social History     Tobacco Use     Smoking status: Former Smoker     Types: Cigars     Quit date: 1975     Years since quittin.0     Smokeless tobacco: Never Used     Tobacco comment: 2-3 cigars   Substance Use Topics     Alcohol use: Yes     Alcohol/week: 3.3 standard drinks     Types: 4 Glasses of wine per week     Comment: 1-2 glass of wine daily     Family History   Problem Relation Age of Onset     Heart Disease Mother         91     Cancer - colorectal Mother      Heart Disease Father          70s     Diabetes Maternal Aunt      Alzheimer Disease No family hx of      Cancer No family hx of      Prostate Cancer No family hx of      History   Drug Use No         Objective     Resp 20   Ht 1.829 m (6')   Wt 81.2 kg (179 lb)   BMI 24.28 kg/m      Physical Exam  GENERAL APPEARANCE: healthy, alert and no distress  HENT: ear canals and TM's normal and nose and mouth without ulcers or lesions  RESP: lungs clear to auscultation  - no rales, rhonchi or wheezes  CV: regular rate and rhythm, normal S1 S2, no S3 or S4 and no murmur, click or rub   ABDOMEN: soft, nontender, no HSM or masses and bowel sounds normal  NEURO: Normal strength and tone, sensory exam grossly normal, mentation intact and speech normal    Recent Labs   Lab Test 12/27/21  1444 07/21/21  1211 08/20/20  0951   HGB  --   --  14.7   .1 141  --    POTASSIUM 3.30* 3.4  --    CR 1.30 1.33*  --    A1C  --  5.3  --         Diagnostics:  Recent Results (from the past 24 hour(s))   HEMOGRAM PLATELET DIFF (BFP)    Collection Time: 01/24/22 12:00 AM   Result Value Ref Range    WBC 16.2 (A) 4.0 - 11 10*9/L    RBC Count 4.27 (A) 4.4 - 5.9 10*12/L    Hemoglobin 14.6 13.3 - 17.7 g/dL    Hematocrit 43.7 40.0 - 53.0 %    .3 (A) 78 - 100 fL    MCH 34.2 (A) 26 - 33 pg    MCHC 33.4 31 - 36 g/dL    Platelet Count 191 150 - 375 10^9/L    % Granulocytes 32.1 %    % Lymphocytes 62.1 %    % Monocytes 5.8 %   URINALYSIS, ROUTINE (BFP)    Collection Time: 01/24/22 12:00 AM   Result Value Ref Range    Color Urine Yellow     Appearance Urine Slightly Cloudy (A)     Glucose Urine Neg neg mg/dL    Bilirubin Urine Neg neg    Ketones Urine Neg neg mg/dL    Specific Gravity Urine 1.025 1.003 - 1.035    Blood Urine Small (A) neg    pH Urine 7.0 5.0 - 7.0 pH    Protein Urine 100 (A) neg - neg mg/dL    Urobilinogen Urine 2  (A) 0.2 - 1.0 EU/dL    Nitrite Urine Neg NEG    Leukocytes trace (A)     Wbc, Urine Micro 5-10 (A) neg - 2    RBC Micro Urine 3-5 (A) neg - 2    EP/HPF neg     Bacteria Urine mod (A) neg - neg    Casts/LPF neg     Miscellaneous neg       No EKG this visit, completed stress echo 7/21, no new symptoms     Revised Cardiac Risk Index (RCRI):  The patient has the following serious cardiovascular risks for perioperative complications:   - No serious cardiac risks = 0 points     RCRI Interpretation: 0 points: Class I (very low risk - 0.4% complication rate)           Signed  Electronically by: Nilesh Guzman MD  Copy of this evaluation report is provided to requesting physician.

## 2022-01-24 NOTE — PROGRESS NOTES
[facility]  [address] [place] [address]  [address]  [facility] [address]  Phone: [phone]  Fax: [phone]  Primary Provider: Mira Guzman  Pre-op Performing Provider: MIRA GUZMAN      PREOPERATIVE EVALUATION:  Today's date: 1/24/2022    Mira Dos Santos is a 76 year old male who presents for a preoperative evaluation.    Surgical Information:  Surgery/Procedure: left hand, ring finger mass  Surgery Location: [facility]  Surgeon: Dr Baer  Surgery Date: 1/26/22  Time of Surgery: am  Where patient plans to recover: At home with family  Fax number for surgical facility:     Type of Anesthesia Anticipated: to be determined    Assessment & Plan     The proposed surgical procedure is considered INTERMEDIATE risk.    Preoperative examination      Finger mass, left  index    Essential hypertension, benign  Well controlled    Coronary artery disease involving native coronary artery of native heart without angina pectoris  stable symptomatically , normal stress echo 7/21    Urinary Frequency- UA suspicious for infection-has had this occur in past with culture negative but given slight increase in WBC and UA findings will treat with abx      Risks and Recommendations:  The patient has the following additional risks and recommendations for perioperative complications:   - urine issues as noted    Medication Instructions:  Patient to take hydrochlorothiazide on morning of surgery, others when home    Stop ASA today (had not been told prior so will only be iff 2 days)      RECOMMENDATION:  APPROVAL GIVEN to proceed with proposed procedure, without further diagnostic evaluation.    Review of external notes as documented above   Review of the result(s) of each unique test - labs, recent stress echo                  Subjective     1. No - Have you ever had a heart attack or stroke?  2. No - Have you ever had surgery on your heart or blood vessels, such as a stent, coronary (heart) bypass,  or surgery on an artery in the head, neck, heart, or legs?  3. No - Do you have chest pain when you are physically active?  4. No - Do you have a history of heart failure?  5. No - Do you currently have a cold, bronchitis, or symptoms of other respiratory (head and chest) infections?  6. No - Do you have a cough, shortness of breath, or wheezing?  7. No - Do you or anyone in your family have a history of blood clots?  8. No - Do you or anyone in your family have a serious bleeding problem, such as long-lasting bleeding after surgeries or cuts?  9. No - Have you ever had anemia or been told to take iron pills?  10. No - Have you had any abnormal blood loss such as black, tarry or bloody stools, or abnormal vaginal bleeding?  11. No - Have you ever had a blood transfusion?  12. Yes - Are you willing to have a blood transfusion if it is medically needed before, during, or after your surgery?  13. No - Have you or anyone in your family ever had problems with anesthesia (sedation for surgery)?  14. No - Do you have sleep apnea, excessive snoring, or daytime drowsiness?   15. No - Do you have any artifical heart valves or other implanted medical devices, such as a pacemaker, defibrillator, or continuous glucose monitor?  16. No - Do you have any artifical joints?  17. No - Are you allergic to latex?  18. No - Is there any chance that you may be pregnant?      No flowsheet data found.    Health Care Directive:  Patient does not have a Health Care Directive or Living Will: Discussed advance care planning with patient; information given to patient to review.    Preoperative Review of :   reviewed - no current rx      Status of Chronic Conditions:  See problem list for active medical problems.  Problems all longstanding and stable, except as noted/documented.  See ROS for pertinent symptoms related to these conditions.    CAD - Patient has a longstanding history of moderate-severe CAD. Patient denies recent chest pain  or NTG use, denies exercise induced dyspnea or PND. Last Stress test 7/21/21.       Review of Systems  CONSTITUTIONAL: NEGATIVE for fever, chills, change in weight  ENT/MOUTH: NEGATIVE for ear, mouth and throat problems  RESP: NEGATIVE for significant cough or SOB  CV: NEGATIVE for chest pain, palpitations or peripheral edema  PSYCHIATRIC: NEGATIVE for changes in mood or affect    Patient Active Problem List    Diagnosis Date Noted     CKD (chronic kidney disease) stage 3, GFR 30-59 ml/min (H) 08/20/2020     Priority: Medium     Mild aortic stenosis      Priority: Medium     Ascending aorta dilatation (H) 05/06/2018     Priority: Medium     Coronary artery disease involving native coronary artery of native heart without angina pectoris 05/09/2017     Priority: Medium     cardiac cath 2015: non-obstructive diffuse CAD        Left inguinal hernia 05/09/2017     Priority: Medium     Varicose veins of both lower extremities with complications      Priority: Medium     Abnormal glucose 06/18/2013     Priority: Medium     Problem list name updated by automated process. Provider to review       DJD (degenerative joint disease) of knee 06/17/2013     Priority: Medium     Right, improved with injection by Dr Celestin       Essential hypertension, benign 06/24/2008     Priority: Medium     History of colonic polyps 03/29/2005     Priority: Medium     Last colonoscopy 2/2001  DR. Spangler  Problem list name updated by automated process. Provider to review       Mixed hyperlipidemia      Priority: Medium     Calculus of kidney      Priority: Medium     Allergic rhinitis      Priority: Medium     Problem list name updated by automated process. Provider to review       ACP (advance care planning) 11/28/2012     Priority: Low     Discussed advance care planning with patient; information given to patient to review. 11/28/2012   Advance Care Planning 5/9/2017: ACP Review of Chart / Resources Provided:  Reviewed chart for advance  "care plan.  Nilesh GABRIELE Dos Santos has no plan or code status on file. Discussed available resources and provided with information.   Added by Charisse [facility] 11/23/2012     Priority: Low     State Tier Level:  Tier 1  Status:  N/A  Care Coordinator:  N/A    See Letters for [facility] Care Plan            Past Medical History:   Diagnosis Date     CAD (coronary artery disease)     cardiac cath 2015: non-obstructive diffuse CAD      Chest pain     chronic stable     Chronic stable angina (H)      Contact dermatitis and other eczema, due to unspecified cause      DEPRESSIVE DISORDER NEC      Dyslipidemia      Fam hx-cardiovas dis NEC     Mother and Father     Hernia, abdominal      HTN (hypertension)      IRRITABLE COLON      Mild aortic stenosis      Mumps      Personal history of urinary calculi      Polyneuropathy      Skin cancer, basal cell 2008     SOB (shortness of breath)      Varicose veins      Past Surgical History:   Procedure Laterality Date     ANGIOGRAM  11/17/2015    Med Tx, no flow limiting lesions     COLONOSCOPY Left 5/29/2018    Procedure: COLONOSCOPY;  colonoscopy (fv)  ;  Surgeon: Nilesh Cervantes MD;  Location:  GI     CYSTOSCOPY       EYE EXAM ESTABLISHED PT  2009      EXCISE VARICOCELE      \"cautery\" through intra venous approach     HC KNEE SCOPE, DIAGNOSTIC  2004    Arthroscopy, Knee/left knee      HC REPAIR ING HERNIA,5+Y/O,REDUCIBL  1990's    Inguinal Hernia Repair  Right     TEST NOT FOUND  2002    Per cut removal of L kidney stone     TESTICLE SURGERY  age 35    Varicocoele repair     VASECTOMY  age 30     ZZC URETER ENDOSCOPY THRU URETEROSTOMY REMV FB OR CALCULUS  2001    dr de la garza     [facility] COLONOSCOPY THRU STOMA, DIAGNOSTIC  2005     Current Outpatient Medications   Medication Sig Dispense Refill     aspirin 81 MG tablet Take by mouth daily       Cholecalciferol (VITAMIN D3) 25 MCG (1000 UT) CAPS        coenzyme Q-10 200 MG CAPS Take 200 mg by mouth daily       " DiphenhydrAMINE HCl, Sleep, 50 MG TABS Take 50 mg by mouth nightly as needed 14 tablet      fluticasone (FLONASE) 50 MCG/ACT spray Spray 1-2 sprays into both nostrils daily 3 Bottle 3     hydrochlorothiazide (HYDRODIURIL) 25 MG tablet Take 1 tablet (25 mg) by mouth daily 90 tablet 3     nitroglycerin (NITROSTAT) 0.4 MG SL tablet Place under the tongue every 5 minutes as needed       rosuvastatin (CRESTOR) 40 MG tablet Take 1 tablet (40 mg) by mouth daily 90 tablet 3     Turmeric (QC TUMERIC COMPLEX) 500 MG CAPS Take 3 capsules (1,500 mg) by mouth daily       Zinc Sulfate (ZINC 15 PO) Dose unknown       naproxen sodium (ALEVE) 220 MG tablet Take 220 mg by mouth as needed for moderate pain         Allergies   Allergen Reactions     Amoxicillin      severe rash     Contrast Dye Hives     Patient states this was years ago and he believes he has had dye since that time without problems.     Imdur [Isosorbide] Other (See Comments)     headache     Lopressor [Metoprolol] Fatigue        Social History     Tobacco Use     Smoking status: Former Smoker     Types: Cigars     Quit date: 1975     Years since quittin.0     Smokeless tobacco: Never Used     Tobacco comment: 2-3 cigars   Substance Use Topics     Alcohol use: Yes     Alcohol/week: 3.3 standard drinks     Types: 4 Glasses of wine per week     Comment: 1-2 glass of wine daily     Family History   Problem Relation Age of Onset     Heart Disease Mother         91     Cancer - colorectal Mother      Heart Disease Father          70s     Diabetes Maternal Aunt      Alzheimer Disease No family hx of      Cancer No family hx of      Prostate Cancer No family hx of      History   Drug Use No         Objective     Resp 20   Ht 1.829 m (6')   Wt 81.2 kg (179 lb)   BMI 24.28 kg/m    B/P: 126/70, T: 97, P: 76, R: 20    Physical Exam  GENERAL APPEARANCE: healthy, alert and no distress  HENT: ear canals and TM's normal and nose and mouth without ulcers or  lesions  RESP: lungs clear to auscultation - no rales, rhonchi or wheezes  CV: regular rate and rhythm, normal S1 S2, no S3 or S4 and no murmur, click or rub   ABDOMEN: soft, nontender, no HSM or masses and bowel sounds normal  NEURO: Normal strength and tone, sensory exam grossly normal, mentation intact and speech normal    Recent Labs   Lab Test 12/27/21  1444 07/21/21  1211 08/20/20  0951   HGB  --   --  14.7   .1 141  --    POTASSIUM 3.30* 3.4  --    CR 1.30 1.33*  --    A1C  --  5.3  --         Diagnostics:  Recent Results (from the past 24 hour(s))   HEMOGRAM PLATELET DIFF (BFP)    Collection Time: 01/24/22 12:00 AM   Result Value Ref Range    WBC 16.2 (A) 4.0 - 11 10*9/L    RBC Count 4.27 (A) 4.4 - 5.9 10*12/L    Hemoglobin 14.6 13.3 - 17.7 g/dL    Hematocrit 43.7 40.0 - 53.0 %    .3 (A) 78 - 100 fL    MCH 34.2 (A) 26 - 33 pg    MCHC 33.4 31 - 36 g/dL    Platelet Count 191 150 - 375 10^9/L    % Granulocytes 32.1 %    % Lymphocytes 62.1 %    % Monocytes 5.8 %   URINALYSIS, ROUTINE (BFP)    Collection Time: 01/24/22 12:00 AM   Result Value Ref Range    Color Urine Yellow     Appearance Urine Slightly Cloudy (A)     Glucose Urine Neg neg mg/dL    Bilirubin Urine Neg neg    Ketones Urine Neg neg mg/dL    Specific Gravity Urine 1.025 1.003 - 1.035    Blood Urine Small (A) neg    pH Urine 7.0 5.0 - 7.0 pH    Protein Urine 100 (A) neg - neg mg/dL    Urobilinogen Urine 2  (A) 0.2 - 1.0 EU/dL    Nitrite Urine Neg NEG    Leukocytes trace (A)     Wbc, Urine Micro 5-10 (A) neg - 2    RBC Micro Urine 3-5 (A) neg - 2    EP/HPF neg     Bacteria Urine mod (A) neg - neg    Casts/LPF neg     Miscellaneous neg       No EKG this visit, completed stress echo 7/21, no new symptoms     Revised Cardiac Risk Index (RCRI):  The patient has the following serious cardiovascular risks for perioperative complications:   - No serious cardiac risks = 0 points     RCRI Interpretation: 0 points: Class I (very low risk - 0.4%  complication rate)           Signed Electronically by: Nilesh Guzman MD  Copy of this evaluation report is provided to requesting physician.

## 2022-06-08 DIAGNOSIS — E78.2 MIXED HYPERLIPIDEMIA: ICD-10-CM

## 2022-06-08 DIAGNOSIS — I10 ESSENTIAL HYPERTENSION, BENIGN: ICD-10-CM

## 2022-06-08 RX ORDER — HYDROCHLOROTHIAZIDE 25 MG/1
25 TABLET ORAL DAILY
Qty: 90 TABLET | Refills: 0 | Status: SHIPPED | OUTPATIENT
Start: 2022-06-08 | End: 2022-08-31

## 2022-06-08 RX ORDER — ROSUVASTATIN CALCIUM 40 MG/1
40 TABLET, COATED ORAL DAILY
Qty: 90 TABLET | Refills: 0 | Status: SHIPPED | OUTPATIENT
Start: 2022-06-08 | End: 2022-08-31

## 2022-08-31 DIAGNOSIS — I10 ESSENTIAL HYPERTENSION, BENIGN: ICD-10-CM

## 2022-08-31 DIAGNOSIS — E78.2 MIXED HYPERLIPIDEMIA: ICD-10-CM

## 2022-08-31 RX ORDER — ROSUVASTATIN CALCIUM 40 MG/1
40 TABLET, COATED ORAL DAILY
Qty: 90 TABLET | Refills: 1 | Status: SHIPPED | OUTPATIENT
Start: 2022-08-31 | End: 2022-12-07

## 2022-08-31 RX ORDER — HYDROCHLOROTHIAZIDE 25 MG/1
25 TABLET ORAL DAILY
Qty: 90 TABLET | Refills: 1 | Status: SHIPPED | OUTPATIENT
Start: 2022-08-31 | End: 2022-12-07

## 2022-08-31 NOTE — TELEPHONE ENCOUNTER
Received refill request for:  Hydrochlorothiazide, Rosuvastatin   Last OV was: 21 Dr. Mata  Labs/EK21 BMP/Lipzan  F/U scheduled:   Future Appointments   Date Time Provider Department Center   2023  1:30 PM RU LAB [facility]   2023  9:45 AM Bharat Mata MD [facility] CLIN     Pharmacy: [facility] Refill Guideline reviewed.  Medication meets criteria for refill.    Sybil Torres RN, BSN  22 at 3:45 PM

## 2022-10-09 ENCOUNTER — HEALTH MAINTENANCE LETTER (OUTPATIENT)
Age: 77
End: 2022-10-09

## 2022-11-02 ENCOUNTER — TRANSFERRED RECORDS (OUTPATIENT)
Dept: FAMILY MEDICINE | Facility: CLINIC | Age: 77
End: 2022-11-02

## 2022-11-16 ENCOUNTER — OFFICE VISIT (OUTPATIENT)
Dept: FAMILY MEDICINE | Facility: CLINIC | Age: 77
End: 2022-11-16

## 2022-11-16 VITALS
DIASTOLIC BLOOD PRESSURE: 82 MMHG | HEART RATE: 73 BPM | WEIGHT: 191 LBS | OXYGEN SATURATION: 97 % | HEIGHT: 72 IN | SYSTOLIC BLOOD PRESSURE: 128 MMHG | TEMPERATURE: 97.9 F | BODY MASS INDEX: 25.87 KG/M2

## 2022-11-16 DIAGNOSIS — R05.8 OTHER SPECIFIED COUGH: Primary | ICD-10-CM

## 2022-11-16 DIAGNOSIS — J10.1 INFLUENZA A: ICD-10-CM

## 2022-11-16 LAB
COVID-19: NEGATIVE
FLUAV AG UPPER RESP QL IA.RAPID: ABNORMAL
FLUBV AG UPPER RESP QL IA.RAPID: ABNORMAL

## 2022-11-16 PROCEDURE — 87635 SARS-COV-2 COVID-19 AMP PRB: CPT | Performed by: FAMILY MEDICINE

## 2022-11-16 PROCEDURE — 87804 INFLUENZA ASSAY W/OPTIC: CPT | Performed by: FAMILY MEDICINE

## 2022-11-16 PROCEDURE — G2023 SPECIMEN COLLECT COVID-19: HCPCS | Performed by: FAMILY MEDICINE

## 2022-11-16 PROCEDURE — 99214 OFFICE O/P EST MOD 30 MIN: CPT | Performed by: FAMILY MEDICINE

## 2022-11-16 RX ORDER — GABAPENTIN 300 MG/1
300 CAPSULE ORAL DAILY
COMMUNITY
Start: 2022-11-16

## 2022-11-16 RX ORDER — OSELTAMIVIR PHOSPHATE 75 MG/1
75 CAPSULE ORAL 2 TIMES DAILY
Qty: 10 CAPSULE | Refills: 0 | Status: SHIPPED | OUTPATIENT
Start: 2022-11-16 | End: 2022-11-21

## 2022-11-16 NOTE — PROGRESS NOTES
Assessment & Plan   Problem List Items Addressed This Visit    None  Visit Diagnoses     Other specified cough    -  Primary    Relevant Orders    COVID-19 ([facility]) (Completed)    Influenza A and B ([facility]) (Completed)    Influenza A        Relevant Medications    oseltamivir (TAMIFLU) 75 MG capsule         1. Other specified cough    - COVID-19 ([facility])  - Influenza A and B ([facility])    2. Influenza A  His symptoms are relatively mild. Treat with tamiflu, treat otherwise symptomatically. Should be seen urgently if changes or worsening symptoms.  - oseltamivir (TAMIFLU) 75 MG capsule; Take 1 capsule (75 mg) by mouth 2 times daily for 5 days  Dispense: 10 capsule; Refill: 0             FUTURE APPOINTMENTS:       - Follow-up visit as needed.    No follow-ups on file.    Olive Dickerson MD  [facility]    Subjective     Nursing Notes:   Chelsey Garcia CMA  11/16/2022 11:45 AM  Signed  Chief Complaint   Patient presents with     URI     Symptoms started 2 days ago with coughing and congestion, SOB and heaviness in chest, body aches, chills      Pre-visit Screening:  Immunizations:  up to date  Colonoscopy:  is up to date  Mammogram: NA  Asthma Action Test/Plan:  NA  PHQ9:  NA  GAD7:  NA  Questioned patient about current smoking habits Pt. quit smoking some time ago.  Ok to leave detailed message on voice mail for today's visit only Yes, phone # [phone]           Nilesh Dos Santos is a 77 year old male who presents to clinic today for the following health issues   HPI     Here with cough, has some shortness of breath with cough,  having trouble sleeping. Discomfort In left neck, possibly from coughing. Sx started 2 days ago. Has runny nose but also runny, can't clear the throat.         Review of Systems   Constitutional, HEENT, cardiovascular, pulmonary, gi and gu systems are negative, except as otherwise noted.      Objective    /82 (BP Location: Left arm, Patient Position: Sitting, Cuff Size: Adult  Large)   Pulse 73   Temp 97.9  F (36.6  C) (Temporal)   Ht 1.829 m (6')   Wt 86.6 kg (191 lb)   SpO2 97%   BMI 25.90 kg/m    Body mass index is 25.9 kg/m .  Physical Exam   GENERAL: healthy, alert and no distress  EYES: Eyes grossly normal to inspection, PERRL and conjunctivae and sclerae normal  HENT: ear canals and TM's normal, nose and mouth without ulcers or lesions  RESP: lungs clear to auscultation - no rales, rhonchi or wheezes  CV: regular rate and rhythm, normal S1 S2, no S3 or S4, no murmur, click or rub, no peripheral edema and peripheral pulses strong  ABDOMEN: soft, nontender, no hepatosplenomegaly, no masses and bowel sounds normal  MS: no gross musculoskeletal defects noted, no edema  NEURO: Normal strength and tone, mentation intact and speech normal  PSYCH: mentation appears normal, affect normal/bright    Results for orders placed or performed in visit on 11/16/22   COVID-19 ([facility])     Status: None   Result Value Ref Range    COVID-19 Negative    Influenza A and B ([facility])     Status: Abnormal   Result Value Ref Range    Influenza A POS (A) neg    Influenza B NEG neg

## 2022-11-16 NOTE — NURSING NOTE
Chief Complaint   Patient presents with     URI     Symptoms started 2 days ago with coughing and congestion, SOB and heaviness in chest, body aches, chills      Pre-visit Screening:  Immunizations:  up to date  Colonoscopy:  is up to date  Mammogram: NA  Asthma Action Test/Plan:  NA  PHQ9:  NA  GAD7:  NA  Questioned patient about current smoking habits Pt. quit smoking some time ago.  Ok to leave detailed message on voice mail for today's visit only Yes, phone # [phone]

## 2022-12-07 DIAGNOSIS — I10 ESSENTIAL HYPERTENSION, BENIGN: ICD-10-CM

## 2022-12-07 DIAGNOSIS — E78.2 MIXED HYPERLIPIDEMIA: ICD-10-CM

## 2022-12-07 RX ORDER — ROSUVASTATIN CALCIUM 40 MG/1
40 TABLET, COATED ORAL DAILY
Qty: 90 TABLET | Refills: 0 | Status: SHIPPED | OUTPATIENT
Start: 2022-12-07 | End: 2023-01-09

## 2022-12-07 RX ORDER — HYDROCHLOROTHIAZIDE 25 MG/1
25 TABLET ORAL DAILY
Qty: 90 TABLET | Refills: 0 | Status: SHIPPED | OUTPATIENT
Start: 2022-12-07 | End: 2023-01-09

## 2022-12-21 ENCOUNTER — TRANSFERRED RECORDS (OUTPATIENT)
Dept: FAMILY MEDICINE | Facility: CLINIC | Age: 77
End: 2022-12-21

## 2023-01-06 ENCOUNTER — LAB (OUTPATIENT)
Dept: LAB | Facility: CLINIC | Age: 78
End: 2023-01-06
Payer: COMMERCIAL

## 2023-01-06 ENCOUNTER — DOCUMENTATION ONLY (OUTPATIENT)
Dept: CARDIOLOGY | Facility: CLINIC | Age: 78
End: 2023-01-06

## 2023-01-06 DIAGNOSIS — I10 ESSENTIAL HYPERTENSION, BENIGN: ICD-10-CM

## 2023-01-06 DIAGNOSIS — E78.2 MIXED HYPERLIPIDEMIA: Primary | ICD-10-CM

## 2023-01-06 DIAGNOSIS — I25.10 CORONARY ARTERY DISEASE INVOLVING NATIVE CORONARY ARTERY OF NATIVE HEART WITHOUT ANGINA PECTORIS: ICD-10-CM

## 2023-01-06 DIAGNOSIS — E78.2 MIXED HYPERLIPIDEMIA: ICD-10-CM

## 2023-01-06 LAB
ALT SERPL W P-5'-P-CCNC: 16 U/L (ref 10–50)
ANION GAP SERPL CALCULATED.3IONS-SCNC: 11 MMOL/L (ref 7–15)
BUN SERPL-MCNC: 24.5 MG/DL (ref 8–23)
CALCIUM SERPL-MCNC: 9.8 MG/DL (ref 8.8–10.2)
CHLORIDE SERPL-SCNC: 100 MMOL/L (ref 98–107)
CHOLEST SERPL-MCNC: 137 MG/DL
CREAT SERPL-MCNC: 1.36 MG/DL (ref 0.67–1.17)
DEPRECATED HCO3 PLAS-SCNC: 31 MMOL/L (ref 22–29)
GFR SERPL CREATININE-BSD FRML MDRD: 54 ML/MIN/1.73M2
GLUCOSE SERPL-MCNC: 100 MG/DL (ref 70–99)
HDLC SERPL-MCNC: 54 MG/DL
LDLC SERPL CALC-MCNC: 66 MG/DL
NONHDLC SERPL-MCNC: 83 MG/DL
POTASSIUM SERPL-SCNC: 3.3 MMOL/L (ref 3.4–5.3)
SODIUM SERPL-SCNC: 142 MMOL/L (ref 136–145)
TRIGL SERPL-MCNC: 84 MG/DL

## 2023-01-06 PROCEDURE — 84460 ALANINE AMINO (ALT) (SGPT): CPT | Performed by: INTERNAL MEDICINE

## 2023-01-06 PROCEDURE — 80061 LIPID PANEL: CPT | Performed by: INTERNAL MEDICINE

## 2023-01-06 PROCEDURE — 80048 BASIC METABOLIC PNL TOTAL CA: CPT | Performed by: INTERNAL MEDICINE

## 2023-01-06 PROCEDURE — 36415 COLL VENOUS BLD VENIPUNCTURE: CPT | Performed by: INTERNAL MEDICINE

## 2023-01-09 ENCOUNTER — OFFICE VISIT (OUTPATIENT)
Dept: CARDIOLOGY | Facility: CLINIC | Age: 78
End: 2023-01-09
Payer: COMMERCIAL

## 2023-01-09 VITALS
DIASTOLIC BLOOD PRESSURE: 77 MMHG | WEIGHT: 185.1 LBS | SYSTOLIC BLOOD PRESSURE: 137 MMHG | HEIGHT: 72 IN | OXYGEN SATURATION: 98 % | HEART RATE: 68 BPM | BODY MASS INDEX: 25.07 KG/M2

## 2023-01-09 DIAGNOSIS — I25.10 CORONARY ARTERY DISEASE INVOLVING NATIVE CORONARY ARTERY OF NATIVE HEART WITHOUT ANGINA PECTORIS: Primary | ICD-10-CM

## 2023-01-09 DIAGNOSIS — I10 ESSENTIAL HYPERTENSION, BENIGN: ICD-10-CM

## 2023-01-09 DIAGNOSIS — E78.2 MIXED HYPERLIPIDEMIA: ICD-10-CM

## 2023-01-09 PROCEDURE — 99214 OFFICE O/P EST MOD 30 MIN: CPT | Performed by: INTERNAL MEDICINE

## 2023-01-09 RX ORDER — MONTELUKAST SODIUM 10 MG/1
10 TABLET ORAL AT BEDTIME
COMMUNITY

## 2023-01-09 RX ORDER — OMEGA-3 FATTY ACIDS/FISH OIL 300-1000MG
200 CAPSULE ORAL 2 TIMES DAILY
COMMUNITY

## 2023-01-09 RX ORDER — POTASSIUM CHLORIDE 1500 MG/1
20 TABLET, EXTENDED RELEASE ORAL DAILY
Qty: 90 TABLET | Refills: 3 | Status: SHIPPED | OUTPATIENT
Start: 2023-01-09 | End: 2023-11-15

## 2023-01-09 RX ORDER — ROSUVASTATIN CALCIUM 40 MG/1
40 TABLET, COATED ORAL DAILY
Qty: 90 TABLET | Refills: 3 | Status: SHIPPED | OUTPATIENT
Start: 2023-01-09 | End: 2023-02-03

## 2023-01-09 RX ORDER — HYDROCHLOROTHIAZIDE 25 MG/1
25 TABLET ORAL DAILY
Qty: 90 TABLET | Refills: 3 | Status: SHIPPED | OUTPATIENT
Start: 2023-01-09 | End: 2023-02-03

## 2023-01-09 NOTE — LETTER
Date:January 10, 2023      Provider requested that no letter be sent. Do not send.       [facility]

## 2023-01-09 NOTE — LETTER
1/9/2023    Physician No Ref-Primary  No address on file    RE: Nilesh Dos Santos       Dear Colleague,     I had the pleasure of seeing Nilesh Dos Santos in the [facility].  HPI and Plan:   Mr. Dos Santos is a very pleasant 77-year-old gentleman with history of chronic stable angina in the past, with coronary CT angiogram in 2013 showing diffuse coronary artery calcification and then subsequent coronary angiogram in 2015 when he was found to have moderate stenosis involving the RPDA and posterolateral branch with FFR of 0.9 and 0.94, respectively, indicating they were not flow-limiting lesions.  He also has history of hypertension and is on hydrochlorothiazide.  Today is coming for routine follow-up.    Echocardiogram July 2021 showed normal LV function without any significant valvular abnormalities.  Exercise stress echocardiogram around the same time showed no evidence of inducible ischemia.  Today patient is coming for routine follow-up.  Recent labs shows well-controlled LDL at 66 normal ALT.  BMP shows a creatinine of 1.36 which he appears close to his baseline with the hypokalemia with potassium of 3.3.  To be noted patient has longstanding history of hypokalemia and was supposed to be on 20 mg daily of oral potassium supplementation but for some reason he has not been on it or taking it.  He is on hydrochlorothiazide, baby aspirin, Crestor 40 mg daily.  He denies any exertional symptoms like chest discomfort or shortness of breath dizziness presyncope or syncope.    Assessment plan  1.  CAD.  Clinically no anginal symptoms.  On aspirin, high intensity statin.  LDL well controlled.  2.  Hypertension on hydrochlorothiazide.  Blood pressure fairly controlled  3.  LDL 66 on high intensity statin  4.  Hypokalemia.  Likely due to hydrochlorothiazide.    Recommendations  Continue aspirin, Crestor, hydrochlorothiazide  Add potassium supplement KCl 20 mg daily.  Recheck BMP in 1 week time.  Follow-up in 1 year,  sooner if he notes any change in clinical status especially exertional related symptoms.    Today's clinic visit entailed:  Review of the result(s) of each unique test - bmp, lipid panel        Orders Placed This Encounter   Procedures     Basic metabolic panel     Lipid Profile     ALT     Basic metabolic panel     Follow-Up with Cardiology       Orders Placed This Encounter   Medications     Cranberry 1000 MG CAPS     Sig: Take 650 mg by mouth daily     montelukast (SINGULAIR) 10 MG tablet     Sig: Take 10 mg by mouth At Bedtime     ibuprofen (ADVIL/MOTRIN) 200 MG capsule     Sig: Take 200 mg by mouth every 4 hours as needed     hydrochlorothiazide (HYDRODIURIL) 25 MG tablet     Sig: Take 1 tablet (25 mg) by mouth daily     Dispense:  90 tablet     Refill:  3     rosuvastatin (CRESTOR) 40 MG tablet     Sig: Take 1 tablet (40 mg) by mouth daily     Dispense:  90 tablet     Refill:  3     potassium chloride ER (K-TAB) 20 MEQ CR tablet     Sig: Take 1 tablet (20 mEq) by mouth daily     Dispense:  90 tablet     Refill:  3       Medications Discontinued During This Encounter   Medication Reason     hydrochlorothiazide (HYDRODIURIL) 25 MG tablet Reorder     rosuvastatin (CRESTOR) 40 MG tablet Reorder         Encounter Diagnoses   Name Primary?     Essential hypertension, benign      Mixed hyperlipidemia      Coronary artery disease involving native coronary artery of native heart without angina pectoris Yes       CURRENT MEDICATIONS:  Current Outpatient Medications   Medication Sig Dispense Refill     aspirin 81 MG tablet Take by mouth daily       Cholecalciferol (VITAMIN D3) 25 MCG (1000 UT) CAPS        coenzyme Q-10 200 MG CAPS Take 100 mg by mouth daily       Cranberry 1000 MG CAPS Take 650 mg by mouth daily       DiphenhydrAMINE HCl, Sleep, 50 MG TABS Take 50 mg by mouth nightly as needed 14 tablet      fluticasone (FLONASE) 50 MCG/ACT spray Spray 1-2 sprays into both nostrils daily 3 Bottle 3     gabapentin  (NEURONTIN) 300 MG capsule Take 1 capsule (300 mg) by mouth daily       hydrochlorothiazide (HYDRODIURIL) 25 MG tablet Take 1 tablet (25 mg) by mouth daily 90 tablet 3     ibuprofen (ADVIL/MOTRIN) 200 MG capsule Take 200 mg by mouth every 4 hours as needed       montelukast (SINGULAIR) 10 MG tablet Take 10 mg by mouth At Bedtime       naproxen sodium (ANAPROX) 220 MG tablet Take 220 mg by mouth as needed for moderate pain       nitroGLYcerin (NITROSTAT) 0.4 MG sublingual tablet Place under the tongue every 5 minutes as needed       potassium chloride ER (K-TAB) 20 MEQ CR tablet Take 1 tablet (20 mEq) by mouth daily 90 tablet 3     rosuvastatin (CRESTOR) 40 MG tablet Take 1 tablet (40 mg) by mouth daily 90 tablet 3     Turmeric 500 MG CAPS Take 3 capsules (1,500 mg) by mouth daily       Zinc Sulfate (ZINC 15 PO) Take 50 mg by mouth daily Dose unknown         ALLERGIES     Allergies   Allergen Reactions     Amoxicillin      severe rash     Contrast Dye Hives     Patient states this was years ago and he believes he has had dye since that time without problems.     Imdur [Isosorbide] Other (See Comments)     headache     Lopressor [Metoprolol] Fatigue       PAST MEDICAL HISTORY:  Past Medical History:   Diagnosis Date     CAD (coronary artery disease)     cardiac cath 2015: non-obstructive diffuse CAD      Chest pain     chronic stable     Chronic stable angina (H)      Contact dermatitis and other eczema, due to unspecified cause      DEPRESSIVE DISORDER NEC      Dyslipidemia      Fam hx-cardiovas dis NEC     Mother and Father     Hernia, abdominal      HTN (hypertension)      IRRITABLE COLON      Mild aortic stenosis      Mumps      Personal history of urinary calculi      Polyneuropathy      Skin cancer, basal cell 2008     SOB (shortness of breath)      Varicose veins        PAST SURGICAL HISTORY:  Past Surgical History:   Procedure Laterality Date     ANGIOGRAM  11/17/2015    Med Tx, no flow limiting lesions      "COLONOSCOPY Left 2018    Procedure: COLONOSCOPY;  colonoscopy (fv)  ;  Surgeon: Nilesh Cervantes MD;  Location:  GI     CYSTOSCOPY       EYE EXAM ESTABLISHED PT       HC EXCISE VARICOCELE      \"cautery\" through intra venous approach     HC KNEE SCOPE, DIAGNOSTIC      Arthroscopy, Knee/left knee      HC REPAIR ING HERNIA,5+Y/O,REDUCIBL      Inguinal Hernia Repair  Right     TEST NOT FOUND      Per cut removal of L kidney stone     TESTICLE SURGERY  age 35    Varicocoele repair     VASECTOMY  age 30     ZZC URETER ENDOSCOPY THRU URETEROSTOMY REMV FB OR CALCULUS      dr de la garza     ZZHC COLONOSCOPY THRU STOMA, DIAGNOSTIC         FAMILY HISTORY:  Family History   Problem Relation Age of Onset     Heart Disease Mother         91     Cancer - colorectal Mother      Heart Disease Father          70s     Diabetes Maternal Aunt      Alzheimer Disease No family hx of      Cancer No family hx of      Prostate Cancer No family hx of        SOCIAL HISTORY:  Social History     Socioeconomic History     Marital status:      Spouse name: Velia     Number of children: 2     Years of education: 18     Highest education level: None   Occupational History     Occupation: Consultant     Employer: SELF   Tobacco Use     Smoking status: Former     Types: Cigars     Quit date: 1975     Years since quittin.0     Smokeless tobacco: Never     Tobacco comments:     2-3 cigars   Substance and Sexual Activity     Alcohol use: Yes     Alcohol/week: 3.3 standard drinks     Types: 4 Glasses of wine per week     Comment: 1-2 glass of wine daily     Drug use: No     Sexual activity: Yes     Partners: Female     Birth control/protection: Surgical     Comment: vas   Other Topics Concern      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, , [employer]      Blood Transfusions No     Caffeine Concern Yes     Comment: 2 cups per day     Occupational Exposure No     Hobby Hazards No     Sleep " Concern No     Stress Concern No     Weight Concern No     Special Diet No     Exercise No     Seat Belt Yes     Self-Exams No       Review of Systems:  Skin:          Eyes:         ENT:         Respiratory:          Cardiovascular:         Gastroenterology:        Genitourinary:         Musculoskeletal:         Neurologic:         Psychiatric:         Heme/Lymph/Imm:         Endocrine:           Physical Exam:  Vitals: /77 (BP Location: Left arm, Patient Position: Sitting)   Pulse 68   Ht 1.829 m (6')   Wt 84 kg (185 lb 1.6 oz)   SpO2 98%   BMI 25.10 kg/m      General patient appears comfortable  Neck normal JVP  Cardiovascular system S1-S2 normal no murmur rub or gallop  Respiratory system clear to auscultation  Extremities no edema  Neurological alert, oriented    CC  Bharat Mata MD  [address]  [place]  [place] [id number]                  Thank you for allowing me to participate in the care of your patient.      Sincerely,     Bharat Mata MD     [facility]  cc:   Bharat Mata MD  [address]  [place] [id number]

## 2023-01-09 NOTE — PROGRESS NOTES
HPI and Plan:   Mr. Dos Santos is a very pleasant 77-year-old gentleman with history of chronic stable angina in the past, with coronary CT angiogram in 2013 showing diffuse coronary artery calcification and then subsequent coronary angiogram in 2015 when he was found to have moderate stenosis involving the RPDA and posterolateral branch with FFR of 0.9 and 0.94, respectively, indicating they were not flow-limiting lesions.  He also has history of hypertension and is on hydrochlorothiazide.  Today is coming for routine follow-up.    Echocardiogram July 2021 showed normal LV function without any significant valvular abnormalities.  Exercise stress echocardiogram around the same time showed no evidence of inducible ischemia.  Today patient is coming for routine follow-up.  Recent labs shows well-controlled LDL at 66 normal ALT.  BMP shows a creatinine of 1.36 which he appears close to his baseline with the hypokalemia with potassium of 3.3.  To be noted patient has longstanding history of hypokalemia and was supposed to be on 20 mg daily of oral potassium supplementation but for some reason he has not been on it or taking it.  He is on hydrochlorothiazide, baby aspirin, Crestor 40 mg daily.  He denies any exertional symptoms like chest discomfort or shortness of breath dizziness presyncope or syncope.    Assessment plan  1.  CAD.  Clinically no anginal symptoms.  On aspirin, high intensity statin.  LDL well controlled.  2.  Hypertension on hydrochlorothiazide.  Blood pressure fairly controlled  3.  LDL 66 on high intensity statin  4.  Hypokalemia.  Likely due to hydrochlorothiazide.    Recommendations  Continue aspirin, Crestor, hydrochlorothiazide  Add potassium supplement KCl 20 mg daily.  Recheck BMP in 1 week time.  Follow-up in 1 year, sooner if he notes any change in clinical status especially exertional related symptoms.    Today's clinic visit entailed:  Review of the result(s) of each unique test - bmp, lipid  panel        Orders Placed This Encounter   Procedures     Basic metabolic panel     Lipid Profile     ALT     Basic metabolic panel     Follow-Up with Cardiology       Orders Placed This Encounter   Medications     Cranberry 1000 MG CAPS     Sig: Take 650 mg by mouth daily     montelukast (SINGULAIR) 10 MG tablet     Sig: Take 10 mg by mouth At Bedtime     ibuprofen (ADVIL/MOTRIN) 200 MG capsule     Sig: Take 200 mg by mouth every 4 hours as needed     hydrochlorothiazide (HYDRODIURIL) 25 MG tablet     Sig: Take 1 tablet (25 mg) by mouth daily     Dispense:  90 tablet     Refill:  3     rosuvastatin (CRESTOR) 40 MG tablet     Sig: Take 1 tablet (40 mg) by mouth daily     Dispense:  90 tablet     Refill:  3     potassium chloride ER (K-TAB) 20 MEQ CR tablet     Sig: Take 1 tablet (20 mEq) by mouth daily     Dispense:  90 tablet     Refill:  3       Medications Discontinued During This Encounter   Medication Reason     hydrochlorothiazide (HYDRODIURIL) 25 MG tablet Reorder     rosuvastatin (CRESTOR) 40 MG tablet Reorder         Encounter Diagnoses   Name Primary?     Essential hypertension, benign      Mixed hyperlipidemia      Coronary artery disease involving native coronary artery of native heart without angina pectoris Yes       CURRENT MEDICATIONS:  Current Outpatient Medications   Medication Sig Dispense Refill     aspirin 81 MG tablet Take by mouth daily       Cholecalciferol (VITAMIN D3) 25 MCG (1000 UT) CAPS        coenzyme Q-10 200 MG CAPS Take 100 mg by mouth daily       Cranberry 1000 MG CAPS Take 650 mg by mouth daily       DiphenhydrAMINE HCl, Sleep, 50 MG TABS Take 50 mg by mouth nightly as needed 14 tablet      fluticasone (FLONASE) 50 MCG/ACT spray Spray 1-2 sprays into both nostrils daily 3 Bottle 3     gabapentin (NEURONTIN) 300 MG capsule Take 1 capsule (300 mg) by mouth daily       hydrochlorothiazide (HYDRODIURIL) 25 MG tablet Take 1 tablet (25 mg) by mouth daily 90 tablet 3     ibuprofen  (ADVIL/MOTRIN) 200 MG capsule Take 200 mg by mouth every 4 hours as needed       montelukast (SINGULAIR) 10 MG tablet Take 10 mg by mouth At Bedtime       naproxen sodium (ANAPROX) 220 MG tablet Take 220 mg by mouth as needed for moderate pain       nitroGLYcerin (NITROSTAT) 0.4 MG sublingual tablet Place under the tongue every 5 minutes as needed       potassium chloride ER (K-TAB) 20 MEQ CR tablet Take 1 tablet (20 mEq) by mouth daily 90 tablet 3     rosuvastatin (CRESTOR) 40 MG tablet Take 1 tablet (40 mg) by mouth daily 90 tablet 3     Turmeric 500 MG CAPS Take 3 capsules (1,500 mg) by mouth daily       Zinc Sulfate (ZINC 15 PO) Take 50 mg by mouth daily Dose unknown         ALLERGIES     Allergies   Allergen Reactions     Amoxicillin      severe rash     Contrast Dye Hives     Patient states this was years ago and he believes he has had dye since that time without problems.     Imdur [Isosorbide] Other (See Comments)     headache     Lopressor [Metoprolol] Fatigue       PAST MEDICAL HISTORY:  Past Medical History:   Diagnosis Date     CAD (coronary artery disease)     cardiac cath 2015: non-obstructive diffuse CAD      Chest pain     chronic stable     Chronic stable angina (H)      Contact dermatitis and other eczema, due to unspecified cause      DEPRESSIVE DISORDER NEC      Dyslipidemia      Fam hx-cardiovas dis NEC     Mother and Father     Hernia, abdominal      HTN (hypertension)      IRRITABLE COLON      Mild aortic stenosis      Mumps      Personal history of urinary calculi      Polyneuropathy      Skin cancer, basal cell 2008     SOB (shortness of breath)      Varicose veins        PAST SURGICAL HISTORY:  Past Surgical History:   Procedure Laterality Date     ANGIOGRAM  11/17/2015    Med Tx, no flow limiting lesions     COLONOSCOPY Left 5/29/2018    Procedure: COLONOSCOPY;  colonoscopy (fv)  ;  Surgeon: Nilesh Cervantes MD;  Location:  GI     CYSTOSCOPY       EYE EXAM ESTABLISHED PT  2009     HC  "EXCISE VARICOCELE      \"cautery\" through intra venous approach     HC KNEE SCOPE, DIAGNOSTIC      Arthroscopy, Knee/left knee      HC REPAIR ING HERNIA,5+Y/O,REDUCIBL      Inguinal Hernia Repair  Right     TEST NOT FOUND      Per cut removal of L kidney stone     TESTICLE SURGERY  age 35    Varicocoele repair     VASECTOMY  age 30     ZZC URETER ENDOSCOPY THRU URETEROSTOMY REMV FB OR CALCULUS      dr de la garza     ZZHC COLONOSCOPY THRU STOMA, DIAGNOSTIC         FAMILY HISTORY:  Family History   Problem Relation Age of Onset     Heart Disease Mother         91     Cancer - colorectal Mother      Heart Disease Father          70s     Diabetes Maternal Aunt      Alzheimer Disease No family hx of      Cancer No family hx of      Prostate Cancer No family hx of        SOCIAL HISTORY:  Social History     Socioeconomic History     Marital status:      Spouse name: Velia     Number of children: 2     Years of education: 18     Highest education level: None   Occupational History     Occupation: Consultant     Employer: SELF   Tobacco Use     Smoking status: Former     Types: Cigars     Quit date: 1975     Years since quittin.0     Smokeless tobacco: Never     Tobacco comments:     2-3 cigars   Substance and Sexual Activity     Alcohol use: Yes     Alcohol/week: 3.3 standard drinks     Types: 4 Glasses of wine per week     Comment: 1-2 glass of wine daily     Drug use: No     Sexual activity: Yes     Partners: Female     Birth control/protection: Surgical     Comment: vas   Other Topics Concern      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, , [employer]      Blood Transfusions No     Caffeine Concern Yes     Comment: 2 cups per day     Occupational Exposure No     Hobby Hazards No     Sleep Concern No     Stress Concern No     Weight Concern No     Special Diet No     Exercise No     Seat Belt Yes     Self-Exams No       Review of Systems:  Skin:          Eyes:         ENT:   "       Respiratory:          Cardiovascular:         Gastroenterology:        Genitourinary:         Musculoskeletal:         Neurologic:         Psychiatric:         Heme/Lymph/Imm:         Endocrine:           Physical Exam:  Vitals: /77 (BP Location: Left arm, Patient Position: Sitting)   Pulse 68   Ht 1.829 m (6')   Wt 84 kg (185 lb 1.6 oz)   SpO2 98%   BMI 25.10 kg/m      General patient appears comfortable  Neck normal JVP  Cardiovascular system S1-S2 normal no murmur rub or gallop  Respiratory system clear to auscultation  Extremities no edema  Neurological alert, oriented    CC  Bharat Mata MD  [address]  [place] [id number]

## 2023-02-01 ENCOUNTER — TRANSFERRED RECORDS (OUTPATIENT)
Dept: FAMILY MEDICINE | Facility: CLINIC | Age: 78
End: 2023-02-01

## 2023-02-02 ENCOUNTER — LAB (OUTPATIENT)
Dept: LAB | Facility: CLINIC | Age: 78
End: 2023-02-02
Payer: COMMERCIAL

## 2023-02-02 ENCOUNTER — TELEPHONE (OUTPATIENT)
Dept: CARDIOLOGY | Facility: CLINIC | Age: 78
End: 2023-02-02

## 2023-02-02 DIAGNOSIS — I25.10 CORONARY ARTERY DISEASE INVOLVING NATIVE CORONARY ARTERY OF NATIVE HEART WITHOUT ANGINA PECTORIS: ICD-10-CM

## 2023-02-02 DIAGNOSIS — E78.2 MIXED HYPERLIPIDEMIA: ICD-10-CM

## 2023-02-02 DIAGNOSIS — I10 ESSENTIAL HYPERTENSION, BENIGN: Primary | ICD-10-CM

## 2023-02-02 LAB
ANION GAP SERPL CALCULATED.3IONS-SCNC: <1 MMOL/L (ref 7–15)
BUN SERPL-MCNC: 14.5 MG/DL (ref 8–23)
CALCIUM SERPL-MCNC: 9.3 MG/DL (ref 8.8–10.2)
CHLORIDE SERPL-SCNC: 101 MMOL/L (ref 98–107)
CREAT SERPL-MCNC: 1.33 MG/DL (ref 0.67–1.17)
DEPRECATED HCO3 PLAS-SCNC: 30 MMOL/L (ref 22–29)
GFR SERPL CREATININE-BSD FRML MDRD: 55 ML/MIN/1.73M2
GLUCOSE SERPL-MCNC: 115 MG/DL (ref 70–99)
POTASSIUM SERPL-SCNC: 3.8 MMOL/L (ref 3.4–5.3)
SODIUM SERPL-SCNC: 127 MMOL/L (ref 136–145)

## 2023-02-02 PROCEDURE — 80048 BASIC METABOLIC PNL TOTAL CA: CPT | Performed by: INTERNAL MEDICINE

## 2023-02-02 PROCEDURE — 36415 COLL VENOUS BLD VENIPUNCTURE: CPT | Performed by: INTERNAL MEDICINE

## 2023-02-02 NOTE — TELEPHONE ENCOUNTER
Team has received lab results from today.  BMP, which was ordered by Dr. Mata at the OV of 1\9\23.    Results are similar to past, except the sodium level has decreased to 127.   On 1\6\23 it was 142.  Looking back into 2020, it has never been low.    Writer has called to the patient, to see how he is felling.  He said he is doing well, feels great, no symptoms of anything.  He is wondering if he should have it retested maybe next week, to see if the results are similar.      Routed to Dr. Mata for review.    Sobia Crawford RN on 2/2/2023 at 3:42 PM

## 2023-02-03 DIAGNOSIS — E78.2 MIXED HYPERLIPIDEMIA: ICD-10-CM

## 2023-02-03 DIAGNOSIS — I10 ESSENTIAL HYPERTENSION, BENIGN: ICD-10-CM

## 2023-02-03 RX ORDER — ROSUVASTATIN CALCIUM 40 MG/1
40 TABLET, COATED ORAL DAILY
Qty: 90 TABLET | Refills: 3 | Status: SHIPPED | OUTPATIENT
Start: 2023-02-03 | End: 2023-11-15

## 2023-02-03 RX ORDER — HYDROCHLOROTHIAZIDE 25 MG/1
25 TABLET ORAL DAILY
Qty: 90 TABLET | Refills: 3 | Status: SHIPPED | OUTPATIENT
Start: 2023-02-03 | End: 2023-11-15

## 2023-02-06 NOTE — TELEPHONE ENCOUNTER
Hyponatremia could be from hydrochlorothiazide, although he has been on this medication for a long time and sodium was normal just a month ago.  Agree with rechecking this week again.    Recommendations  BMP this week  If he still has hyponatremia on recheck this week I recommend follow-up with his primary care physician for further evaluation.    Thank you  Bharat

## 2023-02-06 NOTE — TELEPHONE ENCOUNTER
2\6\23  Received response from Dr. Mata about the hyponatremia.  Dr. Mata in agreement that Willie should get another BMP checked sometime this week.       Entered a new order and called Willie.  Informed him he can have labs drawn at any [facility] clinic or the home clinic of his choice.  If not [facility], then we need to know where to FAX the order.    I left the nurse line phone number and requested he call to let us know if he needs us to FAX orders.    Sobia Crawford RN on 2/6/2023 at 12:22 PM

## 2023-02-07 ENCOUNTER — TELEPHONE (OUTPATIENT)
Dept: CARDIOLOGY | Facility: CLINIC | Age: 78
End: 2023-02-07
Payer: COMMERCIAL

## 2023-02-07 NOTE — TELEPHONE ENCOUNTER
Writer spoke with patient's wife today, because Willie's phone went right to voicemail.  We had not heard back from Willie regarding his lab results from last week.      Mrs. Dos Santos went and found Willie in the house, and I spoke with him.  He would like to get his labs rechecked at the [place] location of the heart clinic.   He will get appointment and have that done in the next day or two.    Sobia Crawford RN on 2/7/2023 at 8:42 AM

## 2023-02-10 ENCOUNTER — TELEPHONE (OUTPATIENT)
Dept: CARDIOLOGY | Facility: CLINIC | Age: 78
End: 2023-02-10

## 2023-02-10 ENCOUNTER — LAB (OUTPATIENT)
Dept: LAB | Facility: CLINIC | Age: 78
End: 2023-02-10
Payer: COMMERCIAL

## 2023-02-10 DIAGNOSIS — I10 ESSENTIAL HYPERTENSION, BENIGN: ICD-10-CM

## 2023-02-10 DIAGNOSIS — I25.10 CORONARY ARTERY DISEASE INVOLVING NATIVE CORONARY ARTERY OF NATIVE HEART WITHOUT ANGINA PECTORIS: ICD-10-CM

## 2023-02-10 DIAGNOSIS — E78.2 MIXED HYPERLIPIDEMIA: ICD-10-CM

## 2023-02-10 LAB
ANION GAP SERPL CALCULATED.3IONS-SCNC: 12 MMOL/L (ref 7–15)
BUN SERPL-MCNC: 15.7 MG/DL (ref 8–23)
CALCIUM SERPL-MCNC: 9.3 MG/DL (ref 8.8–10.2)
CHLORIDE SERPL-SCNC: 100 MMOL/L (ref 98–107)
CREAT SERPL-MCNC: 1.3 MG/DL (ref 0.67–1.17)
DEPRECATED HCO3 PLAS-SCNC: 27 MMOL/L (ref 22–29)
GFR SERPL CREATININE-BSD FRML MDRD: 57 ML/MIN/1.73M2
GLUCOSE SERPL-MCNC: 91 MG/DL (ref 70–99)
POTASSIUM SERPL-SCNC: 4 MMOL/L (ref 3.4–5.3)
SODIUM SERPL-SCNC: 139 MMOL/L (ref 136–145)

## 2023-02-10 PROCEDURE — 80048 BASIC METABOLIC PNL TOTAL CA: CPT | Performed by: INTERNAL MEDICINE

## 2023-02-10 PROCEDURE — 36415 COLL VENOUS BLD VENIPUNCTURE: CPT | Performed by: INTERNAL MEDICINE

## 2023-02-10 NOTE — TELEPHONE ENCOUNTER
Received lab results from today's follow-up BMP. Improved since last week.    Last week there was a bit of low sodium level, so Dr. Mata and patient did want it rechecked.    Writer called and spoke with Willie to let him know all is well.  He appreciated the call.    Messaged to Dr. Mata as well.    Sobia Crawford RN on 2/10/2023 at 4:34 PM

## 2023-02-12 ENCOUNTER — HEALTH MAINTENANCE LETTER (OUTPATIENT)
Age: 78
End: 2023-02-12

## 2023-02-20 ENCOUNTER — OFFICE VISIT (OUTPATIENT)
Dept: FAMILY MEDICINE | Facility: CLINIC | Age: 78
End: 2023-02-20

## 2023-02-20 VITALS
RESPIRATION RATE: 20 BRPM | HEIGHT: 72 IN | DIASTOLIC BLOOD PRESSURE: 72 MMHG | BODY MASS INDEX: 25.3 KG/M2 | TEMPERATURE: 97 F | SYSTOLIC BLOOD PRESSURE: 130 MMHG | HEART RATE: 76 BPM | WEIGHT: 186.8 LBS

## 2023-02-20 DIAGNOSIS — M79.10 GENERALIZED MUSCLE ACHE: ICD-10-CM

## 2023-02-20 DIAGNOSIS — R35.0 URINARY FREQUENCY: ICD-10-CM

## 2023-02-20 DIAGNOSIS — J06.9 VIRAL UPPER RESPIRATORY TRACT INFECTION: Primary | ICD-10-CM

## 2023-02-20 DIAGNOSIS — I25.10 CORONARY ARTERY DISEASE INVOLVING NATIVE CORONARY ARTERY OF NATIVE HEART WITHOUT ANGINA PECTORIS: ICD-10-CM

## 2023-02-20 DIAGNOSIS — R53.83 FATIGUE, UNSPECIFIED TYPE: ICD-10-CM

## 2023-02-20 DIAGNOSIS — R20.0 NUMBNESS OF TOES: ICD-10-CM

## 2023-02-20 LAB
APPEARANCE UR: ABNORMAL
BACTERIA, UR: ABNORMAL
BILIRUB UR QL: ABNORMAL
CASTS/LPF: ABNORMAL
COLOR UR: YELLOW
EP/HPF: ABNORMAL
GLUCOSE URINE: ABNORMAL MG/DL
HGB UR QL: ABNORMAL
KETONES UR QL: ABNORMAL MG/DL
MISC.: ABNORMAL
NITRITE UR QL STRIP: ABNORMAL
PH UR STRIP: 7 PH (ref 5–7)
PROT UR QL: 100 MG/DL
RBC, UR MICRO: ABNORMAL (ref ?–2)
SP GR UR STRIP: ABNORMAL (ref 1–1.03)
UROBILINOGEN UR QL STRIP: 2 EU/DL (ref 0.2–1)
WBC #/AREA URNS HPF: ABNORMAL /[HPF]
WBC, UR MICRO: ABNORMAL (ref ?–2)

## 2023-02-20 PROCEDURE — 81001 URINALYSIS AUTO W/SCOPE: CPT | Performed by: FAMILY MEDICINE

## 2023-02-20 PROCEDURE — 36415 COLL VENOUS BLD VENIPUNCTURE: CPT | Performed by: FAMILY MEDICINE

## 2023-02-20 PROCEDURE — 99214 OFFICE O/P EST MOD 30 MIN: CPT | Performed by: FAMILY MEDICINE

## 2023-02-20 RX ORDER — NITROGLYCERIN 0.4 MG/1
0.4 TABLET SUBLINGUAL EVERY 5 MIN PRN
Qty: 20 TABLET | Refills: 0 | Status: SHIPPED | OUTPATIENT
Start: 2023-02-20

## 2023-02-20 RX ORDER — TRAZODONE HYDROCHLORIDE 50 MG/1
50 TABLET, FILM COATED ORAL AT BEDTIME
Qty: 30 TABLET | Refills: 0 | Status: SHIPPED | OUTPATIENT
Start: 2023-02-20

## 2023-02-20 NOTE — PROGRESS NOTES
SUBJECTIVE:   Nilesh Dos Santos is a 78 year old male who complains of nasal congestion, clear nasal discharge, headache, mild sore throat, cough, chest congestion, myalgias and chills for 6 days. He denies a history of productive cough, dizzyness and chest pain and denies a history of asthma. Patient does not smoke cigarettes.    Pt did not do any home covid tests.  Pt did have flu 11/22    He has been taking Mucinex and cough drops.     Pt feels symptoms improving    Pt also mentions numbness in toes for years-makes it hard to sleep, seems ot be worsening some- pt has never seen neurology but has seen vascular and had normal W/U-he is on gabapentin    Pt further notes nocturia 4-5 times per night    Pt feels fatigues much of the time, denies depression, has never been diagnosed with obstructive sleep apnea     Patient Active Problem List   Diagnosis     Calculus of kidney     Allergic rhinitis     Mixed hyperlipidemia     History of colonic polyps     Essential hypertension, benign     Health Care Home     ACP (advance care planning)     DJD (degenerative joint disease) of knee     Abnormal glucose     Varicose veins of both lower extremities with complications     Coronary artery disease involving native coronary artery of native heart without angina pectoris     Left inguinal hernia     Ascending aorta dilatation (H)     Mild aortic stenosis     CKD (chronic kidney disease) stage 3, GFR 30-59 ml/min (H)     Past Medical History:   Diagnosis Date     CAD (coronary artery disease)     cardiac cath 2015: non-obstructive diffuse CAD      Chest pain     chronic stable     Chronic stable angina (H)      Contact dermatitis and other eczema, due to unspecified cause      DEPRESSIVE DISORDER NEC      Dyslipidemia      Fam hx-cardiovas dis NEC     Mother and Father     Hernia, abdominal      HTN (hypertension)      IRRITABLE COLON      Mild aortic stenosis      Mumps      Personal history of urinary calculi       Polyneuropathy      Skin cancer, basal cell 2008     SOB (shortness of breath)      Varicose veins      Family History   Problem Relation Age of Onset     Heart Disease Mother         91     Cancer - colorectal Mother      Heart Disease Father          70s     Diabetes Maternal Aunt      Alzheimer Disease No family hx of      Cancer No family hx of      Prostate Cancer No family hx of      Social History     Socioeconomic History     Marital status:      Spouse name: Velia     Number of children: 2     Years of education: 18     Highest education level: Not on file   Occupational History     Occupation: Consultant     Employer: [employer]   Tobacco Use     Smoking status: Former     Types: Cigars     Quit date: 1975     Years since quittin.1     Smokeless tobacco: Never     Tobacco comments:     2-3 cigars   Substance and Sexual Activity     Alcohol use: Yes     Alcohol/week: 3.3 standard drinks     Types: 4 Glasses of wine per week     Comment: 1-2 glass of wine daily     Drug use: No     Sexual activity: Yes     Partners: Female     Birth control/protection: Surgical     Comment: vas   Other Topics Concern      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 Not Asked   Social History Narrative     Not on file     Social Determinants of Health     Financial Resource Strain: Not on file   Food Insecurity: Not on file   Transportation Needs: Not on file   Physical Activity: Not on file   Stress: Not on file   Social Connections: Not on file   Intimate Partner Violence: Not on file   Housing Stability: Not on file  "    Past Surgical History:   Procedure Laterality Date     ANGIOGRAM  11/17/2015    Med Tx, no flow limiting lesions     COLONOSCOPY Left 5/29/2018    Procedure: COLONOSCOPY;  colonoscopy (fv)  ;  Surgeon: Nilesh Cervantes MD;  Location:  GI     CYSTOSCOPY       EYE EXAM ESTABLISHED PT  2009     HC EXCISE VARICOCELE      \"cautery\" through intra venous approach     HC KNEE SCOPE, DIAGNOSTIC  2004    Arthroscopy, Knee/left knee      HC REPAIR ING HERNIA,5+Y/O,REDUCIBL  1990's    Inguinal Hernia Repair  Right     TEST NOT FOUND  2002    Per cut removal of L kidney stone     TESTICLE SURGERY  age 35    Varicocoele repair     VASECTOMY  age 30     ZZ URETER ENDOSCOPY THRU URETEROSTOMY REMV FB OR CALCULUS  2001    dr de la garza     [facility] COLONOSCOPY THRU STOMA, DIAGNOSTIC  2005     aspirin 81 MG tablet, Take by mouth daily  Cholecalciferol (VITAMIN D3) 25 MCG (1000 UT) CAPS,   coenzyme Q-10 200 MG CAPS, Take 100 mg by mouth daily  Cranberry 1000 MG CAPS, Take 650 mg by mouth daily  DiphenhydrAMINE HCl, Sleep, 50 MG TABS, Take 50 mg by mouth nightly as needed  fluticasone (FLONASE) 50 MCG/ACT spray, Spray 1-2 sprays into both nostrils daily  gabapentin (NEURONTIN) 300 MG capsule, Take 1 capsule (300 mg) by mouth daily  hydrochlorothiazide (HYDRODIURIL) 25 MG tablet, Take 1 tablet (25 mg) by mouth daily  ibuprofen (ADVIL/MOTRIN) 200 MG capsule, Take 200 mg by mouth every 4 hours as needed  montelukast (SINGULAIR) 10 MG tablet, Take 10 mg by mouth At Bedtime  naproxen sodium (ANAPROX) 220 MG tablet, Take 220 mg by mouth as needed for moderate pain  nitroGLYcerin (NITROSTAT) 0.4 MG sublingual tablet, Place under the tongue every 5 minutes as needed  potassium chloride ER (K-TAB) 20 MEQ CR tablet, Take 1 tablet (20 mEq) by mouth daily  rosuvastatin (CRESTOR) 40 MG tablet, Take 1 tablet (40 mg) by mouth daily  Turmeric 500 MG CAPS, Take 3 capsules (1,500 mg) by mouth daily  Zinc Sulfate (ZINC 15 PO), Take 50 mg by mouth daily " Dose unknown    No current facility-administered medications on file prior to visit.       Allergies: Amoxicillin, Contrast dye, Imdur [isosorbide], and Lopressor [metoprolol]    Immunization History   Administered Date(s) Administered     COVID-19 Vaccine 12+ (Pfizer) 03/02/2021, 03/25/2021, 11/22/2021     FLUAD(HD)65+ QUAD 10/26/2021     HEPA 03/04/2015     Influenza (High Dose) 3 valent vaccine 11/17/2017, 11/11/2019     Influenza (IIV3) PF 09/16/2009, 11/02/2010, 09/23/2011, 01/12/2015, 01/15/2015     Influenza Vaccine 65+ (Fluzone HD) 09/28/2020     Influenza Vaccine >6 months (Alfuria,Fluzone) 10/23/2015     Pneumo Conj 13-V (2010&after) 10/23/2015     Pneumococcal 23 valent 02/23/2010     TD (ADULT, 7+) 01/01/1998, 04/06/2006     TDAP Vaccine (Boostrix) 06/17/2013     Zoster vaccine recombinant adjuvanted (SHINGRIX) 01/22/2021, 05/10/2021     Zoster vaccine, live 01/30/2009             OBJECTIVE:/72 (BP Location: Right arm, Patient Position: Chair, Cuff Size: Adult Regular)   Pulse 76   Temp 97  F (36.1  C) (Temporal)   Resp 20   Ht 1.829 m (6')   Wt 84.7 kg (186 lb 12.8 oz)   BMI 25.33 kg/m     He appears well, vital signs are as noted by the nurse. Ears normal.  Throat and pharynx normal.  Neck supple. No adenopathy in the neck. Nose is congested. Sinuses non tender. The chest is clear, without wheezes or rales.  Exam:  GENERAL APPEARANCE: healthy, alert and no distress  EYES: EOMI,  PERRL  HENT: ear canals and TM's normal and nose and mouth without ulcers or lesions  RESP: lungs clear to auscultation - no rales, rhonchi or wheezes  CV: regular rates and rhythm, normal S1 S2, no S3 or S4 and no murmur, click or rub -  ABDOMEN:  soft, nontender, no HSM or masses and bowel sounds normal  MS: extremities normal- no gross deformities noted, no evidence of inflammation in joints, FROM in all extremities.  PSYCH: mentation appears normal and affect normal/bright    ASSESSMENT:   (J06.9) Viral upper  respiratory tract infection  (primary encounter diagnosis)  Comment: impriving  Plan:  I have suggested that the patient   rest, drink plenty of clear fluids, and use OTC medications as desired. Follow up if failure to improve or worsening of symptoms.     (R20.0) Numbness of toes  Comment: pt with likely peripheral neuropathy-on gabapentin but he was not sure why-discussed option to increase dose if symptoms more bothersome at night but we agree to try sleep aid first  Plan: consider increasing gabapentin in future    (R53.83) Fatigue, unspecified type  Comment: differential diagnosis includes anemia, hypothyroidism, depression, sleep problems (apnea), chronic illness, low testosterone,  and medications       Suspect multifactorial but sleep certainly an issue    We agree to trial trazodone, I reviewed the risks, benefits, and possible side effects of the medication.  The patient had an opportunity to ask any questions regarding the treatment plan. The patient was encouraged to call my office if any problems.   Plan: traZODone (DESYREL) 50 MG tablet            (M79.10) Generalized muscle ache  Comment: discussed possible relation to statin, possible DJD  Plan: consider trial off statin if persists but he would like to coninute for now    (R35.0) Urinary frequency  Comment: likely BPH. No clear uti but cx pending   Plan: PSA Total ([facility]), VENOUS COLLECTION,         URINALYSIS, ROUTINE ([facility])        [facility]    (I25.10) Coronary artery disease involving native coronary artery of native heart without angina pectoris  Comment: stable  Plan: nitroGLYcerin (NITROSTAT) 0.4 MG sublingual         tablet        continue current medications at current doses

## 2023-02-20 NOTE — NURSING NOTE
Assessment/Plan    Mallet deformity of left ring finger [M20 012]  1  Mallet deformity of left ring finger  Orthopedics   2  Finger injury, left, initial encounter  XR finger left fourth digit-ring         Subjective:     Patient ID: Sergo Byrne  is a 48 y o  male  Reason For Visit / Chief Complaint  Chief Complaint   Patient presents with    Hand Pain     Pt jammed left ring finger two weeks ago  Unable to straighten finger  Rates pain 1/10  This is a 51-year-old male patient who presents to the urgent care today  Patient jammed his left ring finger approximately 2 weeks ago  He has been unable to straighten it since then  He is here today for evaluation  Patient denies redness, swelling, fever or chills  Patient denies chest pain or shortness of breath  Patient denies aggravating or alleviating factors  Past Medical History:   Diagnosis Date    Known health problems: none        History reviewed  No pertinent surgical history  Family History   Problem Relation Age of Onset    Hyperlipidemia Mother     Diabetes Father     COPD Father     Hyperlipidemia Father     Hypertension Father        Review of Systems   Constitutional: Negative for chills and fever  Respiratory: Negative for shortness of breath  Cardiovascular: Negative for chest pain  Musculoskeletal:        Unable to straighten left ring finger   Neurological: Negative for headaches  Objective:    /57   Pulse 63   Temp (!) 97 1 °F (36 2 °C)   Resp 16   Ht 5' 8" (1 727 m)   Wt 83 5 kg (184 lb)   SpO2 98%   BMI 27 98 kg/m²     Physical Exam   Constitutional: He is oriented to person, place, and time  Vital signs are normal  He appears well-developed and well-nourished  HENT:   Head: Normocephalic and atraumatic  Cardiovascular: Normal rate, regular rhythm and normal heart sounds  Exam reveals no gallop and no friction rub  No murmur heard    Pulmonary/Chest: Effort normal and Nilesh Dos Santos is here for congestion last week. Started Tuesday with heavy chest, chills, cough and congestion. Started feeling better Saturday.   Still has cough    Questioned patient about current smoking habits.  Pt. quit smoking some time ago.  PULSE regular  My Chart: active  CLASSIFICATION OF OVERWEIGHT AND OBESITY BY BMI                        Obesity Class           BMI(kg/m2)  Underweight                                    < 18.5  Normal                                         18.5-24.9  Overweight                                     25.0-29.9  OBESITY                     I                  30.0-34.9                             II                 35.0-39.9  EXTREME OBESITY             III                >40                            Patient's  BMI Body mass index is 25.33 kg/m .  http://hin.nhlbi.nih.gov/menuplanner/menu.cgi  Pre-visit planning  Immunizations - up to date  Colonoscopy -   Mammogram -   Asthma -   PHQ9 -    BARTOLO-7 -           breath sounds normal  No stridor  No respiratory distress  He has no wheezes  He has no rales  He exhibits no tenderness  Musculoskeletal:        Hands:  Neurological: He is alert and oriented to person, place, and time  Skin: Skin is warm and dry  Capillary refill takes less than 2 seconds  Psychiatric: He has a normal mood and affect  Nursing note and vitals reviewed  Xray: mallet deformity    When sitting patient up Orthopedics at discharge time, patient states that he is going on vacation tomorrow and will not return for 8 days  Patient is advised to see Orthopedics sooner rather than later  Wear unable to splint the finger here as it has been in this position for over 2 weeks  At discharge time, patient states that his actually been longer than 2 weeks

## 2023-02-21 LAB — ABBOTT PSA - QUEST: 0.16 NG/ML

## 2023-02-22 LAB — URINE VOIDED CULTURE: NORMAL

## 2023-07-03 ENCOUNTER — TRANSFERRED RECORDS (OUTPATIENT)
Dept: FAMILY MEDICINE | Facility: CLINIC | Age: 78
End: 2023-07-03

## 2023-08-10 NOTE — PROGRESS NOTES
SUBJECTIVE: Nilesh Dos Santos is a 76 year old male who complains of urinary frequency, urgency and slight dysuria x many days, without flank pain, fever, chills, or abnormal vaginal discharge or bleeding. Pt feels it never completely resolved after UTI treatment 8/20    Pt had UTI 8/20-m treated with macrobid(sensitivity was good)    OBJECTIVE: Appears well, in no apparent distress.  Vital signs are normal. The abdomen is soft without tenderness, guarding, mass, rebound or organomegaly. No CVA tenderness or inguinal adenopathy noted. Urine dipstick shows positive for WBC's, positive for RBC's, positive for leukocytes and positive for bacturia.  Micro exam: 10-25 WBC's per HPF and 2-5 RBC's per HPF.     ASSESSMENT: UTI uncomplicated without evidence of pyelonephritis-will do macrobid again as last rx 8/20-suspect some of his ongoing issues over months more BPH related, recent PSA good, can consider BPH treatments as well    PLAN: Treatment per orders - also push fluids, may use Pyridium OTC prn. Call or return to clinic prn if these symptoms worsen or fail to improve as anticipated.    Weight Loss Plan: Care Instructions  Overview     If you're thinking about losing weight, it can be hard to know where to start. Your doctor can help you set up a weight loss plan that best meets your needs. You may want to take a class on nutrition or exercise, or you could join a weight loss support group. If you have questions about how to make changes to your eating or exercise habits, ask your doctor about seeing a registered dietitian or an exercise specialist.  It can be a big challenge to lose weight. But you don't have to make huge changes at once. Make small changes, and stick with them. When those changes become habit, add a few more changes. If you don't think you're ready to make changes right now, try to pick a date in the future. Make an appointment to see your doctor to discuss whether the time is right for you to start a plan. Follow-up care is a key part of your treatment and safety. Be sure to make and go to all appointments, and call your doctor if you are having problems. It's also a good idea to know your test results and keep a list of the medicines you take. How can you care for yourself at home? Set realistic goals. Many people expect to lose much more weight than is likely. A weight loss of 5% to 10% of your body weight may be enough to improve your health. Get family and friends involved to provide support. Talk to them about why you are trying to lose weight, and ask them to help. They can help by participating in exercise and having meals with you, even if they may be eating something different. Find what works best for you. If you do not have time or do not like to cook, a program that offers meal replacement bars or shakes may be better for you. Or if you like to prepare meals, finding a plan that includes daily menus and recipes may be best.  Ask your doctor about other health professionals who can help you achieve your weight loss goals.   A dietitian can help you make healthy

## 2023-11-15 DIAGNOSIS — I10 ESSENTIAL HYPERTENSION, BENIGN: ICD-10-CM

## 2023-11-15 DIAGNOSIS — E78.2 MIXED HYPERLIPIDEMIA: ICD-10-CM

## 2023-11-15 RX ORDER — HYDROCHLOROTHIAZIDE 25 MG/1
25 TABLET ORAL DAILY
Qty: 90 TABLET | Refills: 0 | Status: SHIPPED | OUTPATIENT
Start: 2023-11-15 | End: 2024-01-12

## 2023-11-15 RX ORDER — ROSUVASTATIN CALCIUM 40 MG/1
40 TABLET, COATED ORAL DAILY
Qty: 90 TABLET | Refills: 0 | Status: SHIPPED | OUTPATIENT
Start: 2023-11-15 | End: 2024-01-12

## 2024-01-08 ENCOUNTER — LAB (OUTPATIENT)
Dept: LAB | Facility: CLINIC | Age: 79
End: 2024-01-08
Attending: INTERNAL MEDICINE
Payer: COMMERCIAL

## 2024-01-08 DIAGNOSIS — I25.10 CORONARY ARTERY DISEASE INVOLVING NATIVE CORONARY ARTERY OF NATIVE HEART WITHOUT ANGINA PECTORIS: ICD-10-CM

## 2024-01-08 LAB
ALT SERPL W P-5'-P-CCNC: 11 U/L (ref 0–70)
ANION GAP SERPL CALCULATED.3IONS-SCNC: 8 MMOL/L (ref 7–15)
BUN SERPL-MCNC: 14.6 MG/DL (ref 8–23)
CALCIUM SERPL-MCNC: 9.3 MG/DL (ref 8.8–10.2)
CHLORIDE SERPL-SCNC: 104 MMOL/L (ref 98–107)
CHOLEST SERPL-MCNC: 119 MG/DL
CREAT SERPL-MCNC: 1.55 MG/DL (ref 0.67–1.17)
DEPRECATED HCO3 PLAS-SCNC: 30 MMOL/L (ref 22–29)
EGFRCR SERPLBLD CKD-EPI 2021: 46 ML/MIN/1.73M2
FASTING STATUS PATIENT QL REPORTED: YES
GLUCOSE SERPL-MCNC: 110 MG/DL (ref 70–99)
HDLC SERPL-MCNC: 55 MG/DL
LDLC SERPL CALC-MCNC: 44 MG/DL
NONHDLC SERPL-MCNC: 64 MG/DL
POTASSIUM SERPL-SCNC: 3.5 MMOL/L (ref 3.4–5.3)
SODIUM SERPL-SCNC: 142 MMOL/L (ref 135–145)
TRIGL SERPL-MCNC: 100 MG/DL

## 2024-01-08 PROCEDURE — 80061 LIPID PANEL: CPT | Performed by: INTERNAL MEDICINE

## 2024-01-08 PROCEDURE — 80048 BASIC METABOLIC PNL TOTAL CA: CPT | Performed by: INTERNAL MEDICINE

## 2024-01-08 PROCEDURE — 36415 COLL VENOUS BLD VENIPUNCTURE: CPT | Performed by: INTERNAL MEDICINE

## 2024-01-08 PROCEDURE — 84460 ALANINE AMINO (ALT) (SGPT): CPT | Performed by: INTERNAL MEDICINE

## 2024-01-12 ENCOUNTER — OFFICE VISIT (OUTPATIENT)
Dept: CARDIOLOGY | Facility: CLINIC | Age: 79
End: 2024-01-12
Payer: COMMERCIAL

## 2024-01-12 VITALS
WEIGHT: 189.2 LBS | SYSTOLIC BLOOD PRESSURE: 114 MMHG | HEART RATE: 64 BPM | OXYGEN SATURATION: 99 % | DIASTOLIC BLOOD PRESSURE: 58 MMHG | HEIGHT: 72 IN | BODY MASS INDEX: 25.63 KG/M2

## 2024-01-12 DIAGNOSIS — I25.10 CORONARY ARTERY DISEASE INVOLVING NATIVE CORONARY ARTERY OF NATIVE HEART WITHOUT ANGINA PECTORIS: ICD-10-CM

## 2024-01-12 DIAGNOSIS — E78.2 MIXED HYPERLIPIDEMIA: ICD-10-CM

## 2024-01-12 DIAGNOSIS — I10 ESSENTIAL HYPERTENSION, BENIGN: ICD-10-CM

## 2024-01-12 PROCEDURE — 99214 OFFICE O/P EST MOD 30 MIN: CPT | Performed by: INTERNAL MEDICINE

## 2024-01-12 RX ORDER — IPRATROPIUM BROMIDE 21 UG/1
2 SPRAY, METERED NASAL
COMMUNITY
Start: 2023-06-01

## 2024-01-12 RX ORDER — HYDROCHLOROTHIAZIDE 25 MG/1
25 TABLET ORAL DAILY
Qty: 90 TABLET | Refills: 3 | Status: SHIPPED | OUTPATIENT
Start: 2024-01-12 | End: 2024-01-16

## 2024-01-12 RX ORDER — ROSUVASTATIN CALCIUM 40 MG/1
40 TABLET, COATED ORAL DAILY
Qty: 90 TABLET | Refills: 3 | Status: SHIPPED | OUTPATIENT
Start: 2024-01-12 | End: 2024-01-16

## 2024-01-12 RX ORDER — POTASSIUM CHLORIDE 1500 MG/1
20 TABLET, EXTENDED RELEASE ORAL DAILY
Qty: 90 TABLET | Refills: 3 | Status: SHIPPED | OUTPATIENT
Start: 2024-01-12

## 2024-01-12 RX ORDER — UREA 10 %
500 LOTION (ML) TOPICAL DAILY
COMMUNITY

## 2024-01-12 ASSESSMENT — PATIENT HEALTH QUESTIONNAIRE - PHQ9: SUM OF ALL RESPONSES TO PHQ QUESTIONS 1-9: 12

## 2024-01-12 NOTE — PROGRESS NOTES
HPI and Plan:   Mr. Dos Santos is a very pleasant 78-year-old gentleman with history of chronic stable angina in the past, with coronary CT angiogram in 2013 showing diffuse coronary artery calcification and then subsequent coronary angiogram in 2015 when he was found to have moderate stenosis involving the RPDA and posterolateral branch with FFR of 0.9 and 0.94, respectively, indicating they were not flow-limiting lesions.  He also has history of hypertension and is on hydrochlorothiazide.  Echocardiogram July 2021 showed normal LV function without any significant valvular abnormalities.  Exercise stress echocardiogram around the same time showed no evidence of inducible ischemia.  Today patient is coming for routine follow-up.  Recent labs shows LDL well-controlled 44.  Kidney functions shows potassium of 3.5 and to be noted he had hypokalemia in response to hydrochlorothiazide and is on oral potassium supplementation.  He does have CKD stage III creatinine 1.55.  Patient tells me heart health wise he feels well.  His biggest issue is progressively worsening arthritis involving the knee and also chronic low back pain which is also getting progressively worse.  No dizziness presyncope syncope chest discomfort or shortness of breath.  Because of progressively worsening arthritis and back discomfort he has been taking Tylenol and ibuprofen on a regular basis.      Assessment and plan  CAD.  Clinically no anginal symptoms.  On aspirin, high intensity statin.  LDL well-controlled  Hypertension on hydrochlorothiazide.  Blood pressure controlled.  Progressively worsening arthritis involving the knee and chronic back pain  CKD    Recommendations  Overall cardiac status wise he is stable  Continue aspirin, high intensity statin, hydrochlorothiazide  Caution and educated patient to minimize use of NSAIDs especially with underlying CAD, chronic kidney disease and to take plenty of water and not to take ibuprofen on empty  stomach.  Follow-up in a year, sooner if he notes any change in clinical status.    Today's clinic visit entailed:  Review of the result(s) of each unique test - BMP, lipid panel, ALT        Orders Placed This Encounter   Procedures    Lipid Profile    ALT    Basic metabolic panel    Follow-Up with Cardiology       Orders Placed This Encounter   Medications    cyanocobalamin (VITAMIN B-12) 500 MCG tablet     Sig: Take 500 mcg by mouth daily    UNABLE TO FIND     Sig: Take 1 tablet by mouth daily Neuriva    UNABLE TO FIND     Sig: Take 1 tablet by mouth at bedtime Relaxium Sleep    UNABLE TO FIND     Sig: Take 1 tablet by mouth 2 times daily Super Beta Prostate    ACETAMINOPHEN PO     Sig: Take 600 mg by mouth 2 times daily    ipratropium (ATROVENT) 0.03 % nasal spray     Sig: Spray 2 sprays in nostril    hydrochlorothiazide (HYDRODIURIL) 25 MG tablet     Sig: Take 1 tablet (25 mg) by mouth daily     Dispense:  90 tablet     Refill:  3    potassium chloride ER (K-TAB) 20 MEQ CR tablet     Sig: Take 1 tablet (20 mEq) by mouth daily     Dispense:  90 tablet     Refill:  3    rosuvastatin (CRESTOR) 40 MG tablet     Sig: Take 1 tablet (40 mg) by mouth daily     Dispense:  90 tablet     Refill:  3       Medications Discontinued During This Encounter   Medication Reason    Cranberry 1000 MG CAPS     naproxen sodium (ANAPROX) 220 MG tablet     potassium chloride ER (K-TAB) 20 MEQ CR tablet     hydrochlorothiazide (HYDRODIURIL) 25 MG tablet     rosuvastatin (CRESTOR) 40 MG tablet          Encounter Diagnoses   Name Primary?    Essential hypertension, benign     Coronary artery disease involving native coronary artery of native heart without angina pectoris     Mixed hyperlipidemia        CURRENT MEDICATIONS:  Current Outpatient Medications   Medication Sig Dispense Refill    ACETAMINOPHEN PO Take 600 mg by mouth 2 times daily      aspirin 81 MG tablet Take by mouth daily      Cholecalciferol (VITAMIN D3) 25 MCG (1000 UT)  CAPS       co-enzyme Q-10 100 MG CAPS capsule Take 100 mg by mouth daily      cyanocobalamin (VITAMIN B-12) 500 MCG tablet Take 500 mcg by mouth daily      diphenhydrAMINE (BENADRYL) 25 MG tablet Take 25 mg by mouth at bedtime 14 tablet     fluticasone (FLONASE) 50 MCG/ACT spray Spray 1-2 sprays into both nostrils daily 3 Bottle 3    gabapentin (NEURONTIN) 300 MG capsule Take 1 capsule (300 mg) by mouth daily      hydrochlorothiazide (HYDRODIURIL) 25 MG tablet Take 1 tablet (25 mg) by mouth daily 90 tablet 3    ibuprofen (ADVIL/MOTRIN) 200 MG capsule Take 200 mg by mouth 2 times daily      ipratropium (ATROVENT) 0.03 % nasal spray Spray 2 sprays in nostril      nitroGLYcerin (NITROSTAT) 0.4 MG sublingual tablet Place 1 tablet (0.4 mg) under the tongue every 5 minutes as needed 20 tablet 0    potassium chloride ER (K-TAB) 20 MEQ CR tablet Take 1 tablet (20 mEq) by mouth daily 90 tablet 3    rosuvastatin (CRESTOR) 40 MG tablet Take 1 tablet (40 mg) by mouth daily 90 tablet 3    traZODone (DESYREL) 50 MG tablet Take 1 tablet (50 mg) by mouth At Bedtime 30 tablet 0    TURMERIC PO Take 1,400 mg by mouth 2 times daily      UNABLE TO FIND Take 1 tablet by mouth daily Neuriva      UNABLE TO FIND Take 1 tablet by mouth at bedtime Relaxium Sleep      UNABLE TO FIND Take 1 tablet by mouth 2 times daily Super Beta Prostate      Zinc Sulfate (ZINC 15 PO) Take 50 mg by mouth daily Dose unknown      montelukast (SINGULAIR) 10 MG tablet Take 10 mg by mouth At Bedtime (Patient not taking: Reported on 1/12/2024)         ALLERGIES     Allergies   Allergen Reactions    Amoxicillin      severe rash    Contrast Dye Hives     Patient states this was years ago and he believes he has had dye since that time without problems.    Imdur [Isosorbide Nitrate] Other (See Comments)     headache    Lopressor [Metoprolol] Fatigue       PAST MEDICAL HISTORY:  Past Medical History:   Diagnosis Date    CAD (coronary artery disease)     cardiac cath  "2015: non-obstructive diffuse CAD     Chest pain     chronic stable    Chronic stable angina (H)     Contact dermatitis and other eczema, due to unspecified cause     DEPRESSIVE DISORDER NEC     Dyslipidemia     Fam hx-cardiovas dis NEC     Mother and Father    Hernia, abdominal     HTN (hypertension)     IRRITABLE COLON     Mild aortic stenosis     Mumps     Personal history of urinary calculi     Polyneuropathy     Skin cancer, basal cell 2008    SOB (shortness of breath)     Varicose veins        PAST SURGICAL HISTORY:  Past Surgical History:   Procedure Laterality Date    ANGIOGRAM  2015    Med Tx, no flow limiting lesions    COLONOSCOPY Left 2018    Procedure: COLONOSCOPY;  colonoscopy (fv)  ;  Surgeon: Nilesh Cervantes MD;  Location:  GI    CYSTOSCOPY      EYE EXAM ESTABLISHED PT      HC EXCISE VARICOCELE      \"cautery\" through intra venous approach    HC KNEE SCOPE, DIAGNOSTIC      Arthroscopy, Knee/left knee     HC REPAIR ING HERNIA,5+Y/O,REDUCIBL      Inguinal Hernia Repair  Right    TEST NOT FOUND      Per cut removal of L kidney stone    TESTICLE SURGERY  age 35    Varicocoele repair    VASECTOMY  age 30    ZZC URETER ENDOSCOPY THRU URETEROSTOMY REMV FB OR CALCULUS      dr de la garza    ZZHC COLONOSCOPY THRU STOMA, DIAGNOSTIC         FAMILY HISTORY:  Family History   Problem Relation Age of Onset    Heart Disease Mother         91    Cancer - colorectal Mother     Heart Disease Father          70s    Diabetes Maternal Aunt     Alzheimer Disease No family hx of     Cancer No family hx of     Prostate Cancer No family hx of        SOCIAL HISTORY:  Social History     Socioeconomic History    Marital status:      Spouse name: Velia    Number of children: 2    Years of education: 18   Occupational History    Occupation: Consultant     Employer: SELF   Tobacco Use    Smoking status: Former     Types: Cigars     Quit date: 1975     Years since quittin.0    " Smokeless tobacco: Never    Tobacco comments:     2-3 cigars   Substance and Sexual Activity    Alcohol use: Yes     Alcohol/week: 3.3 standard drinks of alcohol     Types: 4 Glasses of wine per week     Comment: 1-2 glass of wine daily    Drug use: No    Sexual activity: Yes     Partners: Female     Birth control/protection: Surgical     Comment: vas   Other Topics Concern     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, , [employer]     Blood Transfusions No    Caffeine Concern Yes     Comment: 2 cups per day    Occupational Exposure No    Hobby Hazards No    Sleep Concern No    Stress Concern No    Weight Concern No    Special Diet No    Exercise No    Seat Belt Yes    Self-Exams No     Social Determinants of Health     Financial Resource Strain: Low Risk  (8/20/2020)    Overall Financial Resource Strain (CARDIA)     Difficulty of Paying Living Expenses: Not hard at all   Food Insecurity: No Food Insecurity (8/20/2020)    Hunger Vital Sign     Worried About Running Out of Food in the Last Year: Never true     Ran Out of Food in the Last Year: Never true   Transportation Needs: No Transportation Needs (8/20/2020)    PRAPARE - Transportation     Lack of Transportation (Medical): No     Lack of Transportation (Non-Medical): No       Review of Systems:  Skin:          Eyes:         ENT:         Respiratory:  Negative       Cardiovascular:  Negative      Gastroenterology:        Genitourinary:  Positive for nocturia    Musculoskeletal:         Neurologic:         Psychiatric:         Heme/Lymph/Imm:         Endocrine:           Physical Exam:  Vitals: /58 (BP Location: Right arm, Patient Position: Sitting, Cuff Size: Adult Regular)   Pulse 64   Ht 1.829 m (6')   Wt 85.8 kg (189 lb 3.2 oz)   SpO2 99%   BMI 25.66 kg/m      General patient appears comfortable  Neck normal JVP  Cardiovascular system S1-S2 normal no murmur rub or gallop  Respiratory system clear to auscultation  Extremities no  edema  Neurological alert, oriented    CC  Bharat Mata MD  [id number]  [place] [id number]

## 2024-01-12 NOTE — PROGRESS NOTES
Depression Response    Patient completed the PHQ-9 assessment for depression and scored >9? Yes  Question 9 on the PHQ-9 was positive for suicidality? No  Does patient have current mental health provider? No    Is this a virtual visit? No    I personally notified the following: clinic nurse

## 2024-01-12 NOTE — PROGRESS NOTES
Depression Screening Follow-up        1/12/2024     7:56 AM   PHQ   PHQ-9 Total Score 12   Q9: Thoughts of better off dead/self-harm past 2 weeks Not at all       Does the patient currently have a mental health provider?  No  Follow Up Actions Taken  Patient to follow up with PCP. Clinic staff to schedule appointment if able.  Spoke with pt, his concerns are regarding knee pain, back pain and lack of sleep at night.  He will speak with his PCP to address.  Pt states there are no further resources he needs at this time.      Kaye L. Mortimer, RN

## 2024-01-12 NOTE — LETTER
1/12/2024    Fuentes Carr DO  [id number]  [facility] [address]    RE: Nilesh Dos Santos       Dear Colleague,     I had the pleasure of seeing Nilesh Dos Santos in the [facility].  Depression Response    Patient completed the PHQ-9 assessment for depression and scored >9? Yes  Question 9 on the PHQ-9 was positive for suicidality? No  Does patient have current mental health provider? No    Is this a virtual visit? No    I personally notified the following: clinic nurse            Depression Screening Follow-up        1/12/2024     7:56 AM   PHQ   PHQ-9 Total Score 12   Q9: Thoughts of better off dead/self-harm past 2 weeks Not at all       Does the patient currently have a mental health provider?  No  Follow Up Actions Taken  Patient to follow up with PCP. Clinic staff to schedule appointment if able.  Spoke with pt, his concerns are regarding knee pain, back pain and lack of sleep at night.  He will speak with his PCP to address.  Pt states there are no further resources he needs at this time.      Kaye L. Mortimer, RN       HPI and Plan:   Mr. Dos Santos is a very pleasant 78-year-old gentleman with history of chronic stable angina in the past, with coronary CT angiogram in 2013 showing diffuse coronary artery calcification and then subsequent coronary angiogram in 2015 when he was found to have moderate stenosis involving the RPDA and posterolateral branch with FFR of 0.9 and 0.94, respectively, indicating they were not flow-limiting lesions.  He also has history of hypertension and is on hydrochlorothiazide.  Echocardiogram July 2021 showed normal LV function without any significant valvular abnormalities.  Exercise stress echocardiogram around the same time showed no evidence of inducible ischemia.  Today patient is coming for routine follow-up.  Recent labs shows LDL well-controlled 44.  Kidney functions shows potassium of 3.5 and to be noted he had hypokalemia in response to  hydrochlorothiazide and is on oral potassium supplementation.  He does have CKD stage III creatinine 1.55.  Patient tells me heart health wise he feels well.  His biggest issue is progressively worsening arthritis involving the knee and also chronic low back pain which is also getting progressively worse.  No dizziness presyncope syncope chest discomfort or shortness of breath.  Because of progressively worsening arthritis and back discomfort he has been taking Tylenol and ibuprofen on a regular basis.      Assessment and plan  CAD.  Clinically no anginal symptoms.  On aspirin, high intensity statin.  LDL well-controlled  Hypertension on hydrochlorothiazide.  Blood pressure controlled.  Progressively worsening arthritis involving the knee and chronic back pain  CKD    Recommendations  Overall cardiac status wise he is stable  Continue aspirin, high intensity statin, hydrochlorothiazide  Caution and educated patient to minimize use of NSAIDs especially with underlying CAD, chronic kidney disease and to take plenty of water and not to take ibuprofen on empty stomach.  Follow-up in a year, sooner if he notes any change in clinical status.    Today's clinic visit entailed:  Review of the result(s) of each unique test - BMP, lipid panel, ALT        Orders Placed This Encounter   Procedures    Lipid Profile    ALT    Basic metabolic panel    Follow-Up with Cardiology       Orders Placed This Encounter   Medications    cyanocobalamin (VITAMIN B-12) 500 MCG tablet     Sig: Take 500 mcg by mouth daily    UNABLE TO FIND     Sig: Take 1 tablet by mouth daily Neuriva    UNABLE TO FIND     Sig: Take 1 tablet by mouth at bedtime Relaxium Sleep    UNABLE TO FIND     Sig: Take 1 tablet by mouth 2 times daily Super Beta Prostate    ACETAMINOPHEN PO     Sig: Take 600 mg by mouth 2 times daily    ipratropium (ATROVENT) 0.03 % nasal spray     Sig: Spray 2 sprays in nostril    hydrochlorothiazide (HYDRODIURIL) 25 MG tablet     Sig: Take  1 tablet (25 mg) by mouth daily     Dispense:  90 tablet     Refill:  3    potassium chloride ER (K-TAB) 20 MEQ CR tablet     Sig: Take 1 tablet (20 mEq) by mouth daily     Dispense:  90 tablet     Refill:  3    rosuvastatin (CRESTOR) 40 MG tablet     Sig: Take 1 tablet (40 mg) by mouth daily     Dispense:  90 tablet     Refill:  3       Medications Discontinued During This Encounter   Medication Reason    Cranberry 1000 MG CAPS     naproxen sodium (ANAPROX) 220 MG tablet     potassium chloride ER (K-TAB) 20 MEQ CR tablet     hydrochlorothiazide (HYDRODIURIL) 25 MG tablet     rosuvastatin (CRESTOR) 40 MG tablet          Encounter Diagnoses   Name Primary?    Essential hypertension, benign     Coronary artery disease involving native coronary artery of native heart without angina pectoris     Mixed hyperlipidemia        CURRENT MEDICATIONS:  Current Outpatient Medications   Medication Sig Dispense Refill    ACETAMINOPHEN PO Take 600 mg by mouth 2 times daily      aspirin 81 MG tablet Take by mouth daily      Cholecalciferol (VITAMIN D3) 25 MCG (1000 UT) CAPS       co-enzyme Q-10 100 MG CAPS capsule Take 100 mg by mouth daily      cyanocobalamin (VITAMIN B-12) 500 MCG tablet Take 500 mcg by mouth daily      diphenhydrAMINE (BENADRYL) 25 MG tablet Take 25 mg by mouth at bedtime 14 tablet     fluticasone (FLONASE) 50 MCG/ACT spray Spray 1-2 sprays into both nostrils daily 3 Bottle 3    gabapentin (NEURONTIN) 300 MG capsule Take 1 capsule (300 mg) by mouth daily      hydrochlorothiazide (HYDRODIURIL) 25 MG tablet Take 1 tablet (25 mg) by mouth daily 90 tablet 3    ibuprofen (ADVIL/MOTRIN) 200 MG capsule Take 200 mg by mouth 2 times daily      ipratropium (ATROVENT) 0.03 % nasal spray Spray 2 sprays in nostril      nitroGLYcerin (NITROSTAT) 0.4 MG sublingual tablet Place 1 tablet (0.4 mg) under the tongue every 5 minutes as needed 20 tablet 0    potassium chloride ER (K-TAB) 20 MEQ CR tablet Take 1 tablet (20 mEq) by  "mouth daily 90 tablet 3    rosuvastatin (CRESTOR) 40 MG tablet Take 1 tablet (40 mg) by mouth daily 90 tablet 3    traZODone (DESYREL) 50 MG tablet Take 1 tablet (50 mg) by mouth At Bedtime 30 tablet 0    TURMERIC PO Take 1,400 mg by mouth 2 times daily      UNABLE TO FIND Take 1 tablet by mouth daily Neuriva      UNABLE TO FIND Take 1 tablet by mouth at bedtime Relaxium Sleep      UNABLE TO FIND Take 1 tablet by mouth 2 times daily Super Beta Prostate      Zinc Sulfate (ZINC 15 PO) Take 50 mg by mouth daily Dose unknown      montelukast (SINGULAIR) 10 MG tablet Take 10 mg by mouth At Bedtime (Patient not taking: Reported on 1/12/2024)         ALLERGIES     Allergies   Allergen Reactions    Amoxicillin      severe rash    Contrast Dye Hives     Patient states this was years ago and he believes he has had dye since that time without problems.    Imdur [Isosorbide Nitrate] Other (See Comments)     headache    Lopressor [Metoprolol] Fatigue       PAST MEDICAL HISTORY:  Past Medical History:   Diagnosis Date    CAD (coronary artery disease)     cardiac cath 2015: non-obstructive diffuse CAD     Chest pain     chronic stable    Chronic stable angina (H)     Contact dermatitis and other eczema, due to unspecified cause     DEPRESSIVE DISORDER NEC     Dyslipidemia     Fam hx-cardiovas dis NEC     Mother and Father    Hernia, abdominal     HTN (hypertension)     IRRITABLE COLON     Mild aortic stenosis     Mumps     Personal history of urinary calculi     Polyneuropathy     Skin cancer, basal cell 2008    SOB (shortness of breath)     Varicose veins        PAST SURGICAL HISTORY:  Past Surgical History:   Procedure Laterality Date    ANGIOGRAM  11/17/2015    Med Tx, no flow limiting lesions    COLONOSCOPY Left 5/29/2018    Procedure: COLONOSCOPY;  colonoscopy (fv)  ;  Surgeon: Nilesh Cervantes MD;  Location:  GI    CYSTOSCOPY      EYE EXAM ESTABLISHED PT  2009    HC EXCISE VARICOCELE      \"cautery\" through intra venous " approach    HC KNEE SCOPE, DIAGNOSTIC      Arthroscopy, Knee/left knee     HC REPAIR ING HERNIA,5+Y/O,REDUCIBL      Inguinal Hernia Repair  Right    TEST NOT FOUND      Per cut removal of L kidney stone    TESTICLE SURGERY  age 35    Varicocoele repair    VASECTOMY  age 30    ZZC URETER ENDOSCOPY THRU URETEROSTOMY REMV FB OR CALCULUS      dr de la garza    ZZHC COLONOSCOPY THRU STOMA, DIAGNOSTIC  2005       FAMILY HISTORY:  Family History   Problem Relation Age of Onset    Heart Disease Mother         91    Cancer - colorectal Mother     Heart Disease Father          70s    Diabetes Maternal Aunt     Alzheimer Disease No family hx of     Cancer No family hx of     Prostate Cancer No family hx of        SOCIAL HISTORY:  Social History     Socioeconomic History    Marital status:      Spouse name: Velia    Number of children: 2    Years of education: 18   Occupational History    Occupation: Consultant     Employer: SELF   Tobacco Use    Smoking status: Former     Types: Cigars     Quit date: 1975     Years since quittin.0    Smokeless tobacco: Never    Tobacco comments:     2-3 cigars   Substance and Sexual Activity    Alcohol use: Yes     Alcohol/week: 3.3 standard drinks of alcohol     Types: 4 Glasses of wine per week     Comment: 1-2 glass of wine daily    Drug use: No    Sexual activity: Yes     Partners: Female     Birth control/protection: Surgical     Comment: vas   Other Topics Concern     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, , [employer]     Blood Transfusions No    Caffeine Concern Yes     Comment: 2 cups per day    Occupational Exposure No    Hobby Hazards No    Sleep Concern No    Stress Concern No    Weight Concern No    Special Diet No    Exercise No    Seat Belt Yes    Self-Exams No     Social Determinants of Health     Financial Resource Strain: Low Risk  (2020)    Overall Financial Resource Strain (CARDIA)     Difficulty of Paying Living Expenses:  Not hard at all   Food Insecurity: No Food Insecurity (8/20/2020)    Hunger Vital Sign     Worried About Running Out of Food in the Last Year: Never true     Ran Out of Food in the Last Year: Never true   Transportation Needs: No Transportation Needs (8/20/2020)    PRAPARE - Transportation     Lack of Transportation (Medical): No     Lack of Transportation (Non-Medical): No       Review of Systems:  Skin:          Eyes:         ENT:         Respiratory:  Negative       Cardiovascular:  Negative      Gastroenterology:        Genitourinary:  Positive for nocturia    Musculoskeletal:         Neurologic:         Psychiatric:         Heme/Lymph/Imm:         Endocrine:           Physical Exam:  Vitals: /58 (BP Location: Right arm, Patient Position: Sitting, Cuff Size: Adult Regular)   Pulse 64   Ht 1.829 m (6')   Wt 85.8 kg (189 lb 3.2 oz)   SpO2 99%   BMI 25.66 kg/m      General patient appears comfortable  Neck normal JVP  Cardiovascular system S1-S2 normal no murmur rub or gallop  Respiratory system clear to auscultation  Extremities no edema  Neurological alert, oriented    CC  Bharat Mata MD  [address]  [place]  [place] [address]                  Thank you for allowing me to participate in the care of your patient.      Sincerely,     Bharat Mata MD     [facility]

## 2024-01-16 DIAGNOSIS — I10 ESSENTIAL HYPERTENSION, BENIGN: ICD-10-CM

## 2024-01-16 DIAGNOSIS — E78.2 MIXED HYPERLIPIDEMIA: ICD-10-CM

## 2024-01-16 RX ORDER — HYDROCHLOROTHIAZIDE 25 MG/1
25 TABLET ORAL DAILY
Qty: 90 TABLET | Refills: 4 | Status: SHIPPED | OUTPATIENT
Start: 2024-01-16

## 2024-01-16 RX ORDER — ROSUVASTATIN CALCIUM 40 MG/1
40 TABLET, COATED ORAL DAILY
Qty: 90 TABLET | Refills: 4 | Status: SHIPPED | OUTPATIENT
Start: 2024-01-16

## 2024-03-22 ENCOUNTER — TELEPHONE (OUTPATIENT)
Dept: CARDIOLOGY | Facility: CLINIC | Age: 79
End: 2024-03-22
Payer: COMMERCIAL

## 2024-03-22 NOTE — TELEPHONE ENCOUNTER
[facility] Call Center    Phone Message    May a detailed message be left on voicemail: yes     Reason for Call: Other: Pt would like  a call back to discuss his medication as they had him hold 2 medication for knee surgery and he would like to discuss when to start taking them again      Action Taken: Other: Cardio    Travel Screening: Not Applicable

## 2024-03-22 NOTE — TELEPHONE ENCOUNTER
I called patient and he tells me that his surgeon who did his total keen surgery this past Wednesday had him hold his hydrochlorothiazide and KCL and is wanting to know when to resume.    I told him that he needs to reach out to the surgeon about when to resume both medications.    He said that he would do so.

## 2024-04-03 ENCOUNTER — TELEPHONE (OUTPATIENT)
Dept: CARDIOLOGY | Facility: CLINIC | Age: 79
End: 2024-04-03
Payer: COMMERCIAL

## 2024-04-03 NOTE — TELEPHONE ENCOUNTER
In review of patients chart, message dated 3/22/24 by Maxi SUAREZ:        I called patient and he tells me that his surgeon who did his total keen surgery this past Wednesday had him hold his hydrochlorothiazide and KCL and is wanting to know when to resume.     I told him that he needs to reach out to the surgeon about when to resume both medications.     He said that he would do so.    He has called the ortho surgeon, and they will not answer this question.  They asked him to go back to Dr. Mata for the answer.  Willie is checking vitals at least daily,  100\50,   94\45    pulse   89    This has been typical.  He is still recovering from TKA, and states that his pain is 6 to 8 out of 10 range.   His BP does not reflect that much pain.   He is also concerned about this.      He has not resumed the hydrochlorothiazide and potassium tablets.  He is concerned that his BP will be even lower, and that would create a different risk.    Routing to Dr. Mata for advice.

## 2024-04-03 NOTE — TELEPHONE ENCOUNTER
Received response from Dr. Mata, in agreement with not restarting meds at this time.      Call to Willie to let him know this information.  He verbalized understanding and will continue to monitor BP.  He will call us with any changes.    Sobia Crawford RN on 4/3/2024 at 3:11 PM

## 2024-04-03 NOTE — TELEPHONE ENCOUNTER
[facility] Call Center    Phone Message    May a detailed message be left on voicemail: yes     Reason for Call: Other: Pt is requesting a callback to discuss medications hydrochlorothiazide  and  potassium chloride.  Pt was taken off when he had surgery and has not taken it for 3 weeks.  He is asking if he should take it or can he stay off of it.     Action Taken: Other: cardio    Travel Screening: Not Applicable  Thank you!  [facility]

## 2024-08-03 ENCOUNTER — HEALTH MAINTENANCE LETTER (OUTPATIENT)
Age: 79
End: 2024-08-03

## 2024-08-19 NOTE — PROGRESS NOTES
~Cardiology Clinic Visit~    Primary Cardiologist: Dr. Mata  Reason for visit: Discuss results of recent echocardiogram      Nilesh Dos Santos MRN# [id number]   YOB: 1945 Age: 79 year old       HPI:    Nilesh Dos Santos is a delightful 79 year old patient with past medical history significant for:    Chronic stable angina  Coronary CTA in 2013 showed diffuse coronary artery calcification. Coronary angiogram 2015 showed moderate stenosis involving the rPDA and posterolateral branch with FFR 0.9 and 0.94.  Hypertension  Chronic kidney disease stage IIIa  Hyperlipidemia  Lower extremity edema  Chronic lymphocytic leukemia     Nilesh Dos Santos is patient of Dr. Mata and was last seen on January 12, 2024 for routine follow-up. During that visit he was doing well from a cardiac standpoint and free of chest discomfort, shortness of breath, syncope, or dizziness. At that time he was struggling with progressive worsening arthritis pain. He subsequently underwent left total knee arthroplasty in 3/2024.    Since that time, he was seen by his PCP for bilateral lower extremity swelling. He was started on furosemide 20 mg daily and advised to follow low sodium diet, elevate legs, and use compression stockings. On follow-up he continued to have swelling despite good urine response and increasing his lasix to 40 mg. Lymphedema consult was recommended.    Nilesh also follows with hem/onc for management of his chronic lymphocytic leukemia diagnosed around May this year. He was last seen on 8/2/2024 with plan for continued observation and monitoring of labs.    Today, he is here to discuss recent results of echocardiogram as well as concerns over fatigue and pain in his lower extremities. He was told at a recent office visit that he had a murmur, he recalls being told this as a child too however after 6 th grade it was no longer an issue. He denies chest pain, pressure, or shortness of breath. He does note some chest  "tightness, like a \"sore muscle\" that occurs at night when lying flat and worse when eating food. These symptoms do not occur with exertion and are not associated with any other symptoms.     In regards to his fatigue and bilateral lower extremity pain/swelling this has been ongoing for about 2-3 years. He notes that it has become worse since his knee surgery in March. He notices the pain in his legs mostly at night and feels this is contributing to his overall fatigue. He does where compression stockings which do help. He denies orthopnea or PND.    Initial blood pressure today was 144/36, recheck with manual cuff was 122/64. He has been off his hydrochlorothiazide since starting lasix this spring.     Diagnotic studies:  Echocardiogram 7/15/2024 (care everywhere):  1. Normal LV size, normal wall thickness, normal global systolic function with an estimated EF of 60 - 65%.    2. Left ventricular sigmoid basal hypertrophy no outflow obstruction.    3. The aortic valve is trileaflet and sclerotic, no stenosis and no regurgitation. The aortic valve peak velocity is 2.0 m/s, the peak gradient is 17 mmHg, and the mean gradient is 9 mmHg. The aortic valve area is 2.92 cmÂ  with a dimensionless index of 0.65. The stroke volume index is 61.2 ml/mÂ .    4. The mitral valve is sclerotic, trace mitral regurgitation.    5. The ascending aorta is normal for age/sex/bsa with a maximal diameter of 3.9 cm   EKG 12-lead 8/26/2024: Sinus rhythm   Exercise stress echocardiogram 7/29/2021  The patient exercised 9:31 mm:ss on modified Kevyn protocol.  Exercise was stopped due to fatigue.  There was a normal BP response to exercise.  Normal resting wall motion and no stress-induced wall motion abnormality  Coronary angiogram 11/17/2015:  Fractional flow reserve assessment of posterior descending branch  of right coronary and posterior lateral branch of right coronary: In  (0.91) and (0.94)        Assessment & Plan:   Chronic stable " angina  Recent echocardiogram 7/15/2024 shows LVEF 60-65% with no regional wall motion abnormalities or significant valvular disease, results were reviewed with Willie and his wife  12-lead EKG today shows sinus rhythm with no concerning changes  Clinically no anginal symptoms  Continue aspirin, rosuvastatin  PRN nitroglycerin  Hypertension  Well controlled off hydrochlorothiazide. Currently on lasix 20 mg daily  BP recheck today 122/64  I asked Willie to check his blood pressure 2-3 times over the next week and let us know if it is > 130/80  Chronic kidney disease, stage IIIb  8/23/2024: Cr 1.38, GFR 52, K 3.8- stable  Lower extremity swelling  Reports 2-3 years of bilateral lower extremity swelling. Denies shortness of breath, orthopnea, or PND.   Started on lasix by PCP with modest improvement in swelling  Compression stockings  Lymphedema referral placed  Hyperlipidemia  Rosuvastatin 40 mg  Lipid panel 8/23/2024: Cholesterol 116, TG 79, HDL 50, LDL 50  ALT 8/23/2024: 10  Chronic lymphocytic leukemia   Follows with hem/onc    Changes today:  - Lymphedema referral  - Check blood pressure 2-3 times over next week and let us know if > 130/80  - Follow up with Dr. Mata on 12/3/2024    Thank you for the opportunity to participate in this pleasant patient's care.    We would be happy to see this patient sooner for any concerns in the meantime.    YENI Baer, CNP   Nurse Practitioner  [facility]      Today's clinic visit entailed:  A total of 40 minutes was spent with patient, on chart review and documentation today.       Orders Placed This Encounter   Procedures    Lymphedema Therapy  Referral    EKG 12-lead complete w/read - Clinics (performed today)    EKG 12-lead complete w/read - Clinics (performed today)     Orders Placed This Encounter   Medications    potassium chloride maricel ER (KLOR-CON M10) 10 MEQ CR tablet     Sig: Take 1 tablet by mouth daily at 2 pm.    furosemide (LASIX)  20 MG tablet     Sig: Take 20 mg by mouth every morning.    oxyBUTYnin ER (DITROPAN XL) 10 MG 24 hr tablet     Sig: Take 10 mg by mouth daily.    tamsulosin (FLOMAX) 0.4 MG capsule     Sig: TAKE 1 CAPSULE BY MOUTH ONCE DAILY AFTER A MEAL     There are no discontinued medications.      Encounter Diagnoses   Name Primary?    Coronary artery disease involving native coronary artery of native heart without angina pectoris Yes    Bilateral lower extremity edema        CURRENT MEDICATIONS:  Current Outpatient Medications   Medication Sig Dispense Refill    ACETAMINOPHEN PO Take 600 mg by mouth 2 times daily      aspirin 81 MG tablet Take by mouth daily      Cholecalciferol (VITAMIN D3) 25 MCG (1000 UT) CAPS       co-enzyme Q-10 100 MG CAPS capsule Take 100 mg by mouth daily      cyanocobalamin (VITAMIN B-12) 500 MCG tablet Take 500 mcg by mouth daily      diphenhydrAMINE (BENADRYL) 25 MG tablet Take 25 mg by mouth at bedtime 14 tablet     fluticasone (FLONASE) 50 MCG/ACT spray Spray 1-2 sprays into both nostrils daily 3 Bottle 3    furosemide (LASIX) 20 MG tablet Take 20 mg by mouth every morning.      gabapentin (NEURONTIN) 300 MG capsule Take 1 capsule (300 mg) by mouth daily      ibuprofen (ADVIL/MOTRIN) 200 MG capsule Take 200 mg by mouth 2 times daily      ipratropium (ATROVENT) 0.03 % nasal spray Spray 2 sprays in nostril      nitroGLYcerin (NITROSTAT) 0.4 MG sublingual tablet Place 1 tablet (0.4 mg) under the tongue every 5 minutes as needed 20 tablet 0    oxyBUTYnin ER (DITROPAN XL) 10 MG 24 hr tablet Take 10 mg by mouth daily.      potassium chloride maricel ER (KLOR-CON M10) 10 MEQ CR tablet Take 1 tablet by mouth daily at 2 pm.      rosuvastatin (CRESTOR) 40 MG tablet Take 1 tablet (40 mg) by mouth daily 90 tablet 4    tamsulosin (FLOMAX) 0.4 MG capsule TAKE 1 CAPSULE BY MOUTH ONCE DAILY AFTER A MEAL      TURMERIC PO Take 1,400 mg by mouth 2 times daily      UNABLE TO FIND Take 1 tablet by mouth daily Neuriva       UNABLE TO FIND Take 1 tablet by mouth at bedtime Relaxium Sleep      UNABLE TO FIND Take 1 tablet by mouth 2 times daily Super Beta Prostate      Zinc Sulfate (ZINC 15 PO) Take 50 mg by mouth daily Dose unknown      hydrochlorothiazide (HYDRODIURIL) 25 MG tablet Take 1 tablet (25 mg) by mouth daily (Patient not taking: Reported on 8/26/2024) 90 tablet 4    montelukast (SINGULAIR) 10 MG tablet Take 10 mg by mouth At Bedtime (Patient not taking: Reported on 1/12/2024)      potassium chloride ER (K-TAB) 20 MEQ CR tablet Take 1 tablet (20 mEq) by mouth daily (Patient not taking: Reported on 8/26/2024) 90 tablet 3    traZODone (DESYREL) 50 MG tablet Take 1 tablet (50 mg) by mouth At Bedtime (Patient not taking: Reported on 8/26/2024) 30 tablet 0       ALLERGIES     Allergies   Allergen Reactions    Amoxicillin      severe rash    Contrast Dye Hives     Patient states this was years ago and he believes he has had dye since that time without problems.    Imdur [Isosorbide Nitrate] Other (See Comments)     headache    Lopressor [Metoprolol] Fatigue       PAST MEDICAL HISTORY:  Past Medical History:   Diagnosis Date    CAD (coronary artery disease)     cardiac cath 2015: non-obstructive diffuse CAD     Chest pain     chronic stable    Chronic stable angina (H24)     Contact dermatitis and other eczema, due to unspecified cause     DEPRESSIVE DISORDER NEC     Dyslipidemia     Fam hx-cardiovas dis NEC     Mother and Father    Hernia, abdominal     HTN (hypertension)     IRRITABLE COLON     Mild aortic stenosis     Mumps     Personal history of urinary calculi     Polyneuropathy     Skin cancer, basal cell 2008    SOB (shortness of breath)     Varicose veins        PAST SURGICAL HISTORY:  Past Surgical History:   Procedure Laterality Date    ANGIOGRAM  11/17/2015    Med Tx, no flow limiting lesions    COLONOSCOPY Left 5/29/2018    Procedure: COLONOSCOPY;  colonoscopy (fv)  ;  Surgeon: Nilesh Cervantes MD;  Location:  GI     "CYSTOSCOPY      EYE EXAM ESTABLISHED PT  2009    HC EXCISE VARICOCELE      \"cautery\" through intra venous approach    HC KNEE SCOPE, DIAGNOSTIC      Arthroscopy, Knee/left knee     HC REPAIR ING HERNIA,5+Y/O,REDUCIBL      Inguinal Hernia Repair  Right    TEST NOT FOUND      Per cut removal of L kidney stone    TESTICLE SURGERY  age 35    Varicocoele repair    VASECTOMY  age 30    ZZC URETER ENDOSCOPY THRU URETEROSTOMY REMV FB OR CALCULUS      dr de la garza    ZZHC COLONOSCOPY THRU STOMA, DIAGNOSTIC         FAMILY HISTORY:  Family History   Problem Relation Age of Onset    Heart Disease Mother         91    Cancer - colorectal Mother     Heart Disease Father          70s    Diabetes Maternal Aunt     Alzheimer Disease No family hx of     Cancer No family hx of     Prostate Cancer No family hx of        SOCIAL HISTORY:  Social History     Socioeconomic History    Marital status:      Spouse name: Velia    Number of children: 2    Years of education: 18    Highest education level: None   Occupational History    Occupation: Consultant     Employer: SELF   Tobacco Use    Smoking status: Former     Types: Cigars     Quit date: 1975     Years since quittin.6    Smokeless tobacco: Never    Tobacco comments:     2-3 cigars   Substance and Sexual Activity    Alcohol use: Yes     Alcohol/week: 3.3 standard drinks of alcohol     Types: 4 Glasses of wine per week     Comment: 1-2 glass of wine daily    Drug use: No    Sexual activity: Yes     Partners: Female     Birth control/protection: Surgical     Comment: vas   Other Topics Concern     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, , [employer]     Blood Transfusions No    Caffeine Concern Yes     Comment: 2 cups per day    Occupational Exposure No    Hobby Hazards No    Sleep Concern No    Stress Concern No    Weight Concern No    Special Diet No    Exercise No    Seat Belt Yes    Self-Exams No     Social Determinants of Health "     Financial Resource Strain: Low Risk  (6/17/2024)    Received from [facility]    Financial Resource Strain     Difficulty of Paying Living Expenses: 3   Food Insecurity: No Food Insecurity (6/17/2024)    Received from [facility]    Food Insecurity     Worried About Running Out of Food in the Last Year: 1   Transportation Needs: No Transportation Needs (6/17/2024)    Received from [facility]    Transportation Needs     Lack of Transportation (Medical): 1   Social Connections: Socially Integrated (6/17/2024)    Received from [facility]    Social Connections     Frequency of Communication with Friends and Family: 0   Interpersonal Safety: Not At Risk (3/20/2024)    Received from [facility]     Do you feel UNSAFE in any of your personal relationships with your family members or any other acquaintances?: No   Housing Stability: Low Risk  (6/17/2024)    Received from [facility]    Housing Stability     Unable to Pay for Housing in the Last Year: 1       Review of Systems:  Skin:        Eyes:       ENT:       Respiratory:       Cardiovascular:       Gastroenterology:      Genitourinary:       Musculoskeletal:       Neurologic:       Psychiatric:       Heme/Lymph/Imm:       Endocrine:         Physical Exam:    Vitals: BP (!) 144/69 (BP Location: Left arm, Patient Position: Sitting)   Pulse 64   Ht 1.829 m (6')   Wt 86.4 kg (190 lb 8 oz)   SpO2 99%   BMI 25.84 kg/m    Constitutional: Well nourished and in no apparent distress.  Eyes: Pupils equal, round. Sclerae anicteric.   HEENT: Normocephalic, atraumatic.   Neck: Supple. JVD no present. No carotid bruits.  Respiratory: Breathing non-labored. Lungs clear to auscultation bilaterally. No crackles, wheezes, rhonchi, or rales.  Cardiovascular:   Regular rate and rhythm, normal S1 and S2. +2/6 systolic murmur heard best over 2nd intercostal right sternal border. No rub, or gallop.  Skin: Warm, dry.   Extremities: 1-2+ lower extremity edema.  Neurologic: No gross motor deficits. Alert, awake, and oriented to person, place and time.  Psychiatric: Affect appropriate.        Recent Lab Results:  LIPID RESULTS:  Lab Results   Component Value Date    CHOL 116 08/23/2024    CHOL 136 12/27/2021    HDL 50 08/23/2024    HDL 56 12/27/2021    LDL 50 08/23/2024    LDL 60 12/27/2021    LDL 53 06/10/2019    TRIG 79 08/23/2024    TRIG 102 12/27/2021    CHOLHDLRATIO 2 12/27/2021       LIVER ENZYME RESULTS:  Lab Results   Component Value Date    AST 20 05/09/2017    ALT 10 08/23/2024    ALT <5 (L) 06/10/2019       CBC RESULTS:  Lab Results   Component Value Date    WBC 16.2 (A) 01/24/2022    RBC 4.27 (A) 01/24/2022    HGB 14.6 01/24/2022    HCT 43.7 01/24/2022    .3 (A) 01/24/2022    MCH 34.2 (A) 01/24/2022    MCHC 33.4 01/24/2022    RDW 12.8 11/17/2015     01/24/2022     11/17/2015       BMP RESULTS:  Lab Results   Component Value Date     08/23/2024    .1 12/27/2021    POTASSIUM 3.8 08/23/2024    POTASSIUM 3.30 (A) 12/27/2021    CHLORIDE 104 08/23/2024    CHLORIDE 100.8 12/27/2021    CO2 24 08/23/2024    CO2 30.7 12/27/2021    ANIONGAP 13 08/23/2024    ANIONGAP 6 07/21/2021    ANIONGAP Not Calculated 06/10/2019     (H) 08/23/2024     (A) 12/27/2021    BUN 14.0 08/23/2024    BUN 16 12/27/2021    BUN 11.8 12/27/2021    CR 1.38 (H) 08/23/2024    CR 1.30 12/27/2021    GFRESTIMATED 52 (L) 08/23/2024    GFRESTIMATED 58 08/20/2020    GFRESTBLACK 53 (L) 06/10/2019    GUANACO 9.7 08/23/2024    GUANACO 10.3 12/27/2021        A1C RESULTS:  Lab Results   Component Value Date    A1C 5.3 07/21/2021    A1C 5.6 05/04/2018       INR RESULTS:  Lab Results   Component Value Date    INR 1.05 11/11/2015           CC  No referring provider defined for this  encounter.

## 2024-08-23 ENCOUNTER — LAB (OUTPATIENT)
Dept: LAB | Facility: CLINIC | Age: 79
End: 2024-08-23
Payer: COMMERCIAL

## 2024-08-23 DIAGNOSIS — I25.10 CORONARY ARTERY DISEASE INVOLVING NATIVE CORONARY ARTERY OF NATIVE HEART WITHOUT ANGINA PECTORIS: ICD-10-CM

## 2024-08-23 LAB
ALT SERPL W P-5'-P-CCNC: 10 U/L (ref 0–70)
ANION GAP SERPL CALCULATED.3IONS-SCNC: 13 MMOL/L (ref 7–15)
BUN SERPL-MCNC: 14 MG/DL (ref 8–23)
CALCIUM SERPL-MCNC: 9.7 MG/DL (ref 8.8–10.4)
CHLORIDE SERPL-SCNC: 104 MMOL/L (ref 98–107)
CHOLEST SERPL-MCNC: 116 MG/DL
CREAT SERPL-MCNC: 1.38 MG/DL (ref 0.67–1.17)
EGFRCR SERPLBLD CKD-EPI 2021: 52 ML/MIN/1.73M2
FASTING STATUS PATIENT QL REPORTED: YES
GLUCOSE SERPL-MCNC: 114 MG/DL (ref 70–99)
HCO3 SERPL-SCNC: 24 MMOL/L (ref 22–29)
HDLC SERPL-MCNC: 50 MG/DL
LDLC SERPL CALC-MCNC: 50 MG/DL
NONHDLC SERPL-MCNC: 66 MG/DL
POTASSIUM SERPL-SCNC: 3.8 MMOL/L (ref 3.4–5.3)
SODIUM SERPL-SCNC: 141 MMOL/L (ref 135–145)
TRIGL SERPL-MCNC: 79 MG/DL

## 2024-08-23 PROCEDURE — 36415 COLL VENOUS BLD VENIPUNCTURE: CPT | Performed by: INTERNAL MEDICINE

## 2024-08-23 PROCEDURE — 80061 LIPID PANEL: CPT | Performed by: INTERNAL MEDICINE

## 2024-08-23 PROCEDURE — 80048 BASIC METABOLIC PNL TOTAL CA: CPT | Performed by: INTERNAL MEDICINE

## 2024-08-23 PROCEDURE — 84460 ALANINE AMINO (ALT) (SGPT): CPT | Performed by: INTERNAL MEDICINE

## 2024-08-26 ENCOUNTER — OFFICE VISIT (OUTPATIENT)
Dept: CARDIOLOGY | Facility: CLINIC | Age: 79
End: 2024-08-26
Payer: COMMERCIAL

## 2024-08-26 VITALS
DIASTOLIC BLOOD PRESSURE: 69 MMHG | HEIGHT: 72 IN | BODY MASS INDEX: 25.8 KG/M2 | SYSTOLIC BLOOD PRESSURE: 144 MMHG | WEIGHT: 190.5 LBS | HEART RATE: 64 BPM | OXYGEN SATURATION: 99 %

## 2024-08-26 DIAGNOSIS — R60.0 BILATERAL LOWER EXTREMITY EDEMA: ICD-10-CM

## 2024-08-26 DIAGNOSIS — I25.10 CORONARY ARTERY DISEASE INVOLVING NATIVE CORONARY ARTERY OF NATIVE HEART WITHOUT ANGINA PECTORIS: Primary | ICD-10-CM

## 2024-08-26 PROCEDURE — 93000 ELECTROCARDIOGRAM COMPLETE: CPT

## 2024-08-26 PROCEDURE — 99214 OFFICE O/P EST MOD 30 MIN: CPT

## 2024-08-26 RX ORDER — FUROSEMIDE 20 MG
20 TABLET ORAL EVERY MORNING
COMMUNITY

## 2024-08-26 RX ORDER — OXYBUTYNIN CHLORIDE 10 MG/1
10 TABLET, EXTENDED RELEASE ORAL DAILY
COMMUNITY

## 2024-08-26 RX ORDER — TAMSULOSIN HYDROCHLORIDE 0.4 MG/1
CAPSULE ORAL
COMMUNITY

## 2024-08-26 RX ORDER — POTASSIUM CHLORIDE 750 MG/1
1 TABLET, EXTENDED RELEASE ORAL
COMMUNITY
Start: 2024-07-19

## 2024-08-26 NOTE — PATIENT INSTRUCTIONS
Thank you for your visit with the [facility] today.    Today's Summary:    Lymphedema referral  Check blood pressure at home a couple of times over the next week. Let us know if its running > 130/80  No changes to medications today    Follow-up:  Cardiology follow up at [facility]: Keep your appointment with Dr. Mata on December 3rd, we'd be happy to see you sooner if any new concerns arise.     Cardiology Scheduling ~[phone]  Cardiology Clinic RN ~ [phone]    It was a pleasure seeing you today.     YENI Baer, CNP  Certified Nurse Practitioner  [facility]  August 26, 2024  ________________________________________________________

## 2024-08-26 NOTE — LETTER
8/26/2024    Fuentes Carr DO  [address]  [place] [address]    RE: Nilesh Dos Santos       Dear Colleague,     I had the pleasure of seeing Nilesh Dos Santos in the [facility].          ~Cardiology Clinic Visit~    Primary Cardiologist: Dr. Mata  Reason for visit: Discuss results of recent echocardiogram      Nilesh Dos Santos MRN# [id number]   YOB: 1945 Age: 79 year old       HPI:    Nilesh Dos Santos is a delightful 79 year old patient with past medical history significant for:    Chronic stable angina  Coronary CTA in 2013 showed diffuse coronary artery calcification. Coronary angiogram 2015 showed moderate stenosis involving the rPDA and posterolateral branch with FFR 0.9 and 0.94.  Hypertension  Chronic kidney disease stage IIIa  Hyperlipidemia  Lower extremity edema  Chronic lymphocytic leukemia     Nilesh Dos Santos is patient of Dr. Mata and was last seen on January 12, 2024 for routine follow-up. During that visit he was doing well from a cardiac standpoint and free of chest discomfort, shortness of breath, syncope, or dizziness. At that time he was struggling with progressive worsening arthritis pain. He subsequently underwent left total knee arthroplasty in 3/2024.    Since that time, he was seen by his PCP for bilateral lower extremity swelling. He was started on furosemide 20 mg daily and advised to follow low sodium diet, elevate legs, and use compression stockings. On follow-up he continued to have swelling despite good urine response and increasing his lasix to 40 mg. Lymphedema consult was recommended.    Nilesh also follows with hem/onc for management of his chronic lymphocytic leukemia diagnosed around May this year. He was last seen on 8/2/2024 with plan for continued observation and monitoring of labs.    Today, he is here to discuss recent results of echocardiogram as well as concerns over fatigue and pain in his lower extremities. He was told at a recent  "office visit that he had a murmur, he recalls being told this as a child too however after 6 th grade it was no longer an issue. He denies chest pain, pressure, or shortness of breath. He does note some chest tightness, like a \"sore muscle\" that occurs at night when lying flat and worse when eating food. These symptoms do not occur with exertion and are not associated with any other symptoms.     In regards to his fatigue and bilateral lower extremity pain/swelling this has been ongoing for about 2-3 years. He notes that it has become worse since his knee surgery in March. He notices the pain in his legs mostly at night and feels this is contributing to his overall fatigue. He does where compression stockings which do help. He denies orthopnea or PND.    Initial blood pressure today was 144/36, recheck with manual cuff was 122/64. He has been off his hydrochlorothiazide since starting lasix this spring.     Diagnotic studies:  Echocardiogram 7/15/2024 (care everywhere):  1. Normal LV size, normal wall thickness, normal global systolic function with an estimated EF of 60 - 65%.    2. Left ventricular sigmoid basal hypertrophy no outflow obstruction.    3. The aortic valve is trileaflet and sclerotic, no stenosis and no regurgitation. The aortic valve peak velocity is 2.0 m/s, the peak gradient is 17 mmHg, and the mean gradient is 9 mmHg. The aortic valve area is 2.92 cmÂ  with a dimensionless index of 0.65. The stroke volume index is 61.2 ml/mÂ .    4. The mitral valve is sclerotic, trace mitral regurgitation.    5. The ascending aorta is normal for age/sex/bsa with a maximal diameter of 3.9 cm   EKG 12-lead 8/26/2024: Sinus rhythm   Exercise stress echocardiogram 7/29/2021  The patient exercised 9:31 mm:ss on modified Kevyn protocol.  Exercise was stopped due to fatigue.  There was a normal BP response to exercise.  Normal resting wall motion and no stress-induced wall motion abnormality  Coronary angiogram " 11/17/2015:  Fractional flow reserve assessment of posterior descending branch  of right coronary and posterior lateral branch of right coronary: In  (0.91) and (0.94)        Assessment & Plan:   Chronic stable angina  Recent echocardiogram 7/15/2024 shows LVEF 60-65% with no regional wall motion abnormalities or significant valvular disease, results were reviewed with Willie and his wife  12-lead EKG today shows sinus rhythm with no concerning changes  Clinically no anginal symptoms  Continue aspirin, rosuvastatin  PRN nitroglycerin  Hypertension  Well controlled off hydrochlorothiazide. Currently on lasix 20 mg daily  BP recheck today 122/64  I asked Willie to check his blood pressure 2-3 times over the next week and let us know if it is > 130/80  Chronic kidney disease, stage IIIb  8/23/2024: Cr 1.38, GFR 52, K 3.8- stable  Lower extremity swelling  Reports 2-3 years of bilateral lower extremity swelling. Denies shortness of breath, orthopnea, or PND.   Started on lasix by PCP with modest improvement in swelling  Compression stockings  Lymphedema referral placed  Hyperlipidemia  Rosuvastatin 40 mg  Lipid panel 8/23/2024: Cholesterol 116, TG 79, HDL 50, LDL 50  ALT 8/23/2024: 10  Chronic lymphocytic leukemia   Follows with hem/onc    Changes today:  - Lymphedema referral  - Check blood pressure 2-3 times over next week and let us know if > 130/80  - Follow up with Dr. Mata on 12/3/2024    Thank you for the opportunity to participate in this pleasant patient's care.    We would be happy to see this patient sooner for any concerns in the meantime.    YENI Baer, CNP   Nurse Practitioner  [facility]      Today's clinic visit entailed:  A total of 40 minutes was spent with patient, on chart review and documentation today.       Orders Placed This Encounter   Procedures     Lymphedema Therapy  Referral     EKG 12-lead complete w/read - Clinics (performed today)     EKG 12-lead complete  w/read - Clinics (performed today)     Orders Placed This Encounter   Medications     potassium chloride maricel ER (KLOR-CON M10) 10 MEQ CR tablet     Sig: Take 1 tablet by mouth daily at 2 pm.     furosemide (LASIX) 20 MG tablet     Sig: Take 20 mg by mouth every morning.     oxyBUTYnin ER (DITROPAN XL) 10 MG 24 hr tablet     Sig: Take 10 mg by mouth daily.     tamsulosin (FLOMAX) 0.4 MG capsule     Sig: TAKE 1 CAPSULE BY MOUTH ONCE DAILY AFTER A MEAL     There are no discontinued medications.      Encounter Diagnoses   Name Primary?     Coronary artery disease involving native coronary artery of native heart without angina pectoris Yes     Bilateral lower extremity edema        CURRENT MEDICATIONS:  Current Outpatient Medications   Medication Sig Dispense Refill     ACETAMINOPHEN PO Take 600 mg by mouth 2 times daily       aspirin 81 MG tablet Take by mouth daily       Cholecalciferol (VITAMIN D3) 25 MCG (1000 UT) CAPS        co-enzyme Q-10 100 MG CAPS capsule Take 100 mg by mouth daily       cyanocobalamin (VITAMIN B-12) 500 MCG tablet Take 500 mcg by mouth daily       diphenhydrAMINE (BENADRYL) 25 MG tablet Take 25 mg by mouth at bedtime 14 tablet      fluticasone (FLONASE) 50 MCG/ACT spray Spray 1-2 sprays into both nostrils daily 3 Bottle 3     furosemide (LASIX) 20 MG tablet Take 20 mg by mouth every morning.       gabapentin (NEURONTIN) 300 MG capsule Take 1 capsule (300 mg) by mouth daily       ibuprofen (ADVIL/MOTRIN) 200 MG capsule Take 200 mg by mouth 2 times daily       ipratropium (ATROVENT) 0.03 % nasal spray Spray 2 sprays in nostril       nitroGLYcerin (NITROSTAT) 0.4 MG sublingual tablet Place 1 tablet (0.4 mg) under the tongue every 5 minutes as needed 20 tablet 0     oxyBUTYnin ER (DITROPAN XL) 10 MG 24 hr tablet Take 10 mg by mouth daily.       potassium chloride maricel ER (KLOR-CON M10) 10 MEQ CR tablet Take 1 tablet by mouth daily at 2 pm.       rosuvastatin (CRESTOR) 40 MG tablet Take 1 tablet  (40 mg) by mouth daily 90 tablet 4     tamsulosin (FLOMAX) 0.4 MG capsule TAKE 1 CAPSULE BY MOUTH ONCE DAILY AFTER A MEAL       TURMERIC PO Take 1,400 mg by mouth 2 times daily       UNABLE TO FIND Take 1 tablet by mouth daily Neuriva       UNABLE TO FIND Take 1 tablet by mouth at bedtime Relaxium Sleep       UNABLE TO FIND Take 1 tablet by mouth 2 times daily Super Beta Prostate       Zinc Sulfate (ZINC 15 PO) Take 50 mg by mouth daily Dose unknown       hydrochlorothiazide (HYDRODIURIL) 25 MG tablet Take 1 tablet (25 mg) by mouth daily (Patient not taking: Reported on 8/26/2024) 90 tablet 4     montelukast (SINGULAIR) 10 MG tablet Take 10 mg by mouth At Bedtime (Patient not taking: Reported on 1/12/2024)       potassium chloride ER (K-TAB) 20 MEQ CR tablet Take 1 tablet (20 mEq) by mouth daily (Patient not taking: Reported on 8/26/2024) 90 tablet 3     traZODone (DESYREL) 50 MG tablet Take 1 tablet (50 mg) by mouth At Bedtime (Patient not taking: Reported on 8/26/2024) 30 tablet 0       ALLERGIES     Allergies   Allergen Reactions     Amoxicillin      severe rash     Contrast Dye Hives     Patient states this was years ago and he believes he has had dye since that time without problems.     Imdur [Isosorbide Nitrate] Other (See Comments)     headache     Lopressor [Metoprolol] Fatigue       PAST MEDICAL HISTORY:  Past Medical History:   Diagnosis Date     CAD (coronary artery disease)     cardiac cath 2015: non-obstructive diffuse CAD      Chest pain     chronic stable     Chronic stable angina (H24)      Contact dermatitis and other eczema, due to unspecified cause      DEPRESSIVE DISORDER NEC      Dyslipidemia      Fam hx-cardiovas dis NEC     Mother and Father     Hernia, abdominal      HTN (hypertension)      IRRITABLE COLON      Mild aortic stenosis      Mumps      Personal history of urinary calculi      Polyneuropathy      Skin cancer, basal cell 2008     SOB (shortness of breath)      Varicose veins   "      PAST SURGICAL HISTORY:  Past Surgical History:   Procedure Laterality Date     ANGIOGRAM  2015    Med Tx, no flow limiting lesions     COLONOSCOPY Left 2018    Procedure: COLONOSCOPY;  colonoscopy (fv)  ;  Surgeon: Nilesh Cervantes MD;  Location:  GI     CYSTOSCOPY       EYE EXAM ESTABLISHED PT       HC EXCISE VARICOCELE      \"cautery\" through intra venous approach     HC KNEE SCOPE, DIAGNOSTIC      Arthroscopy, Knee/left knee      HC REPAIR ING HERNIA,5+Y/O,REDUCIBL      Inguinal Hernia Repair  Right     TEST NOT FOUND      Per cut removal of L kidney stone     TESTICLE SURGERY  age 35    Varicocoele repair     VASECTOMY  age 30     ZZC URETER ENDOSCOPY THRU URETEROSTOMY REMV FB OR CALCULUS      dr de la garza     ZZHC COLONOSCOPY THRU STOMA, DIAGNOSTIC         FAMILY HISTORY:  Family History   Problem Relation Age of Onset     Heart Disease Mother         91     Cancer - colorectal Mother      Heart Disease Father          70s     Diabetes Maternal Aunt      Alzheimer Disease No family hx of      Cancer No family hx of      Prostate Cancer No family hx of        SOCIAL HISTORY:  Social History     Socioeconomic History     Marital status:      Spouse name: Velia     Number of children: 2     Years of education: 18     Highest education level: None   Occupational History     Occupation: Consultant     Employer: SELF   Tobacco Use     Smoking status: Former     Types: Cigars     Quit date: 1975     Years since quittin.6     Smokeless tobacco: Never     Tobacco comments:     2-3 cigars   Substance and Sexual Activity     Alcohol use: Yes     Alcohol/week: 3.3 standard drinks of alcohol     Types: 4 Glasses of wine per week     Comment: 1-2 glass of wine daily     Drug use: No     Sexual activity: Yes     Partners: Female     Birth control/protection: Surgical     Comment: vas   Other Topics Concern      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, language " teacher, [employer]      Blood Transfusions No     Caffeine Concern Yes     Comment: 2 cups per day     Occupational Exposure No     Hobby Hazards No     Sleep Concern No     Stress Concern No     Weight Concern No     Special Diet No     Exercise No     Seat Belt Yes     Self-Exams No     Social Determinants of Health     Financial Resource Strain: Low Risk  (6/17/2024)    Received from [facility]    Financial Resource Strain      Difficulty of Paying Living Expenses: 3   Food Insecurity: No Food Insecurity (6/17/2024)    Received from [facility]    Food Insecurity      Worried About Running Out of Food in the Last Year: 1   Transportation Needs: No Transportation Needs (6/17/2024)    Received from [facility]    Transportation Needs      Lack of Transportation (Medical): 1   Social Connections: Socially Integrated (6/17/2024)    Received from [facility]    Social Connections      Frequency of Communication with Friends and Family: 0   Interpersonal Safety: Not At Risk (3/20/2024)    Received from [facility] IP Custom IPV      Do you feel UNSAFE in any of your personal relationships with your family members or any other acquaintances?: No   Housing Stability: Low Risk  (6/17/2024)    Received from [employer] Pending sale to [facility]    Housing Stability      Unable to Pay for Housing in the Last Year: 1       Review of Systems:  Skin:        Eyes:       ENT:       Respiratory:       Cardiovascular:       Gastroenterology:      Genitourinary:       Musculoskeletal:       Neurologic:       Psychiatric:       Heme/Lymph/Imm:       Endocrine:         Physical Exam:    Vitals: BP (!) 144/69 (BP Location: Left arm, Patient Position: Sitting)   Pulse 64   Ht 1.829 m (6')   Wt 86.4 kg (190 lb 8 oz)   SpO2 99%   BMI 25.84 kg/m     Constitutional: Well nourished and in no apparent distress.  Eyes: Pupils equal, round. Sclerae anicteric.   HEENT: Normocephalic, atraumatic.   Neck: Supple. JVD no present. No carotid bruits.  Respiratory: Breathing non-labored. Lungs clear to auscultation bilaterally. No crackles, wheezes, rhonchi, or rales.  Cardiovascular:  Regular rate and rhythm, normal S1 and S2. +2/6 systolic murmur heard best over 2nd intercostal right sternal border. No rub, or gallop.  Skin: Warm, dry.   Extremities: 1-2+ lower extremity edema.  Neurologic: No gross motor deficits. Alert, awake, and oriented to person, place and time.  Psychiatric: Affect appropriate.        Recent Lab Results:  LIPID RESULTS:  Lab Results   Component Value Date    CHOL 116 08/23/2024    CHOL 136 12/27/2021    HDL 50 08/23/2024    HDL 56 12/27/2021    LDL 50 08/23/2024    LDL 60 12/27/2021    LDL 53 06/10/2019    TRIG 79 08/23/2024    TRIG 102 12/27/2021    CHOLHDLRATIO 2 12/27/2021       LIVER ENZYME RESULTS:  Lab Results   Component Value Date    AST 20 05/09/2017    ALT 10 08/23/2024    ALT <5 (L) 06/10/2019       CBC RESULTS:  Lab Results   Component Value Date    WBC 16.2 (A) 01/24/2022    RBC 4.27 (A) 01/24/2022    HGB 14.6 01/24/2022    HCT 43.7 01/24/2022    .3 (A) 01/24/2022    MCH 34.2 (A) 01/24/2022    MCHC 33.4 01/24/2022    RDW 12.8 11/17/2015     01/24/2022     11/17/2015       BMP RESULTS:  Lab Results   Component Value Date     08/23/2024    .1 12/27/2021    POTASSIUM 3.8 08/23/2024    POTASSIUM 3.30 (A) 12/27/2021    CHLORIDE 104 08/23/2024    CHLORIDE 100.8 12/27/2021    CO2 24 08/23/2024    CO2 30.7 12/27/2021    ANIONGAP 13 08/23/2024    ANIONGAP 6 07/21/2021    ANIONGAP Not Calculated 06/10/2019     (H) 08/23/2024     (A) 12/27/2021    BUN 14.0 08/23/2024    BUN 16 12/27/2021    BUN 11.8 12/27/2021    CR 1.38 (H) 08/23/2024    CR 1.30 12/27/2021    GFRESTIMATED 52 (L) 08/23/2024     GFRESTIMATED 58 08/20/2020    GFRESTBLACK 53 (L) 06/10/2019    GUANACO 9.7 08/23/2024    GUANACO 10.3 12/27/2021        A1C RESULTS:  Lab Results   Component Value Date    A1C 5.3 07/21/2021    A1C 5.6 05/04/2018       INR RESULTS:  Lab Results   Component Value Date    INR 1.05 11/11/2015           CC  No referring provider defined for this encounter.      Thank you for allowing me to participate in the care of your patient.      Sincerely,     YENI Baer [facility]  cc:   Fuentes Carr DO  [address] [address] [place] [address]

## 2024-08-29 ENCOUNTER — THERAPY VISIT (OUTPATIENT)
Dept: OCCUPATIONAL THERAPY | Facility: CLINIC | Age: 79
End: 2024-08-29
Payer: COMMERCIAL

## 2024-08-29 DIAGNOSIS — R60.0 BILATERAL LOWER EXTREMITY EDEMA: ICD-10-CM

## 2024-08-29 DIAGNOSIS — I89.0 LYMPHEDEMA: Primary | ICD-10-CM

## 2024-08-29 PROCEDURE — 97165 OT EVAL LOW COMPLEX 30 MIN: CPT | Mod: GO | Performed by: OCCUPATIONAL THERAPIST

## 2024-08-29 PROCEDURE — 97140 MANUAL THERAPY 1/> REGIONS: CPT | Mod: GO | Performed by: OCCUPATIONAL THERAPIST

## 2024-08-29 NOTE — PROGRESS NOTES
OCCUPATIONAL THERAPY EVALUATION  Type of Visit: Evaluation       Fall Risk Screen:  Fall screen completed by: OT  Have you fallen 2 or more times in the past year?: No  Have you fallen and had an injury in the past year?: No  Is patient a fall risk?: No    Subjective      Presenting condition or subjective complaint:    Date of onset: 08/26/24 (BLE swelling for years reported)    Relevant medical history:     Dates & types of surgery:      Prior diagnostic imaging/testing results:       Prior therapy history for the same diagnosis, illness or injury:        Prior Level of Function  Transfers: Assistive equipment  Ambulation: Assistive equipment  ADL: Independent, Assistive equipment, Assistive person  IADL:  did not discuss    Living Environment  Social support:     Type of home:     Stairs to enter the home:         Ramp:     Stairs inside the home:         Help at home:    Equipment owned:       Employment:      Hobbies/Interests:      Patient goals for therapy:      Pain assessment:  Pt does not report pain associated with BLE lymphedema     Objective       EDEMA EVALUATION  Additional history:  Body part affected by edema:    If cancer related, treatment:    If not cancer related, problems with veins or cause of swelling:    Distance able to walk:    Time able to stand:    Sensation problems in hands/feet:      Edema etiology: Unknown, possible venous involvemnt in BLE swelling; meds?    FUNCTIONAL SCALES  Lower Extremity Functional Scale (score out of 80). A lower score indicates greater impairment:    Shoulder Pain and Disability Index (score out of 100).  A higher score indicates greater impairment:      Cognitive Status Examination  Orientation: Oriented to person, place and time   Level of Consciousness: Alert  Follows Commands and Answers Questions: 100% of the time  Personal Safety and Judgement: Intact  Memory:  NT'd    EDEMA  Skin Condition: Dryness, Pitting  Scar: Yes  LTKA scar  Capillary Refill:  Symmetrical  Radial Pulse:  NT'd  Dorsal Pedal Pulse: Symmetrical  Stemmer Sign: -  Ulceration: No    GIRTH MEASUREMENTS: Refer to separate girth measurement flowsheet.     VOLUME UE  Right UE (mL)    Left UE (mL)    UE Volume Comparison BUE vol approx same   % Difference    VOLUME LE  Right LE (mL)    Left LE (mL)    LE Volume Comparison BLE vol approx same   % Difference    Head/Neck Volume     Trunk Volume    Genital Volume       RANGE OF MOTION: LE ROM WFL  UE ROM WFL  STRENGTH: UE Strength WFL, LE Strength WFL  POSTURE:   PALPATION:   ACTIVITIES OF DAILY LIVING: Ind-Mod I-Cory  BED MOBILITY: WFL  TRANSFERS: WFL  GAIT/LOCOMOTION: Mod Ind  BALANCE: WFL  SENSATION:  BLE neuropathy reported  VASCULAR:  no known issues; s/s venous involvement in BLE swelling noted  COORDINATION: WFL  MUSCLE TONE: WFL    Assessment & Plan   CLINICAL IMPRESSIONS  Medical Diagnosis: lymphedema    Treatment Diagnosis: lymphedema    Impression/Assessment:  Pt presents with BLE lymphedema of unknown origin. Pt has not been through any cancer care so no suspicions related to damage to lymphatic system, mild cardiovascular background, and possible meds discussed as means to his BLE lymphedema. Ongoing recovery from LTKA also discussed as far as activity levels and bearing on LE swelling.    Clinical Decision Making (Complexity):  Assessment of Occupational Performance: 1-3 Performance Deficits  Occupational Performance Limitations: functional mobility and infection risk  Clinical Decision Making (Complexity): Low complexity    PLAN OF CARE  Treatment Interventions:  Interventions: Therapeutic Exercise, Manual Therapy    Long Term Goals   OT Goal 1  Goal Identifier: GCB Wearing  Goal Description: Pt will tolerate 23/24hrs wearing GCbs for reducitons in BLE lymphedema needed to reduce risk infections and promote more ease with mobility  Rationale: In order to maximize safety and independence with functional transfers and functional mobility  within the home or community  Target Date: 11/21/24  OT Goal 2  Goal Identifier: GCB Donning  Goal Description: Pt/family to demo Ind with donning GCBs for Ind building with home management BLE lymphedema needed to reduce risk infections and promote more ease with mobility  Rationale: In order to maximize safety and independence with functional transfers and functional mobility within the home or community  Target Date: 11/21/24  OT Goal 3  Goal Identifier: MLD/HEP  Goal Description: Demo Ind with self MLD and HEP for BLE lymphedema management needed to ease mobility tasks and reduce risk skin infections  Rationale: In order to maximize safety and independence with functional transfers and functional mobility within the home or community  Target Date: 11/21/24  OT Goal 4  Goal Identifier: Garments  Goal Description: Demo Ind with donning/doffing/care maintenance phase compression garments for Ind building with home management BLE lymphedema needed to improve mobility and reduce risk infections  Rationale: In order to maximize safety and independence with functional transfers and functional mobility within the home or community  Target Date: 11/21/24      Frequency of Treatment: 3x/wk  Duration of Treatment: 12 weeks with ability to taper as needed     Recommended Referrals to Other Professionals:  none at this time  Education Assessment: Learner/Method: Patient;Family;Listening     Risks and benefits of evaluation/treatment have been explained.   Patient/Family/caregiver agrees with Plan of Care.     Evaluation Time:    OT Eval, Low Complexity Minutes (61354): 20       Signing Clinician: Jered JONES [facility]                                                                                   OUTPATIENT OCCUPATIONAL THERAPY      PLAN OF TREATMENT FOR OUTPATIENT REHABILITATION   Patient's Last Name, First Name, Nilesh Jauregui YOB: 1945   Provider's Name    [facility]   Medical Record No.  [id number]     Onset Date: 08/26/24 (BLE swelling for years reported) Start of Care Date: 08/29/24     Medical Diagnosis:  lymphedema      OT Treatment Diagnosis:  lymphedema Plan of Treatment  Frequency/Duration:3x/wk/12 weeks with ability to taper as needed    Certification date from 08/29/24   To 11/21/24        See note for plan of treatment details and functional goals     Jered Valles                         I CERTIFY THE NEED FOR THESE SERVICES FURNISHED UNDER        THIS PLAN OF TREATMENT AND WHILE UNDER MY CARE     (Physician attestation of this document indicates review and certification of the therapy plan).              Referring Provider:  Mary Alice De La Rosa    Initial Assessment  See Epic Evaluation- 08/29/24

## 2024-09-10 ENCOUNTER — THERAPY VISIT (OUTPATIENT)
Dept: OCCUPATIONAL THERAPY | Facility: CLINIC | Age: 79
End: 2024-09-10
Payer: COMMERCIAL

## 2024-09-10 DIAGNOSIS — I89.0 LYMPHEDEMA: Primary | ICD-10-CM

## 2024-09-10 PROCEDURE — 97140 MANUAL THERAPY 1/> REGIONS: CPT | Mod: GO | Performed by: OCCUPATIONAL THERAPIST

## 2024-09-12 ENCOUNTER — THERAPY VISIT (OUTPATIENT)
Dept: OCCUPATIONAL THERAPY | Facility: CLINIC | Age: 79
End: 2024-09-12
Payer: COMMERCIAL

## 2024-09-12 DIAGNOSIS — I89.0 LYMPHEDEMA: Primary | ICD-10-CM

## 2024-09-12 PROCEDURE — 97140 MANUAL THERAPY 1/> REGIONS: CPT | Mod: GO | Performed by: OCCUPATIONAL THERAPIST

## 2024-09-13 ENCOUNTER — THERAPY VISIT (OUTPATIENT)
Dept: OCCUPATIONAL THERAPY | Facility: CLINIC | Age: 79
End: 2024-09-13
Payer: COMMERCIAL

## 2024-09-13 DIAGNOSIS — I89.0 LYMPHEDEMA: Primary | ICD-10-CM

## 2024-09-13 PROCEDURE — 97140 MANUAL THERAPY 1/> REGIONS: CPT | Mod: GO | Performed by: OCCUPATIONAL THERAPIST

## 2024-09-17 ENCOUNTER — THERAPY VISIT (OUTPATIENT)
Dept: OCCUPATIONAL THERAPY | Facility: CLINIC | Age: 79
End: 2024-09-17
Payer: COMMERCIAL

## 2024-09-17 DIAGNOSIS — I89.0 LYMPHEDEMA: Primary | ICD-10-CM

## 2024-09-17 PROCEDURE — 97140 MANUAL THERAPY 1/> REGIONS: CPT | Mod: GO | Performed by: OCCUPATIONAL THERAPIST

## 2024-09-19 ENCOUNTER — THERAPY VISIT (OUTPATIENT)
Dept: OCCUPATIONAL THERAPY | Facility: CLINIC | Age: 79
End: 2024-09-19
Payer: COMMERCIAL

## 2024-09-19 DIAGNOSIS — I89.0 LYMPHEDEMA: Primary | ICD-10-CM

## 2024-09-19 PROCEDURE — 97140 MANUAL THERAPY 1/> REGIONS: CPT | Mod: GO | Performed by: OCCUPATIONAL THERAPIST

## 2024-09-24 ENCOUNTER — THERAPY VISIT (OUTPATIENT)
Dept: OCCUPATIONAL THERAPY | Facility: CLINIC | Age: 79
End: 2024-09-24
Payer: COMMERCIAL

## 2024-09-24 DIAGNOSIS — I89.0 LYMPHEDEMA: Primary | ICD-10-CM

## 2024-09-24 PROCEDURE — 97140 MANUAL THERAPY 1/> REGIONS: CPT | Mod: GO | Performed by: OCCUPATIONAL THERAPY ASSISTANT

## 2024-10-08 ENCOUNTER — THERAPY VISIT (OUTPATIENT)
Dept: OCCUPATIONAL THERAPY | Facility: CLINIC | Age: 79
End: 2024-10-08
Payer: COMMERCIAL

## 2024-10-08 DIAGNOSIS — I89.0 LYMPHEDEMA: Primary | ICD-10-CM

## 2024-10-08 PROCEDURE — 97140 MANUAL THERAPY 1/> REGIONS: CPT | Mod: GO | Performed by: OCCUPATIONAL THERAPY ASSISTANT

## 2025-01-02 DIAGNOSIS — I10 ESSENTIAL HYPERTENSION, BENIGN: ICD-10-CM

## 2025-01-02 DIAGNOSIS — E78.2 MIXED HYPERLIPIDEMIA: ICD-10-CM

## 2025-01-02 RX ORDER — HYDROCHLOROTHIAZIDE 25 MG/1
25 TABLET ORAL DAILY
Qty: 90 TABLET | Refills: 2 | Status: SHIPPED | OUTPATIENT
Start: 2025-01-02

## 2025-01-02 RX ORDER — ROSUVASTATIN CALCIUM 40 MG/1
40 TABLET, COATED ORAL DAILY
Qty: 90 TABLET | Refills: 2 | Status: SHIPPED | OUTPATIENT
Start: 2025-01-02

## 2025-01-14 ENCOUNTER — OFFICE VISIT (OUTPATIENT)
Dept: CARDIOLOGY | Facility: CLINIC | Age: 80
End: 2025-01-14
Payer: COMMERCIAL

## 2025-01-14 VITALS
SYSTOLIC BLOOD PRESSURE: 115 MMHG | HEART RATE: 82 BPM | DIASTOLIC BLOOD PRESSURE: 75 MMHG | WEIGHT: 191.4 LBS | BODY MASS INDEX: 26.8 KG/M2 | HEIGHT: 71 IN

## 2025-01-14 DIAGNOSIS — I25.10 CORONARY ARTERY DISEASE INVOLVING NATIVE CORONARY ARTERY OF NATIVE HEART WITHOUT ANGINA PECTORIS: ICD-10-CM

## 2025-01-14 DIAGNOSIS — R42 DIZZINESS: Primary | ICD-10-CM

## 2025-01-14 RX ORDER — CELECOXIB 200 MG/1
200 CAPSULE ORAL 2 TIMES DAILY
COMMUNITY

## 2025-01-14 RX ORDER — HYDROCODONE/ACETAMINOPHEN 5 MG-500MG
TABLET ORAL
COMMUNITY

## 2025-01-14 RX ORDER — DOCUSATE SODIUM 100 MG/1
100 TABLET ORAL DAILY
COMMUNITY

## 2025-01-14 NOTE — PROGRESS NOTES
HPI and Plan:   Mr. Dos Santos is a very pleasant 79-year-old gentleman with history of chronic stable angina in the past, with coronary CT angiogram in 2013 showing diffuse coronary artery calcification and then subsequent coronary angiogram in 2015 when he was found to have moderate stenosis involving the RPDA and posterolateral branch with FFR of 0.9 and 0.94, respectively, indicating they were not flow-limiting lesions.  He also has history of hypertension, chronic kidney disease stage III.  Exercise stress echocardiogram in 2021 was negative for ischemia.  He has also been diagnosed with CLL.  Last year he struggled with some lower extremity edema which responded to Lasix and compression stockings through lymphedema clinic.  He also had an echocardiogram done in July at an outside facility that showed normal LV and RV systolic function and aortic valve sclerosis.  Today patient is coming for routine follow-up.  He is accompanied by his wife.  No chest discomfort with exertion, no shortness of breath.  He has noted some dizziness which happens almost daily this happens when he is standing.  Some of the symptoms suggest like a balance issue.  He has not passed out.  No palpitations.  LDL is well-controlled at 50.  He is on aspirin, Crestor 20 mg daily, Lasix 40 mg daily, potassium supplementation.  He will be seeing his oncologist soon.    Assessment and plan  CAD.  History of chronic stable angina.  Now no exertional symptoms.  Doing well on aspirin and high intensity statin  LDL well-controlled on high intensity statin  Hypertension controlled on Lasix  History of lower extremity edema which has responded to Lasix and compression stockings.  Clinically does not appear to be congestive heart failure  Dizziness does not sound arrhythmogenic in nature.  Will check EKG today as well as 48-hour Holter evaluation.  CLL    Recommendations  Continue aspirin, statin  EKG today, 48-hour Holter evaluation  If no significant  abnormalities noted on 48-hour Holter evaluation would recommend evaluation with PCP for dizziness which sounds more like a balance issue  Follow-up in a year with an echocardiogram, sooner if he notes any change in clinical status  Orders Placed This Encounter   Procedures    Follow-Up with Cardiology    EKG 12-lead complete w/read - Clinics    Holter Monitor 48 hour Adult Pediatric    Echocardiogram Complete       Orders Placed This Encounter   Medications    celecoxib (CELEBREX) 200 MG capsule     Sig: Take 200 mg by mouth 2 times daily.    Multiple Vitamin (MULTIVITAMIN ADULT PO)     Sig: Take by mouth.    UNABLE TO FIND     Sig: Vitamin D3-Zinc-B12    Turmeric (QC TUMERIC COMPLEX PO)     Sig: Take by mouth.    docusate sodium (STOOL SOFTENER) 100 MG tablet     Sig: Take 100 mg by mouth daily.    FLUTICASONE PROPIONATE, INHAL, IN     Sig: Inhale into the lungs daily as needed.    Glucosamine-Chondroit-MSM-C-Mn (FLEXI JOINT) TABS     Sig: Take by mouth.       Medications Discontinued During This Encounter   Medication Reason    hydrochlorothiazide (HYDRODIURIL) 25 MG tablet Stopped by Patient (No AVS)    potassium chloride maricel ER (KLOR-CON M10) 10 MEQ CR tablet Duplicate Therapy (No AVS / No eCancel)         Encounter Diagnoses   Name Primary?    Dizziness Yes    Coronary artery disease involving native coronary artery of native heart without angina pectoris        CURRENT MEDICATIONS:  Current Outpatient Medications   Medication Sig Dispense Refill    ACETAMINOPHEN PO Take 600 mg by mouth 2 times daily      aspirin 81 MG tablet Take by mouth daily      celecoxib (CELEBREX) 200 MG capsule Take 200 mg by mouth 2 times daily.      Cholecalciferol (VITAMIN D3) 25 MCG (1000 UT) CAPS       co-enzyme Q-10 100 MG CAPS capsule Take 100 mg by mouth daily      cyanocobalamin (VITAMIN B-12) 500 MCG tablet Take 500 mcg by mouth daily      diphenhydrAMINE (BENADRYL) 25 MG tablet Take 25 mg by mouth at bedtime 14 tablet      docusate sodium (STOOL SOFTENER) 100 MG tablet Take 100 mg by mouth daily.      fluticasone (FLONASE) 50 MCG/ACT spray Spray 1-2 sprays into both nostrils daily 3 Bottle 3    FLUTICASONE PROPIONATE, INHAL, IN Inhale into the lungs daily as needed.      furosemide (LASIX) 20 MG tablet Take 40 mg by mouth every morning.      gabapentin (NEURONTIN) 300 MG capsule Take 1 capsule (300 mg) by mouth daily      Glucosamine-Chondroit-MSM-C-Mn (FLEXI JOINT) TABS Take by mouth.      ibuprofen (ADVIL/MOTRIN) 200 MG capsule Take 200 mg by mouth every 6 hours as needed.      ipratropium (ATROVENT) 0.03 % nasal spray Spray 2 sprays in nostril      Multiple Vitamin (MULTIVITAMIN ADULT PO) Take by mouth.      nitroGLYcerin (NITROSTAT) 0.4 MG sublingual tablet Place 1 tablet (0.4 mg) under the tongue every 5 minutes as needed 20 tablet 0    oxyBUTYnin ER (DITROPAN XL) 10 MG 24 hr tablet Take 10 mg by mouth daily.      potassium chloride ER (K-TAB) 20 MEQ CR tablet Take 1 tablet (20 mEq) by mouth daily 90 tablet 3    rosuvastatin (CRESTOR) 40 MG tablet Take 1 tablet (40 mg) by mouth daily. 90 tablet 2    tamsulosin (FLOMAX) 0.4 MG capsule TAKE 1 CAPSULE BY MOUTH ONCE DAILY AFTER A MEAL      traZODone (DESYREL) 50 MG tablet Take 1 tablet (50 mg) by mouth At Bedtime 30 tablet 0    Turmeric (QC TUMERIC COMPLEX PO) Take by mouth.      TURMERIC PO Take 1,400 mg by mouth 2 times daily      UNABLE TO FIND Vitamin D3-Zinc-B12      UNABLE TO FIND Take 1 tablet by mouth at bedtime Relaxium Sleep      Zinc Sulfate (ZINC 15 PO) Take 50 mg by mouth daily Dose unknown      montelukast (SINGULAIR) 10 MG tablet Take 10 mg by mouth At Bedtime (Patient not taking: Reported on 1/14/2025)      UNABLE TO FIND Take 1 tablet by mouth daily Neuriva (Patient not taking: Reported on 1/14/2025)      UNABLE TO FIND Take 1 tablet by mouth 2 times daily Super Beta Prostate (Patient not taking: Reported on 1/14/2025)         ALLERGIES     Allergies   Allergen  "Reactions    Amoxicillin      severe rash    Contrast Dye Hives     Patient states this was years ago and he believes he has had dye since that time without problems.    Imdur [Isosorbide Nitrate] Other (See Comments)     headache    Lopressor [Metoprolol] Fatigue       PAST MEDICAL HISTORY:  Past Medical History:   Diagnosis Date    CAD (coronary artery disease)     cardiac cath 2015: non-obstructive diffuse CAD     Chest pain     chronic stable    Chronic stable angina     Contact dermatitis and other eczema, due to unspecified cause     DEPRESSIVE DISORDER NEC     Dyslipidemia     Fam hx-cardiovas dis NEC     Mother and Father    Hernia, abdominal     HTN (hypertension)     IRRITABLE COLON     Mild aortic stenosis     Mumps     Personal history of urinary calculi     Polyneuropathy     Skin cancer, basal cell 2008    SOB (shortness of breath)     Varicose veins        PAST SURGICAL HISTORY:  Past Surgical History:   Procedure Laterality Date    ANGIOGRAM  2015    Med Tx, no flow limiting lesions    COLONOSCOPY Left 2018    Procedure: COLONOSCOPY;  colonoscopy (fv)  ;  Surgeon: Nilesh Cervantes MD;  Location:  GI    CYSTOSCOPY      EYE EXAM ESTABLISHED PT      HC EXCISE VARICOCELE      \"cautery\" through intra venous approach    HC KNEE SCOPE, DIAGNOSTIC      Arthroscopy, Knee/left knee     HC REPAIR ING HERNIA,5+Y/O,REDUCIBL      Inguinal Hernia Repair  Right    TEST NOT FOUND      Per cut removal of L kidney stone    TESTICLE SURGERY  age 35    Varicocoele repair    VASECTOMY  age 30    ZZC URETER ENDOSCOPY THRU URETEROSTOMY REMV FB OR CALCULUS      dr [facility]         FAMILY HISTORY:  Family History   Problem Relation Age of Onset    Heart Disease Mother         91    Cancer - colorectal Mother     Heart Disease Father          70s    Diabetes Maternal Aunt     Alzheimer Disease No family hx of     Cancer No family hx of     " Prostate Cancer No family hx of        SOCIAL HISTORY:  Social History     Socioeconomic History    Marital status:      Spouse name: Velia    Number of children: 2    Years of education: 18   Occupational History    Occupation: Consultant     Employer: SELF   Tobacco Use    Smoking status: Former     Types: Cigars     Quit date: 1975     Years since quittin.0    Smokeless tobacco: Never    Tobacco comments:     2-3 cigars   Substance and Sexual Activity    Alcohol use: Yes     Alcohol/week: 3.3 standard drinks of alcohol     Types: 4 Glasses of wine per week     Comment: 1-2 glass of wine daily    Drug use: No    Sexual activity: Yes     Partners: Female     Birth control/protection: Surgical     Comment: vas   Other Topics Concern     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, , [employer]     Blood Transfusions No    Caffeine Concern Yes     Comment: 2 cups per day    Occupational Exposure No    Hobby Hazards No    Sleep Concern No    Stress Concern No    Weight Concern No    Special Diet No    Exercise No    Seat Belt Yes    Self-Exams No     Social Drivers of Health     Financial Resource Strain: Low Risk  (2024)    [facility]    Financial Resource Strain     Difficulty of Paying Living Expenses: 3   Food Insecurity: No Food Insecurity (2024)    [facility]    Food Insecurity     Do you worry your food will run out before you are able to buy more?: 1   Transportation Needs: No Transportation Needs (2024)    [facility]    Transportation Needs     Does lack of transportation keep you from medical appointments?: 1     Does lack of transportation keep you from work, meetings or getting things that you need?: 1   Social Connections: Socially Integrated (2024)    [facility]    Social  "Connections     Do you often feel lonely or isolated from those around you?: 0   Interpersonal Safety: Low Risk  (8/29/2024)    Interpersonal Safety     Do you feel physically and emotionally safe where you currently live?: Yes     Within the past 12 months, have you been hit, slapped, kicked or otherwise physically hurt by someone?: No     Within the past 12 months, have you been humiliated or emotionally abused in other ways by your partner or ex-partner?: No   Housing Stability: Low Risk  (6/17/2024)    Received from [employer] [facility]    Housing Stability     What is your housing situation today?: 1       Review of Systems:  Skin:          Eyes:         ENT:         Respiratory:  Negative       Cardiovascular:  Negative, palpitations, chest pain, syncope or near-syncope, cyanosis lightheadedness, Positive for, fatigue, edema    Gastroenterology:        Genitourinary:         Musculoskeletal:  Positive for joint pain has PT for knee  Neurologic:         Psychiatric:         Heme/Lymph/Imm:  Positive for   follows with oncologist  Endocrine:           Physical Exam:  Vitals: /75   Pulse 82   Ht 1.803 m (5' 11\")   Wt 86.8 kg (191 lb 6.4 oz)   BMI 26.69 kg/m      General Patient appears comfortable  Neck normal JVP  Cardiovascular system ejection systolic click heard over the right upper sternal border, no S3-S4 rub or gallop  Respiratory system clear to auscultation  Extremities minimal pitting pedal edema bilaterally        [place]UNDIO Carr [place]  [address] [address] [place] [address]                    "

## 2025-01-14 NOTE — LETTER
1/14/2025    Fuentes Carr,   [address]  [facility] [place] [address]    RE: Nilesh Hulle       Dear Colleague,     I had the pleasure of seeing Nilesh Dos Santos in the [facility].  HPI and Plan:   Mr. Dos Santos is a very pleasant 79-year-old gentleman with history of chronic stable angina in the past, with coronary CT angiogram in 2013 showing diffuse coronary artery calcification and then subsequent coronary angiogram in 2015 when he was found to have moderate stenosis involving the RPDA and posterolateral branch with FFR of 0.9 and 0.94, respectively, indicating they were not flow-limiting lesions.  He also has history of hypertension, chronic kidney disease stage III.  Exercise stress echocardiogram in 2021 was negative for ischemia.  He has also been diagnosed with CLL.  Last year he struggled with some lower extremity edema which responded to Lasix and compression stockings through lymphedema clinic.  He also had an echocardiogram done in July at an outside facility that showed normal LV and RV systolic function and aortic valve sclerosis.  Today patient is coming for routine follow-up.  He is accompanied by his wife.  No chest discomfort with exertion, no shortness of breath.  He has noted some dizziness which happens almost daily this happens when he is standing.  Some of the symptoms suggest like a balance issue.  He has not passed out.  No palpitations.  LDL is well-controlled at 50.  He is on aspirin, Crestor 20 mg daily, Lasix 40 mg daily, potassium supplementation.  He will be seeing his oncologist soon.    Assessment and plan  CAD.  History of chronic stable angina.  Now no exertional symptoms.  Doing well on aspirin and high intensity statin  LDL well-controlled on high intensity statin  Hypertension controlled on Lasix  History of lower extremity edema which has responded to Lasix and compression stockings.  Clinically does not appear to be congestive heart  failure  Dizziness does not sound arrhythmogenic in nature.  Will check EKG today as well as 48-hour Holter evaluation.  CLL    Recommendations  Continue aspirin, statin  EKG today, 48-hour Holter evaluation  If no significant abnormalities noted on 48-hour Holter evaluation would recommend evaluation with PCP for dizziness which sounds more like a balance issue  Follow-up in a year with an echocardiogram, sooner if he notes any change in clinical status  Orders Placed This Encounter   Procedures     Follow-Up with Cardiology     EKG 12-lead complete w/read - Clinics     Holter Monitor 48 hour Adult Pediatric     Echocardiogram Complete       Orders Placed This Encounter   Medications     celecoxib (CELEBREX) 200 MG capsule     Sig: Take 200 mg by mouth 2 times daily.     Multiple Vitamin (MULTIVITAMIN ADULT PO)     Sig: Take by mouth.     UNABLE TO FIND     Sig: Vitamin D3-Zinc-B12     Turmeric (QC TUMERIC COMPLEX PO)     Sig: Take by mouth.     docusate sodium (STOOL SOFTENER) 100 MG tablet     Sig: Take 100 mg by mouth daily.     FLUTICASONE PROPIONATE, INHAL, IN     Sig: Inhale into the lungs daily as needed.     Glucosamine-Chondroit-MSM-C-Mn (FLEXI JOINT) TABS     Sig: Take by mouth.       Medications Discontinued During This Encounter   Medication Reason     hydrochlorothiazide (HYDRODIURIL) 25 MG tablet Stopped by Patient (No AVS)     potassium chloride maricel ER (KLOR-CON M10) 10 MEQ CR tablet Duplicate Therapy (No AVS / No eCancel)         Encounter Diagnoses   Name Primary?     Dizziness Yes     Coronary artery disease involving native coronary artery of native heart without angina pectoris        CURRENT MEDICATIONS:  Current Outpatient Medications   Medication Sig Dispense Refill     ACETAMINOPHEN PO Take 600 mg by mouth 2 times daily       aspirin 81 MG tablet Take by mouth daily       celecoxib (CELEBREX) 200 MG capsule Take 200 mg by mouth 2 times daily.       Cholecalciferol (VITAMIN D3) 25 MCG (1000  UT) CAPS        co-enzyme Q-10 100 MG CAPS capsule Take 100 mg by mouth daily       cyanocobalamin (VITAMIN B-12) 500 MCG tablet Take 500 mcg by mouth daily       diphenhydrAMINE (BENADRYL) 25 MG tablet Take 25 mg by mouth at bedtime 14 tablet      docusate sodium (STOOL SOFTENER) 100 MG tablet Take 100 mg by mouth daily.       fluticasone (FLONASE) 50 MCG/ACT spray Spray 1-2 sprays into both nostrils daily 3 Bottle 3     FLUTICASONE PROPIONATE, INHAL, IN Inhale into the lungs daily as needed.       furosemide (LASIX) 20 MG tablet Take 40 mg by mouth every morning.       gabapentin (NEURONTIN) 300 MG capsule Take 1 capsule (300 mg) by mouth daily       Glucosamine-Chondroit-MSM-C-Mn (FLEXI JOINT) TABS Take by mouth.       ibuprofen (ADVIL/MOTRIN) 200 MG capsule Take 200 mg by mouth every 6 hours as needed.       ipratropium (ATROVENT) 0.03 % nasal spray Spray 2 sprays in nostril       Multiple Vitamin (MULTIVITAMIN ADULT PO) Take by mouth.       nitroGLYcerin (NITROSTAT) 0.4 MG sublingual tablet Place 1 tablet (0.4 mg) under the tongue every 5 minutes as needed 20 tablet 0     oxyBUTYnin ER (DITROPAN XL) 10 MG 24 hr tablet Take 10 mg by mouth daily.       potassium chloride ER (K-TAB) 20 MEQ CR tablet Take 1 tablet (20 mEq) by mouth daily 90 tablet 3     rosuvastatin (CRESTOR) 40 MG tablet Take 1 tablet (40 mg) by mouth daily. 90 tablet 2     tamsulosin (FLOMAX) 0.4 MG capsule TAKE 1 CAPSULE BY MOUTH ONCE DAILY AFTER A MEAL       traZODone (DESYREL) 50 MG tablet Take 1 tablet (50 mg) by mouth At Bedtime 30 tablet 0     Turmeric (QC TUMERIC COMPLEX PO) Take by mouth.       TURMERIC PO Take 1,400 mg by mouth 2 times daily       UNABLE TO FIND Vitamin D3-Zinc-B12       UNABLE TO FIND Take 1 tablet by mouth at bedtime Relaxium Sleep       Zinc Sulfate (ZINC 15 PO) Take 50 mg by mouth daily Dose unknown       montelukast (SINGULAIR) 10 MG tablet Take 10 mg by mouth At Bedtime (Patient not taking: Reported on 1/14/2025)    "    UNABLE TO FIND Take 1 tablet by mouth daily Neuriva (Patient not taking: Reported on 1/14/2025)       UNABLE TO FIND Take 1 tablet by mouth 2 times daily Super Beta Prostate (Patient not taking: Reported on 1/14/2025)         ALLERGIES     Allergies   Allergen Reactions     Amoxicillin      severe rash     Contrast Dye Hives     Patient states this was years ago and he believes he has had dye since that time without problems.     Imdur [Isosorbide Nitrate] Other (See Comments)     headache     Lopressor [Metoprolol] Fatigue       PAST MEDICAL HISTORY:  Past Medical History:   Diagnosis Date     CAD (coronary artery disease)     cardiac cath 2015: non-obstructive diffuse CAD      Chest pain     chronic stable     Chronic stable angina      Contact dermatitis and other eczema, due to unspecified cause      DEPRESSIVE DISORDER NEC      Dyslipidemia      Fam hx-cardiovas dis NEC     Mother and Father     Hernia, abdominal      HTN (hypertension)      IRRITABLE COLON      Mild aortic stenosis      Mumps      Personal history of urinary calculi      Polyneuropathy      Skin cancer, basal cell 2008     SOB (shortness of breath)      Varicose veins        PAST SURGICAL HISTORY:  Past Surgical History:   Procedure Laterality Date     ANGIOGRAM  11/17/2015    Med Tx, no flow limiting lesions     COLONOSCOPY Left 5/29/2018    Procedure: COLONOSCOPY;  colonoscopy (fv)  ;  Surgeon: Nilesh Cervantes MD;  Location:  GI     CYSTOSCOPY       EYE EXAM ESTABLISHED PT  2009     HC EXCISE VARICOCELE      \"cautery\" through intra venous approach     HC KNEE SCOPE, DIAGNOSTIC  2004    Arthroscopy, Knee/left knee      HC REPAIR ING HERNIA,5+Y/O,REDUCIBL  1990's    Inguinal Hernia Repair  Right     TEST NOT FOUND  2002    Per cut removal of L kidney stone     TESTICLE SURGERY  age 35    Varicocoele repair     VASECTOMY  age 30     ZZC URETER ENDOSCOPY THRU URETEROSTOMY REMV FB OR CALCULUS  2001    dr de la garza     [facility] COLONOSCOPY THRU " ELINA, DIAGNOSTIC         FAMILY HISTORY:  Family History   Problem Relation Age of Onset     Heart Disease Mother         91     Cancer - colorectal Mother      Heart Disease Father          70s     Diabetes Maternal Aunt      Alzheimer Disease No family hx of      Cancer No family hx of      Prostate Cancer No family hx of        SOCIAL HISTORY:  Social History     Socioeconomic History     Marital status:      Spouse name: Velia     Number of children: 2     Years of education: 18   Occupational History     Occupation: Consultant     Employer: SELF   Tobacco Use     Smoking status: Former     Types: Cigars     Quit date: 1975     Years since quittin.0     Smokeless tobacco: Never     Tobacco comments:     2-3 cigars   Substance and Sexual Activity     Alcohol use: Yes     Alcohol/week: 3.3 standard drinks of alcohol     Types: 4 Glasses of wine per week     Comment: 1-2 glass of wine daily     Drug use: No     Sexual activity: Yes     Partners: Female     Birth control/protection: Surgical     Comment: vas   Other Topics Concern      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, , [employer]      Blood Transfusions No     Caffeine Concern Yes     Comment: 2 cups per day     Occupational Exposure No     Hobby Hazards No     Sleep Concern No     Stress Concern No     Weight Concern No     Special Diet No     Exercise No     Seat Belt Yes     Self-Exams No     Social Drivers of Health     Financial Resource Strain: Low Risk  (2024)    Received from [facility]    Financial Resource Strain      Difficulty of Paying Living Expenses: 3   Food Insecurity: No Food Insecurity (2024)    Received from [facility]    Food Insecurity      Do you worry your food will run out before you are able to buy more?: 1   Transportation Needs: No Transportation Needs (2024)    Received from [employer]    Transportation Needs      Does lack of transportation keep you from medical appointments?: 1      Does lack of transportation keep you from work, meetings or getting things that you need?: 1   Social Connections: Socially Integrated (6/17/2024)    Received from [facility] [facility]    Social Connections      Do you often feel lonely or isolated from those around you?: 0   Interpersonal Safety: Low Risk  (8/29/2024)    Interpersonal Safety      Do you feel physically and emotionally safe where you currently live?: Yes      Within the past 12 months, have you been hit, slapped, kicked or otherwise physically hurt by someone?: No      Within the past 12 months, have you been humiliated or emotionally abused in other ways by your partner or ex-partner?: No   Housing Stability: Low Risk  (6/17/2024)    Received from [facility] [employer] [facility]    Housing Stability      What is your housing situation today?: 1       Review of Systems:  Skin:          Eyes:         ENT:         Respiratory:  Negative       Cardiovascular:  Negative, palpitations, chest pain, syncope or near-syncope, cyanosis lightheadedness, Positive for, fatigue, edema    Gastroenterology:        Genitourinary:         Musculoskeletal:  Positive for joint pain has PT for knee  Neurologic:         Psychiatric:         Heme/Lymph/Imm:  Positive for   follows with oncologist  Endocrine:           Physical Exam:  Vitals: /75   Pulse 82   Ht 1.803 m (5' 11\")   Wt 86.8 kg (191 lb 6.4 oz)   BMI 26.69 kg/m      General Patient appears comfortable  Neck normal JVP  Cardiovascular system ejection systolic click heard over the right upper sternal border, no S3-S4 rub or gallop  Respiratory system clear to auscultation  Extremities minimal pitting pedal [place]  [address] [place] [address]                      Thank you for allowing me to " participate in the care of your patient.      Sincerely,     Bharat Mata MD     [facility]  cc:   [place]  [address] [place] [address]

## 2025-03-18 ENCOUNTER — APPOINTMENT (OUTPATIENT)
Age: 80
Setting detail: DERMATOLOGY
End: 2025-03-18

## 2025-03-18 DIAGNOSIS — Z85.828 PERSONAL HISTORY OF OTHER MALIGNANT NEOPLASM OF SKIN: ICD-10-CM

## 2025-03-18 DIAGNOSIS — D22 MELANOCYTIC NEVI: ICD-10-CM

## 2025-03-18 DIAGNOSIS — L57.0 ACTINIC KERATOSIS: ICD-10-CM

## 2025-03-18 DIAGNOSIS — L82.1 OTHER SEBORRHEIC KERATOSIS: ICD-10-CM

## 2025-03-18 DIAGNOSIS — L85.3 XEROSIS CUTIS: ICD-10-CM

## 2025-03-18 DIAGNOSIS — Z71.89 OTHER SPECIFIED COUNSELING: ICD-10-CM

## 2025-03-18 DIAGNOSIS — D18.0 HEMANGIOMA: ICD-10-CM

## 2025-03-18 PROBLEM — D18.01 HEMANGIOMA OF SKIN AND SUBCUTANEOUS TISSUE: Status: ACTIVE | Noted: 2025-03-18

## 2025-03-18 PROBLEM — C44.219 BASAL CELL CARCINOMA OF SKIN OF LEFT EAR AND EXTERNAL AURICULAR CANAL: Status: ACTIVE | Noted: 2025-03-18

## 2025-03-18 PROBLEM — D22.5 MELANOCYTIC NEVI OF TRUNK: Status: ACTIVE | Noted: 2025-03-18

## 2025-03-18 PROCEDURE — ? PRESCRIPTION

## 2025-03-18 PROCEDURE — ? OBSERVATION

## 2025-03-18 PROCEDURE — ? PRESCRIPTION MEDICATION MANAGEMENT

## 2025-03-18 PROCEDURE — 99214 OFFICE O/P EST MOD 30 MIN: CPT

## 2025-03-18 PROCEDURE — ? DIAGNOSIS COMMENT

## 2025-03-18 PROCEDURE — ? SUNSCREEN RECOMMENDATIONS

## 2025-03-18 PROCEDURE — ? COUNSELING

## 2025-03-18 RX ORDER — BETAMETHASONE DIPROPIONATE 0.5 MG/G
CREAM TOPICAL
Qty: 45 | Refills: 3 | Status: ERX | COMMUNITY
Start: 2025-03-18

## 2025-03-18 RX ORDER — IMIQUIMOD 12.5 MG/.25G
CREAM TOPICAL
Qty: 24 | Refills: 1 | Status: ERX | COMMUNITY
Start: 2025-03-18

## 2025-03-18 RX ADMIN — BETAMETHASONE DIPROPIONATE: 0.5 CREAM TOPICAL at 00:00

## 2025-03-18 RX ADMIN — IMIQUIMOD: 12.5 CREAM TOPICAL at 00:00

## 2025-03-18 ASSESSMENT — LOCATION DETAILED DESCRIPTION DERM
LOCATION DETAILED: MIDDLE STERNUM
LOCATION DETAILED: LEFT INFERIOR MEDIAL FOREHEAD
LOCATION DETAILED: MID-FRONTAL SCALP
LOCATION DETAILED: SUPERIOR THORACIC SPINE
LOCATION DETAILED: RIGHT SUPERIOR MEDIAL MIDBACK
LOCATION DETAILED: INFERIOR THORACIC SPINE
LOCATION DETAILED: RIGHT DISTAL PRETIBIAL REGION
LOCATION DETAILED: EPIGASTRIC SKIN
LOCATION DETAILED: LEFT LATERAL KNEE
LOCATION DETAILED: RIGHT DISTAL DORSAL FOREARM
LOCATION DETAILED: SUPERIOR MID FOREHEAD

## 2025-03-18 ASSESSMENT — LOCATION ZONE DERM
LOCATION ZONE: TRUNK
LOCATION ZONE: FACE
LOCATION ZONE: ARM
LOCATION ZONE: SCALP
LOCATION ZONE: LEG

## 2025-03-18 ASSESSMENT — LOCATION SIMPLE DESCRIPTION DERM
LOCATION SIMPLE: RIGHT LOWER BACK
LOCATION SIMPLE: LEFT KNEE
LOCATION SIMPLE: CHEST
LOCATION SIMPLE: ABDOMEN
LOCATION SIMPLE: RIGHT FOREARM
LOCATION SIMPLE: SUPERIOR FOREHEAD
LOCATION SIMPLE: ANTERIOR SCALP
LOCATION SIMPLE: RIGHT PRETIBIAL REGION
LOCATION SIMPLE: LEFT FOREHEAD
LOCATION SIMPLE: UPPER BACK

## 2025-03-18 NOTE — PROCEDURE: PRESCRIPTION MEDICATION MANAGEMENT
Initiate Treatment: Betamethasone dipropionate cream twice daily for 1-2 weeks then as needed
Render In Strict Bullet Format?: No
Detail Level: Zone
Initiate Treatment: [place] with imiquimod cream at bedtime Monday thru Friday for 4 weeks

## 2025-03-18 NOTE — HPI: SKIN LESION (ACTINIC KERATOSES)
Is This A New Presentation, Or A Follow-Up?: Follow Up Actinic Keratoses
Additional History: No actinic keratoses treated at the last visit.

## 2025-03-18 NOTE — HPI: NON-MELANOMA SKIN CANCER F/U (HISTORY OF NMSC)
How Many Skin Cancers Have You Had?: more than one
What Is The Reason For Today's Visit?: History of Non-Melanoma Skin Cancer
How Many Skin Cancers Have You Had?: one
What Is The Reason For Today's Visit?: Follow Up Non-Melanoma Skin Cancer
Additional History: The patient was on previous treatment of imiquimod cream hs Monday thru Friday for 6 weeks. The patient treated with the cream for about 2-3 weeks and states it did not get red or irritated. He finished treatment several months ago and states he misplaced the cream.

## 2025-03-18 NOTE — PROCEDURE: OBSERVATION
Body Location Override (Optional - Billing Will Still Be Based On Selected Body Map Location If Applicable): left parietal scalp and the right forehead
Detail Level: Detailed
Size Of Lesion In Cm (Optional): 0
Body Location Override (Optional - Billing Will Still Be Based On Selected Body Map Location If Applicable): left posterior helical rim at 2:00

## 2025-07-26 ENCOUNTER — HEALTH MAINTENANCE LETTER (OUTPATIENT)
Age: 80
End: 2025-07-26

## 2025-08-14 DIAGNOSIS — E78.2 MIXED HYPERLIPIDEMIA: ICD-10-CM

## 2025-08-14 RX ORDER — ROSUVASTATIN CALCIUM 40 MG/1
40 TABLET, COATED ORAL DAILY
Qty: 90 TABLET | Refills: 1 | Status: SHIPPED | OUTPATIENT
Start: 2025-08-14

## 2025-09-03 ENCOUNTER — HOSPITAL ENCOUNTER (EMERGENCY)
Facility: CLINIC | Age: 80
Discharge: HOME | End: 2025-09-03
Attending: EMERGENCY MEDICINE | Admitting: EMERGENCY MEDICINE
Payer: COMMERCIAL

## 2025-09-03 VITALS
WEIGHT: 193.34 LBS | TEMPERATURE: 97.3 F | OXYGEN SATURATION: 99 % | HEIGHT: 71 IN | SYSTOLIC BLOOD PRESSURE: 144 MMHG | RESPIRATION RATE: 18 BRPM | DIASTOLIC BLOOD PRESSURE: 75 MMHG | BODY MASS INDEX: 27.07 KG/M2 | HEART RATE: 68 BPM

## 2025-09-03 DIAGNOSIS — R07.9 ACUTE CHEST PAIN: Primary | ICD-10-CM

## 2025-09-03 LAB
ALBUMIN UR-MCNC: NEGATIVE MG/DL
ANION GAP SERPL CALCULATED.3IONS-SCNC: 12 MMOL/L (ref 7–15)
APPEARANCE UR: CLEAR
BILIRUB UR QL STRIP: NEGATIVE
BUN SERPL-MCNC: 10.6 MG/DL (ref 8–23)
CALCIUM SERPL-MCNC: 9.1 MG/DL (ref 8.8–10.4)
CHLORIDE SERPL-SCNC: 102 MMOL/L (ref 98–107)
COLOR UR AUTO: ABNORMAL
CREAT SERPL-MCNC: 1.45 MG/DL (ref 0.67–1.17)
EGFRCR SERPLBLD CKD-EPI 2021: 49 ML/MIN/1.73M2
GLUCOSE SERPL-MCNC: 126 MG/DL (ref 70–99)
GLUCOSE UR STRIP-MCNC: NEGATIVE MG/DL
HCO3 SERPL-SCNC: 27 MMOL/L (ref 22–29)
HGB UR QL STRIP: NEGATIVE
HOLD SPECIMEN: NORMAL
HOLD SPECIMEN: NORMAL
KETONES UR STRIP-MCNC: NEGATIVE MG/DL
LEUKOCYTE ESTERASE UR QL STRIP: NEGATIVE
MUCOUS THREADS #/AREA URNS LPF: PRESENT /LPF
NITRATE UR QL: NEGATIVE
PH UR STRIP: 7 [PH] (ref 5–7)
PLAT MORPH BLD: NORMAL
POTASSIUM SERPL-SCNC: 4 MMOL/L (ref 3.4–5.3)
RBC MORPH BLD: NORMAL
RBC URINE: <1 /HPF
SODIUM SERPL-SCNC: 141 MMOL/L (ref 135–145)
SP GR UR STRIP: 1 (ref 1–1.03)
TROPONIN T SERPL HS-MCNC: 24 NG/L
TROPONIN T SERPL HS-MCNC: 24 NG/L
UROBILINOGEN UR STRIP-MCNC: NORMAL MG/DL
WBC URINE: 1 /HPF

## 2025-09-03 PROCEDURE — 81003 URINALYSIS AUTO W/O SCOPE: CPT | Performed by: EMERGENCY MEDICINE

## 2025-09-03 PROCEDURE — 36415 COLL VENOUS BLD VENIPUNCTURE: CPT | Performed by: EMERGENCY MEDICINE

## 2025-09-03 PROCEDURE — 250N000013 HC RX MED GY IP 250 OP 250 PS 637: Performed by: EMERGENCY MEDICINE

## 2025-09-03 PROCEDURE — 82310 ASSAY OF CALCIUM: CPT | Performed by: EMERGENCY MEDICINE

## 2025-09-03 PROCEDURE — 84484 ASSAY OF TROPONIN QUANT: CPT | Performed by: EMERGENCY MEDICINE

## 2025-09-03 PROCEDURE — 99285 EMERGENCY DEPT VISIT HI MDM: CPT | Mod: 25 | Performed by: EMERGENCY MEDICINE

## 2025-09-03 PROCEDURE — 93005 ELECTROCARDIOGRAM TRACING: CPT

## 2025-09-03 RX ORDER — ACETAMINOPHEN 325 MG/1
650 TABLET ORAL ONCE
Status: COMPLETED | OUTPATIENT
Start: 2025-09-03 | End: 2025-09-03

## 2025-09-03 RX ADMIN — ACETAMINOPHEN 650 MG: 325 TABLET ORAL at 18:51

## 2025-09-03 ASSESSMENT — ACTIVITIES OF DAILY LIVING (ADL)
ADLS_ACUITY_SCORE: 41

## 2025-09-03 ASSESSMENT — COLUMBIA-SUICIDE SEVERITY RATING SCALE - C-SSRS
1. IN THE PAST MONTH, HAVE YOU WISHED YOU WERE DEAD OR WISHED YOU COULD GO TO SLEEP AND NOT WAKE UP?: NO
6. HAVE YOU EVER DONE ANYTHING, STARTED TO DO ANYTHING, OR PREPARED TO DO ANYTHING TO END YOUR LIFE?: NO
2. HAVE YOU ACTUALLY HAD ANY THOUGHTS OF KILLING YOURSELF IN THE PAST MONTH?: NO

## 2025-09-04 LAB
ATRIAL RATE - MUSE: 71 BPM
DIASTOLIC BLOOD PRESSURE - MUSE: NORMAL MMHG
ERYTHROCYTE [DISTWIDTH] IN BLOOD BY AUTOMATED COUNT: 14.1 % (ref 10–15)
HCT VFR BLD AUTO: 37.3 % (ref 40–53)
HGB BLD-MCNC: 12.9 G/DL (ref 13.3–17.7)
INTERPRETATION ECG - MUSE: NORMAL
MCH RBC QN AUTO: 33.6 PG (ref 26.5–33)
MCHC RBC AUTO-ENTMCNC: 34.6 G/DL (ref 31.5–36.5)
MCV RBC AUTO: 97.1 FL (ref 78–100)
P AXIS - MUSE: 31 DEGREES
PLATELET # BLD AUTO: 152 10E3/UL (ref 150–450)
PR INTERVAL - MUSE: 152 MS
QRS DURATION - MUSE: 88 MS
QT - MUSE: 384 MS
QTC - MUSE: 417 MS
R AXIS - MUSE: 14 DEGREES
RBC # BLD AUTO: 3.84 10E6/UL (ref 4.4–5.9)
SYSTOLIC BLOOD PRESSURE - MUSE: NORMAL MMHG
T AXIS - MUSE: 44 DEGREES
VENTRICULAR RATE- MUSE: 71 BPM
WBC # BLD AUTO: 15.29 10E3/UL (ref 4–11)

## (undated) DEVICE — KIT ENDO TURNOVER/PROCEDURE W/CLEAN A SCOPE LINERS 103888

## (undated) RX ORDER — FENTANYL CITRATE 50 UG/ML
INJECTION, SOLUTION INTRAMUSCULAR; INTRAVENOUS
Status: DISPENSED
Start: 2018-05-29